# Patient Record
Sex: FEMALE | Race: WHITE | NOT HISPANIC OR LATINO | Employment: FULL TIME | URBAN - METROPOLITAN AREA
[De-identification: names, ages, dates, MRNs, and addresses within clinical notes are randomized per-mention and may not be internally consistent; named-entity substitution may affect disease eponyms.]

---

## 2017-01-27 ENCOUNTER — TELEPHONE (OUTPATIENT)
Dept: NEUROLOGY | Facility: CLINIC | Age: 52
End: 2017-01-27

## 2017-01-27 NOTE — TELEPHONE ENCOUNTER
----- Message from Vicky Leung sent at 1/27/2017  1:26 PM CST -----  Contact: Georgina Perrin   Call for scheduling   Call back

## 2017-02-03 ENCOUNTER — OFFICE VISIT (OUTPATIENT)
Dept: NEUROLOGY | Facility: CLINIC | Age: 52
End: 2017-02-03
Payer: COMMERCIAL

## 2017-02-03 VITALS
DIASTOLIC BLOOD PRESSURE: 95 MMHG | TEMPERATURE: 97 F | HEART RATE: 94 BPM | BODY MASS INDEX: 33.38 KG/M2 | SYSTOLIC BLOOD PRESSURE: 135 MMHG | WEIGHT: 170 LBS | RESPIRATION RATE: 18 BRPM | HEIGHT: 60 IN

## 2017-02-03 DIAGNOSIS — G43.419 INTRACTABLE HEMIPLEGIC MIGRAINE WITHOUT STATUS MIGRAINOSUS: Primary | ICD-10-CM

## 2017-02-03 PROCEDURE — 99204 OFFICE O/P NEW MOD 45 MIN: CPT | Mod: S$GLB,,, | Performed by: PSYCHIATRY & NEUROLOGY

## 2017-02-03 PROCEDURE — 99999 PR PBB SHADOW E&M-EST. PATIENT-LVL III: CPT | Mod: PBBFAC,,, | Performed by: PSYCHIATRY & NEUROLOGY

## 2017-02-03 RX ORDER — POTASSIUM CHLORIDE 750 MG/1
20 CAPSULE, EXTENDED RELEASE ORAL DAILY
COMMUNITY
End: 2020-05-09 | Stop reason: CLARIF

## 2017-02-03 RX ORDER — LEVOTHYROXINE SODIUM 50 UG/1
100 TABLET ORAL
COMMUNITY
Start: 2017-01-18 | End: 2020-05-09 | Stop reason: CLARIF

## 2017-02-03 RX ORDER — METHYLPREDNISOLONE 4 MG/1
TABLET ORAL
COMMUNITY
Start: 2016-12-12 | End: 2017-02-03

## 2017-02-03 RX ORDER — LOSARTAN POTASSIUM 25 MG/1
TABLET ORAL
COMMUNITY
Start: 2016-12-29 | End: 2020-05-09 | Stop reason: CLARIF

## 2017-02-03 RX ORDER — HYDROCHLOROTHIAZIDE 12.5 MG/1
12.5 CAPSULE ORAL DAILY
COMMUNITY
Start: 2017-01-09 | End: 2021-02-22 | Stop reason: SDUPTHER

## 2017-02-03 NOTE — PROGRESS NOTES
"Subjective:       Patient ID: Nena Franco is a 52 y.o. female.    Chief Complaint: Headache    HPI    Ms. Franco is a 53 yo RHWF with pmh significant for 2006 benign pituitary adenoma, unchanged per repeat MRI 2010, s/p gabapentin therapy for 2 years. She also suffers with coronary vasospasm, SVT (resolved 2009) who is here with a diagnosis of left complex hemiplegic migraines diagnosed in 2012 by [facility] neurologist Dr. Martínez Peterson. Her headaches started in 1990s with typical migraine aurass. She was well without episodes from 8368-7220 then restarted in 2010. Prodrome consists of chills and left had loss of circulation "dusky" colored. Getting the prodrome less frequently now. Gets HAs 2-3 times/week + unsteady + dizziness + left sided hemiplegia + decreased concentration + blurry vision + dysarthria. Her pain is not a headache per se, it affects the left sided joints. It responds to a combination of flexeril and naproxen.     Trialed: Botox (gets eyebrow paralysis if below hairline) controls best; Has tried Topamax, Depakote, Tofranil, Atenolol, Metropolol, Nadolol, Propanolol (former did not work), Imitrex (triptan syndrome); Flexeril + Naproxen shortens duration of symptomatology; acupuncture did not help        Headache questionnaire     1. When did your Headaches start?   2010        2. Where are your headaches located?  Mostly back of neck to temple        3. Your headache's characteristics:  Pressure, Stabbing        4. How long does the headache last?  12 hours to 2 days        5. How often does the headache occur?  2-3 times a week        6. Are your headaches preceded or accompanied by other symptoms? yes  If yes, please describe. Left hand turns black; stroke like symptoms        7. Does the headache awaken you at night? no  If so, how often?            8. Please kenna the word that best describes your headache's intensity:   Moderate to severe        9. Using a scale of 1 through 10, " with 0 = no pain and 10 = the worst pain:  What score is your headache now? 3  What score is your headache at its worst? 10  What score is your headache at its best? 1      10. Possible associated headache symptoms:  []  Sensitivity to light  [x] Dizziness  [] Nasal or sinus pressure/ pain   [] Sensitivity to noise  [x] Vertigo  [x] Problems with concentration  [] Sensitivity to smells  [] Ringing in ears  [x] Problems with memory    [x] Blurred vision  [] Irritability  [x] Problems with task completion   [] Double vision  [] Anger  []  Problems with relaxation  [] Loss of appetite  [] Anxiety  [] Neck tightness, Neck pain  [] Nausea   [] Nasal congestion  [] Vomiting           11. Headache improving factors:  [] Sleep  [] Heat  [] Darkness  [] Ice  [] Local pressure [] Menses (period)  [] Massage   [x] Medications:         12. Headache worsening factors:   [] Fatigue [] Sneezing  [] Changes in Weather  [] Light [] Bending Over [] Stress  [] Noise [] Ovulation  [] Multiple Sclerosis Flare-Up  [] Smells  [] Menses  [] Food   [] Coughing [] Alcohol        13. Number of caffeinated drinks per day: 1        14. Number of diet drinks per day: 0     Review of Systems   Constitutional: Negative for activity change, appetite change, fatigue and fever.   HENT: Negative for congestion, dental problem, hearing loss, sinus pressure, tinnitus, trouble swallowing and voice change.    Eyes: Negative for photophobia, pain, redness and visual disturbance.   Respiratory: Negative for cough, chest tightness and shortness of breath.    Cardiovascular: Negative for chest pain, palpitations and leg swelling.   Gastrointestinal: Negative for abdominal pain, blood in stool, nausea and vomiting.   Endocrine: Negative for cold intolerance and heat intolerance.   Genitourinary: Negative for difficulty urinating, frequency, menstrual problem and urgency.   Musculoskeletal: Negative for arthralgias, back pain, gait problem, joint swelling,  myalgias, neck pain and neck stiffness.   Skin: Negative.    Neurological: Positive for headaches. Negative for dizziness, tremors, seizures, syncope, facial asymmetry, speech difficulty, weakness, light-headedness and numbness.   Hematological: Negative for adenopathy. Does not bruise/bleed easily.   Psychiatric/Behavioral: Negative for agitation, behavioral problems, confusion, decreased concentration, self-injury, sleep disturbance and suicidal ideas. The patient is not nervous/anxious and is not hyperactive.          Past Medical History   Diagnosis Date    Adenoma of pituitary      benign, history of, no longer visible on CT scan after treatment    GERD (gastroesophageal reflux disease)     Hiatal hernia     History of kidney stones     Migraines     Sustained SVT      history of, no problem at this time     Past Surgical History   Procedure Laterality Date    Hysterectomy      Left foot       orthopedic repair    Pr exploratory of abdomen       for ruptured ectopic    Appendectomy      Cholecystectomy       lap baldev with scar revision    Cardiac catheterization      Incision and drainage of wound       spider bite to chest wall    Ganglion cyst excision       right wrist    Esophagogastroduodenoscopy      Colonoscopy w/ biopsies and polypectomy       Family History   Problem Relation Age of Onset    Cancer Mother      colon ca    Cancer Father      colon ca     Social History     Social History    Marital status: Single     Spouse name: N/A    Number of children: N/A    Years of education: N/A     Occupational History    Not on file.     Social History Main Topics    Smoking status: Never Smoker    Smokeless tobacco: Never Used    Alcohol use Yes      Comment: rarely    Drug use: No    Sexual activity: Not on file     Other Topics Concern    Not on file     Social History Narrative     Review of patient's allergies indicates:   Allergen Reactions    Adhesive      Sensitive to  Steri-strips     Guaifenesin     Levaquin [levofloxacin]     Guaifenesin-sodium citrate Rash       Current Outpatient Prescriptions:     cyclobenzaprine (FLEXERIL) 10 MG tablet, Take 10 mg by mouth 3 (three) times daily as needed., Disp: , Rfl:     esomeprazole (NEXIUM) 40 MG capsule, Take 40 mg by mouth before breakfast., Disp: , Rfl:     hydrochlorothiazide (MICROZIDE) 12.5 mg capsule, , Disp: , Rfl:     losartan (COZAAR) 25 MG tablet, , Disp: , Rfl:     naproxen (EC NAPROSYN) 500 MG EC tablet, Take 500 mg by mouth daily as needed., Disp: , Rfl:     potassium chloride (MICRO-K) 10 MEQ CpSR, Take 20 mEq by mouth once daily., Disp: , Rfl:     SYNTHROID 50 mcg tablet, , Disp: , Rfl:       Objective:      Vitals:    02/03/17 0909   BP: (!) 135/95   Pulse: 94   Resp: 18   Temp: 97 °F (36.1 °C)     Body mass index is 33.2 kg/(m^2).    Physical Exam    Constitutional:   She appears well-developed and well-nourished. She is well groomed    HENT:    Head: Atraumatic, oral and nasal mucosa intact  Eyes: Conjunctivae and EOM are normal. Pupils are equal, round, and reactive to light OU  Neck: Neck supple. No thyromegaly present  Cardiovascular: Normal rate and normal heart sounds  No murmur heard  Pulmonary/Chest: Effort normal and breath sounds normal  Musculoskeletal: Normal range of motion. No joint stiffness. No vertebral point tenderness  Skin: Skin is warm and dry  Psychiatric: Normal mood and affect     Neuro exam:    Mental status:  Awake, attentive, Alert, oriented to self, place, year and month  Language function is intact    Cranial Nerves:  Smell was not formally evaluated  Cranial Nerves II - XII: intact  Pursuits were smooth, normal saccades, no nystagmus OU  Funduscopic exam - disc were flat and pink, no exudates or hemorrhages OU  Motor - facial movement was symmetrical and normal     Palate moved well and was symmetrical with normal palatal and oral sensation  Tongue movements were  full    Coordination:     Rapid alternating movements and rapid finger tapping - normal bilaterally  Finger to nose - normal and symmetric bilaterally   Heel to shin test - normal and symmetric bilaterally   Arm roll - smooth and symmetric   No intentional or positional tremor.     Motor:  Normal muscle bulk and symmetry. No fasciculations were noted    No pronator drift  Strength  5/5 bilaterally     Reflexes:  Tendon reflexes were 2 + at biceps, triceps, brachioradialis, patellar, and Achilles bilaterally  On plantar stimulation toes were down going bilaterally  No clonus was noted     Sensory: Intact to light touch, pin prick in all extremities, except minimal decrease in dorsum of left hand and right forehead (10% decrease)    Gait: Romberg absent. Normal gait. Normal arm swing and turns. Normal postural reflexes.     Review of Data: MRI of the brain, unremarkable        Assessment and Plan   Hemiplegic Migraine, has done best with Botox Treatment. Previously tried Topamax, Depakote, Tofranil, Atenolol, Metropolol, Nadolol, Propanolol (former did not work), Imitrex (triptan syndrome); Flexeril + Naproxen shortens duration of symptomatology; acupuncture did not help   For added prevention start Verapamil 120 ER daily and Magnesium Oxide 400 mg bid  For the treatment of the aura Diamox 250 mg and for the pain phase continue with combination of Naproxen and flexeril   I have discussed the side effects of the medications prescribed and the patient acknowledges understanding  RTC in 2 to 3 weeks for Botox  Eulalia Paniagua M.D  Medical Director, Headache and Facial Pain  [facility]

## 2017-02-03 NOTE — PATIENT INSTRUCTIONS
Botox Treatment  For added prevention start Verapamil 120 ER daily and Magnesium Oxide 400 mg bid  For the treatment of the aura Diamox 250 mg and for the pain phase continue with combination of Naproxen and flexeril

## 2017-02-03 NOTE — PROGRESS NOTES
Headache questionnaire    1. When did your Headaches start?    2010      2. Where are your headaches located?   Mostly back of neck to temple      3. Your headache's characteristics:   Pressure, Stabbing      4. How long does the headache last?   12 hours to 2 days      5. How often does the headache occur?   2-3 times a week      6. Are your headaches preceded or accompanied by other symptoms? yes   If yes, please describe.  Left hand turns black; stroke like symptoms      7. Does the headache awaken you at night? no   If so, how often?         8. Please kenna the word that best describes your headache's intensity:    Moderate to severe      9. Using a scale of 1 through 10, with 0 = no pain and 10 = the worst pain:   What score is your headache now? 3   What score is your headache at its worst? 10   What score is your headache at its best? 1        10. Possible associated headache symptoms:  []  Sensitivity to light  [x] Dizziness  [] Nasal or sinus pressure/ pain   [] Sensitivity to noise  [x] Vertigo  [x] Problems with concentration  [] Sensitivity to smells  [] Ringing in ears  [x] Problems with memory    [x] Blurred vision  [] Irritability  [x] Problems with task completion   [] Double vision  [] Anger  []  Problems with relaxation  [] Loss of appetite  [] Anxiety  [] Neck tightness, Neck pain  [] Nausea   [] Nasal congestion  [] Vomiting         11. Headache improving factors:  [] Sleep  [] Heat  [] Darkness  [] Ice  [] Local pressure [] Menses (period)  [] Massage   [x] Medications:        12. Headache worsening factors:   [] Fatigue [] Sneezing  [] Changes in Weather  [] Light [] Bending Over [] Stress  [] Noise [] Ovulation  [] Multiple Sclerosis Flare-Up  [] Smells  [] Menses  [] Food   [] Coughing [] Alcohol      13. Number of caffeinated drinks per day: 1      14. Number of diet drinks per day:  0      15. Have you seen any other [facility]ists within the last 3 years?  no

## 2017-02-03 NOTE — MR AVS SNAPSHOT
[facility]  [address] [facility] [address]  [place] [address]  Phone: [phone]  Fax: [phone]                  Nena Franco   2/3/2017 9:00 AM   Office Visit    Description:  Female : 1965   Provider:  Destinee Paniagua MD   Department:  [facility]           Reason for Visit     Headache           Diagnoses this Visit        Comments    Intractable hemiplegic migraine without status migrainosus    -  Primary            To Do List           Future Appointments        Provider Department Dept Phone    2/15/2017 8:30 AM Destinee Paniagua MD [facility] [phone]      Goals (5 Years of Data)     None      [facility]     [facility] On Call Nurse Care Line -  Assistance  Registered nurses in the [facility] On Call Center provide clinical advisement, health education, appointment booking, and other advisory services.  Call for this free service at [phone].             Medications           Message regarding Medications     Verify the changes and/or additions to your medication regime listed below are the same as discussed with your clinician today.  If any of these changes or additions are incorrect, please notify your healthcare provider.        STOP taking these medications     topiramate (TOPAMAX) 25 MG tablet Take 25 mg by mouth 2 (two) times daily.    methylPREDNISolone (MEDROL DOSEPACK) 4 mg tablet            Verify that the below list of medications is an accurate representation of the medications you are currently taking.  If none reported, the list may be blank. If incorrect, please contact your healthcare provider. Carry this list with you in case of emergency.           Current Medications     cyclobenzaprine (FLEXERIL) 10 MG tablet Take 10 mg by mouth 3 (three) times daily as needed.    esomeprazole (NEXIUM) 40 MG capsule Take 40 mg by mouth before breakfast.    hydrochlorothiazide (MICROZIDE) 12.5 mg capsule     losartan (COZAAR) 25 MG  tablet     naproxen (EC NAPROSYN) 500 MG EC tablet Take 500 mg by mouth daily as needed.    potassium chloride (MICRO-K) 10 MEQ CpSR Take 20 mEq by mouth once daily.    SYNTHROID 50 mcg tablet            Clinical Reference Information           Your Vitals Were     BP Pulse Temp Resp Height Weight    135/95 (BP Location: Left arm, Patient Position: Sitting, BP Method: Automatic) 94 97 °F (36.1 °C) (Oral) 18 5' (1.524 m) 77.1 kg (170 lb)    BMI                33.2 kg/m2          Blood Pressure          Most Recent Value    BP  (!)  135/95      Allergies as of 2/3/2017     Adhesive    Guaifenesin    Levaquin [Levofloxacin]    Guaifenesin-sodium Citrate      Immunizations Administered on Date of Encounter - 2/3/2017     None      Instructions    Botox Treatment  For added prevention start Verapamil 120 ER daily and Magnesium Oxide 400 mg bid  For the treatment of the aura Diamox 250 mg and for the pain phase continue with combination of Naproxen and flexeril        Language Assistance Services     ATTENTION: Language assistance services are available, free of charge. Please call [phone].      ATENCIÓN: Si dyllanla camille, tiene a arevalo disposición servicios gratuitos de asistencia lingüística. Llame al [phone].     TARAS Ý: N?u b?n nói Ti?ng Vi?t, có các d?ch v? h? tr? ngôn ng? mi?n phí dành cho b?n. G?i s? [phone].         [facility] complies with applicable Federal civil rights laws and does not discriminate on the basis of race, color, national origin, age, disability, or sex.

## 2017-02-03 NOTE — MEDICAL/APP STUDENT
"S: Ms. Franco is 51 yo RHWF with pmh significant for 2006 benign pituitary adenoma s/p gabapentin therapy for 2 years, coronary vasospasm, SVT (resolved 2009) here with a diagnosis of  left complex hemiplegic migraines diagnosed in 2012 by [place] Dr. Peterson. Headaches started in 1990s with classic migraines nothing in 3335-4250 then restarted in 2010. Prodrome is chills and left had loss of circulation "dusky" colored. Getting the prodrome less frequently now. Gets HAs 2-3 times/week + unsteady + dizziness + left sided hemiplegia + decreased concentration + blurry vision + dysarthria.    Triggers: Cold    Trialed: Botox (gets eyebrow paralysis if below hairline) controls best; Topamax, Depakote, Tofranil, Atenolol, Metropolol, Nadolol, Propanolol (former did not work), Imitrex (triptan syndrome); Flexeril + Naproxen shortens duration of symptomatology; acupuncture did not help     Social: Patient works as a nurse consultant for [employer], drinks alcohol 2-3 times/ year,  Non-smoker  FHx: Mother with migraines    Current Outpatient Prescriptions on File Prior to Visit   Medication Sig Dispense Refill    cyclobenzaprine (FLEXERIL) 10 MG tablet Take 10 mg by mouth 3 (three) times daily as needed.      esomeprazole (NEXIUM) 40 MG capsule Take 40 mg by mouth before breakfast.      naproxen (EC NAPROSYN) 500 MG EC tablet Take 500 mg by mouth daily as needed.      [DISCONTINUED] topiramate (TOPAMAX) 25 MG tablet Take 25 mg by mouth 2 (two) times daily.       No current facility-administered medications on file prior to visit.      O:  Visit Vitals    BP (!) 135/95 (BP Location: Left arm, Patient Position: Sitting, BP Method: Automatic)    Pulse 94    Temp 97 °F (36.1 °C) (Oral)    Resp 18    Ht 5' (1.524 m)    Wt 77.1 kg (170 lb)    BMI 33.2 kg/m2     MSE: AOx3  CN: R trapezius increased bulk  Tone: normal  Motor: 5/5  DTRs: 1+  SN: R face and L arm decreased to sharp touch 90-95/100  Coordination: normal " finger to nose  Gait: normal  Romberg: negative    A: 53 yo RHWF here with diagnosed left sided complex hemiplegic migraines  P:  1. Magnesium and Riboflavin   2. botox approval

## 2017-02-03 NOTE — LETTER
February 3, 2017      Martínez Peterson MD  [address]  [address]  [facility] [address]           [facility]  [address] [facility] [address] [place] [address]  Phone: [phone]  Fax: [phone]          Patient: Nena Franco   MR Number: [id number]   YOB: 1965   Date of Visit: 2/3/2017       Dear Dr. Martínez Peterson:    Thank you for referring Nena Franco to me for evaluation. Attached you will find relevant portions of my assessment and plan of care.    If you have questions, please do not hesitate to call me. I look forward to following Nena Franco along with you.    Sincerely,    Destinee Paniagua MD    Enclosure  CC:  No Recipients    If you would like to receive this communication electronically, please contact [email or web address] or [phone] to request more information on [employer] access.    For providers and/or their staff who would like to refer a patient to [facility], please contact us through our one-stop-shop provider referral line, [facility], at [phone].    If you feel you have received this communication in error or would no longer like to receive these types of communications, please e-mail [email or web address]

## 2017-02-06 ENCOUNTER — TELEPHONE (OUTPATIENT)
Dept: NEUROLOGY | Facility: CLINIC | Age: 52
End: 2017-02-06

## 2017-02-06 NOTE — TELEPHONE ENCOUNTER
----- Message from Sharon Walters sent at 2/3/2017  5:58 PM CST -----  Contact: Pt  Pt is calling to have medications for migraines called in.    Rx can be sent to [facility] at [phone]  Pt can be reached at [phone].    Thank you

## 2017-02-06 NOTE — TELEPHONE ENCOUNTER
"Patient states that after her visit Friday, 2/3, she went to the pharmacy and none of her medications had been called in. Message sent to Dr. Paniagua.       Note from chart with anticipated medications:   "For added prevention start Verapamil 120 ER daily and Magnesium Oxide 400 mg bid.  For the treatment of the aura Diamox 250 mg and for the pain phase continue with combination of Naproxen and flexeril."  "

## 2017-02-07 RX ORDER — VERAPAMIL HYDROCHLORIDE 120 MG/1
120 TABLET, FILM COATED, EXTENDED RELEASE ORAL NIGHTLY
Qty: 30 TABLET | Refills: 11 | Status: SHIPPED | OUTPATIENT
Start: 2017-02-07 | End: 2017-11-09 | Stop reason: ALTCHOICE

## 2017-02-07 RX ORDER — ACETAZOLAMIDE 250 MG/1
TABLET ORAL
Qty: 20 TABLET | Refills: 11 | Status: SHIPPED | OUTPATIENT
Start: 2017-02-07 | End: 2017-11-09 | Stop reason: ALTCHOICE

## 2017-02-07 RX ORDER — LANOLIN ALCOHOL/MO/W.PET/CERES
400 CREAM (GRAM) TOPICAL DAILY
Qty: 60 TABLET | Refills: 11 | COMMUNITY
Start: 2017-02-07 | End: 2020-05-09 | Stop reason: CLARIF

## 2017-02-07 NOTE — TELEPHONE ENCOUNTER
----- Message from Maddi Samson sent at 2/6/2017  5:05 PM CST -----  Contact: self   Pt is calling in to get in touch with the staff regarding starting the medication.     Pt was told that the medication that she was to get at the pharmacy is not there.     Please contact pt for more information needed at [phone].    Thanks

## 2017-02-08 NOTE — TELEPHONE ENCOUNTER
Called patient to make sure she received her medication as we have confirmed with the pharmacy that the medication orders were received. Left a message to call.

## 2017-02-13 ENCOUNTER — TELEPHONE (OUTPATIENT)
Dept: NEUROLOGY | Facility: CLINIC | Age: 52
End: 2017-02-13

## 2017-02-13 NOTE — TELEPHONE ENCOUNTER
Received a call from Rosetta Garcia in [facility]. She stated that the patient's insurance called her back this morning and told her that they can not authorize the botox due to the patient still having authorization for [facility]. Rosetta gave me the number to give to the patient to call them and inform the insurance company that she is not having the botox done there.     Spoke with patient and informed her what Rosetta told me. Gave her the number to call the insurance company at [phone]. Asked the patient to call me back once she speaks with Radha so I can let Rosetta know as well. Pt verbalized an understanding and said that she has not been to [employer] since 2013 but she is going to call them now.

## 2017-02-13 NOTE — TELEPHONE ENCOUNTER
Spoke with patient. She stated that she spoke with the insurance company and they changed the location in the computer for her and now our pre service department can call her insurance back and they will give her the updated information.

## 2017-02-15 ENCOUNTER — PROCEDURE VISIT (OUTPATIENT)
Dept: NEUROLOGY | Facility: CLINIC | Age: 52
End: 2017-02-15
Payer: COMMERCIAL

## 2017-02-15 VITALS
WEIGHT: 169.75 LBS | SYSTOLIC BLOOD PRESSURE: 130 MMHG | DIASTOLIC BLOOD PRESSURE: 78 MMHG | RESPIRATION RATE: 18 BRPM | BODY MASS INDEX: 33.33 KG/M2 | HEART RATE: 68 BPM | HEIGHT: 60 IN | TEMPERATURE: 97 F

## 2017-02-15 DIAGNOSIS — G43.719 CHRONIC MIGRAINE WITHOUT AURA, WITH INTRACTABLE MIGRAINE, SO STATED, WITHOUT MENTION OF STATUS MIGRAINOSUS: Primary | ICD-10-CM

## 2017-02-15 PROCEDURE — 64615 CHEMODENERV MUSC MIGRAINE: CPT | Mod: S$GLB,,, | Performed by: PSYCHIATRY & NEUROLOGY

## 2017-02-15 RX ORDER — METHYLPREDNISOLONE 4 MG/1
TABLET ORAL
COMMUNITY
Start: 2017-02-09 | End: 2017-11-09 | Stop reason: ALTCHOICE

## 2017-02-15 NOTE — PROCEDURES
Procedures     BOTOX PROCEDURE    PROCEDURE PERFORMED: Botulinum toxin injection (72800)    CLINICAL INDICATION: G43.719   NDC: 0270-5707-25    A time out was conducted just before the start of the procedure to verify the correct patient and procedure, procedure location, and all relevant critical information.       This is the first Botox injections and I am aiming for at least 50%  improvement in the patient's symptoms. Frequency of treatment is every 3 months unless no response to the treatments, at which time we will discontinue the injections.     DESCRIPTION OF PROCEDURE: After obtaining informed consent and under aseptic technique, a total of 155 units of botulinum toxin type A were injected in the following muscles: Procerus 5 units,  5 units bilaterally, frontalis 20 units, temporalis 20 units bilaterally, occipitalis 15 units, upper cervical paraspinals 10 units bilaterally and trapezius 15 units bilaterally. The patient was given a total of 155 units in 31 sites.The patient tolerated the procedure well. There were no complications. The patient was given a prescription for repeat treatment in 3 months.     Unavoidable waste 45 units    Eulalia Paniagua M.D  Medical Director, Headache and Facial Pain  [facility]

## 2017-02-15 NOTE — MR AVS SNAPSHOT
[facility]  [address]  [facility] [address]  Phone: [phone]  Fax: [phone]                  Nena Franco   2/15/2017 8:30 AM   Procedure visit    Description:  Female : 1965   Provider:  Destinee Paniagua MD   Department:  [place] - Neurology           Reason for Visit     Botulinum Toxin Injection                To Do List           Future Appointments        Provider Department Dept Phone    2017 9:00 AM Destinee Paniagua MD [facility] [phone]      Goals (5 Years of Data)     None      [facility]     [facility] Nurse Care [facility] -  Assistance  Registered nurses in the [facility] On Call Center provide clinical advisement, health education, appointment booking, and other advisory services.  Call for this free service at [phone].             Medications           Message regarding Medications     Verify the changes and/or additions to your medication regime listed below are the same as discussed with your clinician today.  If any of these changes or additions are incorrect, please notify your healthcare provider.             Verify that the below list of medications is an accurate representation of the medications you are currently taking.  If none reported, the list may be blank. If incorrect, please contact your healthcare provider. Carry this list with you in case of emergency.           Current Medications     acetaZOLAMIDE (DIAMOX) 250 MG tablet Take at onset of aura    cyclobenzaprine (FLEXERIL) 10 MG tablet Take 10 mg by mouth 3 (three) times daily as needed.    esomeprazole (NEXIUM) 40 MG capsule Take 40 mg by mouth before breakfast.    hydrochlorothiazide (MICROZIDE) 12.5 mg capsule     losartan (COZAAR) 25 MG tablet     magnesium oxide (MAG-OX) 400 mg tablet Take 1 tablet (400 mg total) by mouth once daily.    methylPREDNISolone (MEDROL DOSEPACK) 4 mg tablet     naproxen (EC NAPROSYN) 500 MG EC tablet Take 500  mg by mouth daily as needed.    potassium chloride (MICRO-K) 10 MEQ CpSR Take 20 mEq by mouth once daily.    SYNTHROID 50 mcg tablet     verapamil (CALAN-SR) 120 MG CR tablet Take 1 tablet (120 mg total) by mouth every evening.           Clinical Reference Information           Allergies as of 2/15/2017     Adhesive    Guaifenesin    Levaquin [Levofloxacin]    Guaifenesin-sodium Citrate      Immunizations Administered on Date of Encounter - 2/15/2017     None      Language Assistance Services     ATTENTION: Language assistance services are available, free of charge. Please call [phone].      ATENCIÓN: Si habla español, tiene a arevalo disposición servicios gratuitos de asistencia lingüística. Llame al [phone].     TARAS Ý: N?u b?n nói Ti?ng Vi?t, có các d?ch v? h? tr? ngôn ng? mi?n phí dành cho b?n. G?i s? [phone].         [facility] complies with applicable Federal civil rights laws and does not discriminate on the basis of race, color, national origin, age, disability, or sex.

## 2017-03-06 ENCOUNTER — TELEPHONE (OUTPATIENT)
Dept: NEUROLOGY | Facility: CLINIC | Age: 52
End: 2017-03-06

## 2017-03-06 NOTE — TELEPHONE ENCOUNTER
Spoke with patient. She said that you gave her a treatment plan for the Hemiplegic Migraines but she is having just a regular Classic Migraine at the moment. She is wanting to know if she should do the same treatment plan as if she had the Hemiplegic Migraine or do something different.

## 2017-03-06 NOTE — TELEPHONE ENCOUNTER
----- Message from Farzaneh Mckinney sent at 3/6/2017 12:41 PM CST -----  Contact: self: [phone]  Pt called and would like to speak to nurse in reference to her migraine.    Pt can be reached at [phone]      Thank you

## 2017-05-12 ENCOUNTER — PROCEDURE VISIT (OUTPATIENT)
Dept: NEUROLOGY | Facility: CLINIC | Age: 52
End: 2017-05-12
Payer: COMMERCIAL

## 2017-05-12 VITALS
HEART RATE: 66 BPM | DIASTOLIC BLOOD PRESSURE: 89 MMHG | TEMPERATURE: 97 F | RESPIRATION RATE: 18 BRPM | BODY MASS INDEX: 33.33 KG/M2 | SYSTOLIC BLOOD PRESSURE: 120 MMHG | HEIGHT: 60 IN | WEIGHT: 169.75 LBS

## 2017-05-12 DIAGNOSIS — G43.719 CHRONIC MIGRAINE WITHOUT AURA, WITH INTRACTABLE MIGRAINE, SO STATED, WITHOUT MENTION OF STATUS MIGRAINOSUS: Primary | ICD-10-CM

## 2017-05-12 PROCEDURE — 64615 CHEMODENERV MUSC MIGRAINE: CPT | Mod: S$GLB,,, | Performed by: PSYCHIATRY & NEUROLOGY

## 2017-05-12 NOTE — PROCEDURES
Procedures       BOTOX PROCEDURE    PROCEDURE PERFORMED: Botulinum toxin injection (83035)    CLINICAL INDICATION: G43.719   NDC: 5206-3317-79    A time out was conducted just before the start of the procedure to verify the correct patient and procedure, procedure location, and all relevant critical information.       This is the first Botox injections and I have achieved at least 80%  improvement in the patient's symptoms. Frequency of treatment is every 3 months unless no response to the treatments, at which time we will discontinue the injections.     DESCRIPTION OF PROCEDURE: After obtaining informed consent and under aseptic technique, a total of 155 units of botulinum toxin type A were injected in the following muscles: Procerus 5 units,  5 units bilaterally, frontalis 20 units, temporalis 20 units bilaterally, occipitalis 15 units, upper cervical paraspinals 10 units bilaterally and trapezius 15 units bilaterally. The patient was given a total of 155 units in 31 sites.The patient tolerated the procedure well. There were no complications. The patient was given a prescription for repeat treatment in 3 months.     Unavoidable waste 45 units    Eulalia Paniagua M.D  Medical Director, Headache and Facial Pain  [facility]

## 2017-05-12 NOTE — MR AVS SNAPSHOT
[facility]  [address]  [facility] [address]  Phone: [phone]  Fax: [phone]                  Nena Franco   2017 9:00 AM   Procedure visit    Description:  Female : 1965   Provider:  Destinee Paniagua MD   Department:  [place] - Neurology           Reason for Visit     Botulinum Toxin Injection                To Do List           Future Appointments        Provider Department Dept Phone    2017 9:00 AM Destinee Paniagua MD [facility] [phone]    2017 8:45 AM Destinee Paniagua MD [facility] [phone]      Goals (5 Years of Data)     None      [facility]     [facility] On Call Nurse Care Line -  Assistance  Unless otherwise directed by your provider, please contact [facility], our nurse care line that is available for  assistance.     Registered nurses in the [facility] Center provide: appointment scheduling, clinical advisement, health education, and other advisory services.  Call: [phone] (toll free)               Medications           Message regarding Medications     Verify the changes and/or additions to your medication regime listed below are the same as discussed with your clinician today.  If any of these changes or additions are incorrect, please notify your healthcare provider.             Verify that the below list of medications is an accurate representation of the medications you are currently taking.  If none reported, the list may be blank. If incorrect, please contact your healthcare provider. Carry this list with you in case of emergency.           Current Medications     acetaZOLAMIDE (DIAMOX) 250 MG tablet Take at onset of aura    cyclobenzaprine (FLEXERIL) 10 MG tablet Take 10 mg by mouth 3 (three) times daily as needed.    esomeprazole (NEXIUM) 40 MG capsule Take 40 mg by mouth before breakfast.    hydrochlorothiazide (MICROZIDE) 12.5 mg capsule     losartan (COZAAR)  25 MG tablet     magnesium oxide (MAG-OX) 400 mg tablet Take 1 tablet (400 mg total) by mouth once daily.    methylPREDNISolone (MEDROL DOSEPACK) 4 mg tablet     naproxen (EC NAPROSYN) 500 MG EC tablet Take 500 mg by mouth daily as needed.    potassium chloride (MICRO-K) 10 MEQ CpSR Take 20 mEq by mouth once daily.    SYNTHROID 50 mcg tablet     verapamil (CALAN-SR) 120 MG CR tablet Take 1 tablet (120 mg total) by mouth every evening.           Clinical Reference Information           Allergies as of 5/12/2017     Adhesive    Guaifenesin    Levaquin [Levofloxacin]    Guaifenesin-sodium Citrate      Immunizations Administered on Date of Encounter - 5/12/2017     None      Language Assistance Services     ATTENTION: Language assistance services are available, free of charge. Please call [phone].      ATENCIÓN: Si kevyn camille, tiene a arevalo disposición servicios gratuitos de asistencia lingüística. Llame al [phone].     CHÚ Ý: N?u b?n nói Ti?ng Vi?t, có các d?ch v? h? tr? ngôn ng? mi?n phí dành cho b?n. G?i s? [phone].         [facility] complies with applicable Federal civil rights laws and does not discriminate on the basis of race, color, national origin, age, disability, or sex.

## 2017-08-18 ENCOUNTER — PROCEDURE VISIT (OUTPATIENT)
Dept: NEUROLOGY | Facility: CLINIC | Age: 52
End: 2017-08-18
Payer: COMMERCIAL

## 2017-08-18 DIAGNOSIS — G43.719 CHRONIC MIGRAINE WITHOUT AURA, WITH INTRACTABLE MIGRAINE, SO STATED, WITHOUT MENTION OF STATUS MIGRAINOSUS: Primary | ICD-10-CM

## 2017-08-18 PROCEDURE — 64615 CHEMODENERV MUSC MIGRAINE: CPT | Mod: S$GLB,,, | Performed by: PSYCHIATRY & NEUROLOGY

## 2017-08-18 NOTE — PROCEDURES
Procedures       BOTOX PROCEDURE    PROCEDURE PERFORMED: Botulinum toxin injection (27271)    CLINICAL INDICATION: G43.719   NDC: 0882-6355-83    A time out was conducted just before the start of the procedure to verify the correct patient and procedure, procedure location, and all relevant critical information.       This is the third and last Botox injections and I have achieved 100%  improvement in the patient's symptoms. Frequency of treatment is every 3 months unless no response to the treatments, at which time we will discontinue the injections.     DESCRIPTION OF PROCEDURE: After obtaining informed consent and under aseptic technique, a total of 155 units of botulinum toxin type A were injected in the following muscles: Procerus 5 units,  5 units bilaterally, frontalis 20 units, temporalis 20 units bilaterally, occipitalis 15 units, upper cervical paraspinals 10 units bilaterally and trapezius 15 units bilaterally. The patient was given a total of 155 units in 31 sites.The patient tolerated the procedure well. There were no complications. The patient was given a prescription for repeat treatment in 3 months.     Unavoidable waste 45 units    Eulalia Paniagua M.D  Medical Director, Headache and Facial Pain  [facility]

## 2017-11-09 ENCOUNTER — OFFICE VISIT (OUTPATIENT)
Dept: NEUROLOGY | Facility: CLINIC | Age: 52
End: 2017-11-09
Payer: COMMERCIAL

## 2017-11-09 VITALS
HEIGHT: 60 IN | DIASTOLIC BLOOD PRESSURE: 68 MMHG | RESPIRATION RATE: 18 BRPM | SYSTOLIC BLOOD PRESSURE: 99 MMHG | WEIGHT: 175.25 LBS | HEART RATE: 108 BPM | BODY MASS INDEX: 34.41 KG/M2

## 2017-11-09 DIAGNOSIS — G43.419 INTRACTABLE HEMIPLEGIC MIGRAINE WITHOUT STATUS MIGRAINOSUS: Primary | ICD-10-CM

## 2017-11-09 PROCEDURE — 99214 OFFICE O/P EST MOD 30 MIN: CPT | Mod: S$GLB,,, | Performed by: PSYCHIATRY & NEUROLOGY

## 2017-11-09 PROCEDURE — 99999 PR PBB SHADOW E&M-EST. PATIENT-LVL III: CPT | Mod: PBBFAC,,, | Performed by: PSYCHIATRY & NEUROLOGY

## 2017-11-09 RX ORDER — PROMETHAZINE HYDROCHLORIDE 25 MG/1
25 TABLET ORAL
COMMUNITY
Start: 2017-11-07 | End: 2020-05-09 | Stop reason: CLARIF

## 2017-11-09 RX ORDER — PHENTERMINE AND TOPIRAMATE 15; 92 MG/1; MG/1
CAPSULE, EXTENDED RELEASE ORAL
COMMUNITY
Start: 2017-10-28 | End: 2018-01-29

## 2017-11-09 NOTE — PROGRESS NOTES
"Subjective:       Patient ID: Nena Franco is a 52 y.o. female.    Chief Complaint: Headache  INTERVAL HISTORY    The patient is doing quite well. No headaches since the second Botox treatment. She is in the process of tapering most of her medications. For instance, since her blood pressure is low, she will be tapering antihypertensives, microdiuril and Losartan. She is also completing a diet pill, Qsymia. Her PCP, Dr. Ventura is guiding her general care  HPI    Ms. Franco is a 51 yo RHWF with pmh significant for 2006 benign pituitary adenoma, unchanged per repeat MRI 2010, s/p gabapentin therapy for 2 years. She also suffers with coronary vasospasm, SVT (resolved 2009) who is here with a diagnosis of left complex hemiplegic migraines diagnosed in 2012 by [place] neurologist Dr. Martínez Peterson. Her headaches started in 1990s with typical migraine aurass. She was well without episodes from 3329-0398 then restarted in 2010. Prodrome consists of chills and left had loss of circulation "dusky" colored. Getting the prodrome less frequently now. Gets HAs 2-3 times/week + unsteady + dizziness + left sided hemiplegia + decreased concentration + blurry vision + dysarthria. Her pain is not a headache per se, it affects the left sided joints. It responds to a combination of flexeril and naproxen.     Trialed: Botox (gets eyebrow paralysis if below hairline) controls best; Has tried Topamax, Depakote, Tofranil, Atenolol, Metropolol, Nadolol, Propanolol (former did not work), Imitrex (triptan syndrome); Flexeril + Naproxen shortens duration of symptomatology; acupuncture did not help        Headache questionnaire     1. When did your Headaches start?   2010        2. Where are your headaches located?  Mostly back of neck to temple        3. Your headache's characteristics:  Pressure, Stabbing        4. How long does the headache last?  12 hours to 2 days        5. How often does the headache occur?  2-3 times a " week        6. Are your headaches preceded or accompanied by other symptoms? yes  If yes, please describe. Left hand turns black; stroke like symptoms        7. Does the headache awaken you at night? no  If so, how often?            8. Please kenna the word that best describes your headache's intensity:   Moderate to severe        9. Using a scale of 1 through 10, with 0 = no pain and 10 = the worst pain:  What score is your headache now? 3  What score is your headache at its worst? 10  What score is your headache at its best? 1      10. Possible associated headache symptoms:  []  Sensitivity to light  [x] Dizziness  [] Nasal or sinus pressure/ pain   [] Sensitivity to noise  [x] Vertigo  [x] Problems with concentration  [] Sensitivity to smells  [] Ringing in ears  [x] Problems with memory    [x] Blurred vision  [] Irritability  [x] Problems with task completion   [] Double vision  [] Anger  []  Problems with relaxation  [] Loss of appetite  [] Anxiety  [] Neck tightness, Neck pain  [] Nausea   [] Nasal congestion  [] Vomiting           11. Headache improving factors:  [] Sleep  [] Heat  [] Darkness  [] Ice  [] Local pressure [] Menses (period)  [] Massage   [x] Medications:         12. Headache worsening factors:   [] Fatigue [] Sneezing  [] Changes in Weather  [] Light [] Bending Over [] Stress  [] Noise [] Ovulation  [] Multiple Sclerosis Flare-Up  [] Smells  [] Menses  [] Food   [] Coughing [] Alcohol        13. Number of caffeinated drinks per day: 1        14. Number of diet drinks per day: 0     Review of Systems   Constitutional: Negative for activity change, appetite change, fatigue and fever.   HENT: Negative for congestion, dental problem, hearing loss, sinus pressure, tinnitus, trouble swallowing and voice change.    Eyes: Negative for photophobia, pain, redness and visual disturbance.   Respiratory: Negative for cough, chest tightness and shortness of breath.    Cardiovascular: Negative for chest pain,  palpitations and leg swelling.   Endocrine: Negative for cold intolerance and heat intolerance.   Genitourinary: Negative for difficulty urinating, frequency, menstrual problem and urgency.   Musculoskeletal: Negative for arthralgias, back pain, gait problem, joint swelling, myalgias, neck pain and neck stiffness.   Skin: Negative.    Neurological: Positive for headaches. Negative for dizziness, tremors, seizures, syncope, facial asymmetry, speech difficulty, weakness, light-headedness and numbness.   Hematological: Negative for adenopathy. Does not bruise/bleed easily.   Psychiatric/Behavioral: Negative for agitation, behavioral problems, confusion, decreased concentration, self-injury, sleep disturbance and suicidal ideas. The patient is not nervous/anxious and is not hyperactive.          Past Medical History   Diagnosis Date    Adenoma of pituitary      benign, history of, no longer visible on CT scan after treatment    GERD (gastroesophageal reflux disease)     Hiatal hernia     History of kidney stones     Migraines     Sustained SVT      history of, no problem at this time     Past Surgical History   Procedure Laterality Date    Hysterectomy      Left foot       orthopedic repair    Pr exploratory of abdomen       for ruptured ectopic    Appendectomy      Cholecystectomy       lap baldev with scar revision    Cardiac catheterization      Incision and drainage of wound       spider bite to chest wall    Ganglion cyst excision       right wrist    Esophagogastroduodenoscopy      Colonoscopy w/ biopsies and polypectomy       Family History   Problem Relation Age of Onset    Cancer Mother      colon ca    Cancer Father      colon ca     Social History     Social History    Marital status: Single     Spouse name: N/A    Number of children: N/A    Years of education: N/A     Occupational History    Not on file.     Social History Main Topics    Smoking status: Never Smoker    Smokeless  tobacco: Never Used    Alcohol use Yes      Comment: rarely    Drug use: No    Sexual activity: Not on file     Other Topics Concern    Not on file     Social History Narrative     Review of patient's allergies indicates:   Allergen Reactions    Adhesive      Sensitive to Steri-strips     Guaifenesin     Levaquin [levofloxacin]     Guaifenesin-sodium citrate Rash       Current Outpatient Prescriptions:     cyclobenzaprine (FLEXERIL) 10 MG tablet, Take 10 mg by mouth 3 (three) times daily as needed., Disp: , Rfl:     esomeprazole (NEXIUM) 40 MG capsule, Take 40 mg by mouth before breakfast., Disp: , Rfl:     hydrochlorothiazide (MICROZIDE) 12.5 mg capsule, , Disp: , Rfl:     losartan (COZAAR) 25 MG tablet, , Disp: , Rfl:     naproxen (EC NAPROSYN) 500 MG EC tablet, Take 500 mg by mouth daily as needed., Disp: , Rfl:     potassium chloride (MICRO-K) 10 MEQ CpSR, Take 20 mEq by mouth once daily., Disp: , Rfl:     SYNTHROID 50 mcg tablet, , Disp: , Rfl:       Objective:      Vitals:    11/09/17 1437   BP: 99/68   Pulse: 108   Resp: 18     Body mass index is 34.23 kg/m².    Physical Exam    Constitutional:   She appears well-developed and well-nourished. She is well groomed    HENT:    Head: Atraumatic, oral and nasal mucosa intact  Eyes: Conjunctivae and EOM are normal. Pupils are equal, round, and reactive to light OU  Neck: Neck supple. No thyromegaly present  Cardiovascular: Normal rate and normal heart sounds  No murmur heard  Pulmonary/Chest: Effort normal and breath sounds normal  Musculoskeletal: Normal range of motion. No joint stiffness. No vertebral point tenderness  Skin: Skin is warm and dry  Psychiatric: Normal mood and affect     Neuro exam:    Mental status:  Awake, attentive, Alert, oriented to self, place, year and month  Language function is intact    Cranial Nerves:  Smell was not formally evaluated  Cranial Nerves II - XII: intact  Pursuits were smooth, normal saccades, no nystagmus  OU  Funduscopic exam - disc were flat and pink, no exudates or hemorrhages OU  Motor - facial movement was symmetrical and normal     Palate moved well and was symmetrical with normal palatal and oral sensation  Tongue movements were full    Coordination:     Rapid alternating movements and rapid finger tapping - normal bilaterally  Finger to nose - normal and symmetric bilaterally   Heel to shin test - normal and symmetric bilaterally   Arm roll - smooth and symmetric   No intentional or positional tremor.     Motor:  Normal muscle bulk and symmetry. No fasciculations were noted    No pronator drift  Strength  5/5 bilaterally     Reflexes:  Tendon reflexes were 2 + at biceps, triceps, brachioradialis, patellar, and Achilles bilaterally  On plantar stimulation toes were down going bilaterally  No clonus was noted     Sensory: Intact to light touch, pin prick in all extremities, except minimal decrease in dorsum of left hand and right forehead (10% decrease)    Gait: Romberg absent. Normal gait. Normal arm swing and turns. Normal postural reflexes.     Review of Data: MRI of the brain, unremarkable      Assessment and Plan   Hemiplegic Migraine, resolved after second round of Botox Treatment. Previously tried Topamax, Depakote, Tofranil, Atenolol, Metropolol, Nadolol, Propanolol (former did not work), Imitrex (triptan syndrome); Flexeril + Naproxen shortens duration of symptomatology; acupuncture did not help   Continue combination of Naproxen and flexeril in the event of an acute attack    RTC in 6 months        Eulalia Paniagua M.D  Medical Director, Headache and Facial Pain  [facility]

## 2020-05-09 PROBLEM — K56.609 SBO (SMALL BOWEL OBSTRUCTION): Status: ACTIVE | Noted: 2020-05-09

## 2021-01-13 ENCOUNTER — PATIENT MESSAGE (OUTPATIENT)
Dept: FAMILY MEDICINE | Facility: CLINIC | Age: 56
End: 2021-01-13

## 2021-02-22 ENCOUNTER — HOSPITAL ENCOUNTER (OUTPATIENT)
Dept: RADIOLOGY | Facility: HOSPITAL | Age: 56
Discharge: HOME | End: 2021-02-22
Attending: INTERNAL MEDICINE
Payer: COMMERCIAL

## 2021-02-22 ENCOUNTER — OFFICE VISIT (OUTPATIENT)
Dept: FAMILY MEDICINE | Facility: CLINIC | Age: 56
End: 2021-02-22
Payer: COMMERCIAL

## 2021-02-22 VITALS
BODY MASS INDEX: 35.23 KG/M2 | WEIGHT: 179.44 LBS | HEIGHT: 60 IN | SYSTOLIC BLOOD PRESSURE: 118 MMHG | DIASTOLIC BLOOD PRESSURE: 82 MMHG

## 2021-02-22 DIAGNOSIS — M54.50 CHRONIC MIDLINE LOW BACK PAIN WITHOUT SCIATICA: ICD-10-CM

## 2021-02-22 DIAGNOSIS — M83.9 VITAMIN D DEFICIENT OSTEOMALACIA: ICD-10-CM

## 2021-02-22 DIAGNOSIS — M54.50 CHRONIC MIDLINE LOW BACK PAIN WITHOUT SCIATICA: Primary | ICD-10-CM

## 2021-02-22 DIAGNOSIS — M54.9 DORSALGIA, UNSPECIFIED: ICD-10-CM

## 2021-02-22 DIAGNOSIS — E66.9 OBESITY (BMI 30-39.9): ICD-10-CM

## 2021-02-22 DIAGNOSIS — G43.909 MIGRAINE WITHOUT STATUS MIGRAINOSUS, NOT INTRACTABLE, UNSPECIFIED MIGRAINE TYPE: ICD-10-CM

## 2021-02-22 DIAGNOSIS — G89.29 CHRONIC MIDLINE LOW BACK PAIN WITHOUT SCIATICA: ICD-10-CM

## 2021-02-22 DIAGNOSIS — E03.9 HYPOTHYROIDISM, UNSPECIFIED TYPE: ICD-10-CM

## 2021-02-22 DIAGNOSIS — I10 ESSENTIAL HYPERTENSION: ICD-10-CM

## 2021-02-22 DIAGNOSIS — Z00.00 WELLNESS EXAMINATION: ICD-10-CM

## 2021-02-22 DIAGNOSIS — G89.29 CHRONIC MIDLINE LOW BACK PAIN WITHOUT SCIATICA: Primary | ICD-10-CM

## 2021-02-22 PROCEDURE — 72100 X-RAY EXAM L-S SPINE 2/3 VWS: CPT | Mod: TC,PN

## 2021-02-22 PROCEDURE — 99214 OFFICE O/P EST MOD 30 MIN: CPT | Mod: S$GLB,,, | Performed by: INTERNAL MEDICINE

## 2021-02-22 PROCEDURE — 99214 PR OFFICE/OUTPT VISIT, EST, LEVL IV, 30-39 MIN: ICD-10-PCS | Mod: S$GLB,,, | Performed by: INTERNAL MEDICINE

## 2021-02-22 PROCEDURE — 72100 XR LUMBAR SPINE AP AND LATERAL: ICD-10-PCS | Mod: 26,,, | Performed by: RADIOLOGY

## 2021-02-22 PROCEDURE — 99999 PR PBB SHADOW E&M-EST. PATIENT-LVL IV: CPT | Mod: PBBFAC,,, | Performed by: INTERNAL MEDICINE

## 2021-02-22 PROCEDURE — 72100 X-RAY EXAM L-S SPINE 2/3 VWS: CPT | Mod: 26,,, | Performed by: RADIOLOGY

## 2021-02-22 PROCEDURE — 99999 PR PBB SHADOW E&M-EST. PATIENT-LVL IV: ICD-10-PCS | Mod: PBBFAC,,, | Performed by: INTERNAL MEDICINE

## 2021-02-22 RX ORDER — HYDROCHLOROTHIAZIDE 12.5 MG/1
12.5 CAPSULE ORAL DAILY
Qty: 90 CAPSULE | Refills: 3 | Status: SHIPPED | OUTPATIENT
Start: 2021-02-22 | End: 2022-02-24

## 2021-02-22 RX ORDER — LEVOCETIRIZINE DIHYDROCHLORIDE 5 MG/1
1 TABLET, FILM COATED ORAL DAILY
COMMUNITY
Start: 2021-01-12 | End: 2021-10-18 | Stop reason: SDUPTHER

## 2021-02-22 RX ORDER — LORAZEPAM 0.5 MG/1
0.5 TABLET ORAL DAILY PRN
Qty: 30 TABLET | Refills: 0 | Status: SHIPPED | OUTPATIENT
Start: 2021-02-22 | End: 2021-05-06 | Stop reason: SDUPTHER

## 2021-02-22 RX ORDER — LORAZEPAM 0.5 MG/1
1 TABLET ORAL DAILY PRN
COMMUNITY
End: 2021-02-22 | Stop reason: SDUPTHER

## 2021-02-22 RX ORDER — IBANDRONATE SODIUM 150 MG/1
150 TABLET, FILM COATED ORAL
COMMUNITY
Start: 2021-01-12 | End: 2023-06-29 | Stop reason: SDUPTHER

## 2021-02-22 RX ORDER — SUCRALFATE 1 G/1
1 TABLET ORAL 2 TIMES DAILY
Qty: 180 TABLET | Refills: 3 | Status: SHIPPED | OUTPATIENT
Start: 2021-02-22 | End: 2022-02-24

## 2021-02-22 RX ORDER — LOSARTAN POTASSIUM 25 MG/1
25 TABLET ORAL DAILY
Qty: 90 TABLET | Refills: 2 | Status: SHIPPED | OUTPATIENT
Start: 2021-02-22 | End: 2021-11-26 | Stop reason: SDUPTHER

## 2021-02-23 ENCOUNTER — PATIENT MESSAGE (OUTPATIENT)
Dept: FAMILY MEDICINE | Facility: CLINIC | Age: 56
End: 2021-02-23

## 2021-02-25 DIAGNOSIS — Z12.31 OTHER SCREENING MAMMOGRAM: ICD-10-CM

## 2021-02-27 LAB
25(OH)D3 SERPL-MCNC: 44 NG/ML (ref 30–100)
ALBUMIN SERPL-MCNC: 4 G/DL (ref 3.6–5.1)
ALBUMIN/GLOB SERPL: 1.5 (CALC) (ref 1–2.5)
ALP SERPL-CCNC: 67 U/L (ref 37–153)
ALT SERPL-CCNC: 19 U/L (ref 6–29)
AST SERPL-CCNC: 17 U/L (ref 10–35)
BASOPHILS # BLD AUTO: 89 CELLS/UL (ref 0–200)
BASOPHILS NFR BLD AUTO: 1.1 %
BILIRUB SERPL-MCNC: 0.4 MG/DL (ref 0.2–1.2)
BUN SERPL-MCNC: 20 MG/DL (ref 7–25)
BUN/CREAT SERPL: NORMAL (CALC) (ref 6–22)
CALCIUM SERPL-MCNC: 9.8 MG/DL (ref 8.6–10.4)
CHLORIDE SERPL-SCNC: 105 MMOL/L (ref 98–110)
CHOLEST SERPL-MCNC: 202 MG/DL
CHOLEST/HDLC SERPL: 3.2 (CALC)
CO2 SERPL-SCNC: 30 MMOL/L (ref 20–32)
CREAT SERPL-MCNC: 0.71 MG/DL (ref 0.5–1.05)
EOSINOPHIL # BLD AUTO: 243 CELLS/UL (ref 15–500)
EOSINOPHIL NFR BLD AUTO: 3 %
ERYTHROCYTE [DISTWIDTH] IN BLOOD BY AUTOMATED COUNT: 12.9 % (ref 11–15)
GFRSERPLBLD MDRD-ARVRAT: 95 ML/MIN/1.73M2
GLOBULIN SER CALC-MCNC: 2.7 G/DL (CALC) (ref 1.9–3.7)
GLUCOSE SERPL-MCNC: 94 MG/DL (ref 65–99)
HCT VFR BLD AUTO: 40.2 % (ref 35–45)
HDLC SERPL-MCNC: 63 MG/DL
HGB BLD-MCNC: 13.4 G/DL (ref 11.7–15.5)
LDLC SERPL CALC-MCNC: 111 MG/DL (CALC)
LYMPHOCYTES # BLD AUTO: 3653 CELLS/UL (ref 850–3900)
LYMPHOCYTES NFR BLD AUTO: 45.1 %
MCH RBC QN AUTO: 29.8 PG (ref 27–33)
MCHC RBC AUTO-ENTMCNC: 33.3 G/DL (ref 32–36)
MCV RBC AUTO: 89.3 FL (ref 80–100)
MONOCYTES # BLD AUTO: 535 CELLS/UL (ref 200–950)
MONOCYTES NFR BLD AUTO: 6.6 %
NEUTROPHILS # BLD AUTO: 3580 CELLS/UL (ref 1500–7800)
NEUTROPHILS NFR BLD AUTO: 44.2 %
NONHDLC SERPL-MCNC: 139 MG/DL (CALC)
PLATELET # BLD AUTO: 368 THOUSAND/UL (ref 140–400)
PMV BLD REES-ECKER: 9.5 FL (ref 7.5–12.5)
POTASSIUM SERPL-SCNC: 4.9 MMOL/L (ref 3.5–5.3)
PROT SERPL-MCNC: 6.7 G/DL (ref 6.1–8.1)
RBC # BLD AUTO: 4.5 MILLION/UL (ref 3.8–5.1)
SODIUM SERPL-SCNC: 142 MMOL/L (ref 135–146)
T3FREE SERPL-MCNC: 3.3 PG/ML (ref 2.3–4.2)
T4 FREE SERPL-MCNC: 1.4 NG/DL (ref 0.8–1.8)
TRIGL SERPL-MCNC: 162 MG/DL
TSH SERPL-ACNC: 2.06 MIU/L (ref 0.4–4.5)
WBC # BLD AUTO: 8.1 THOUSAND/UL (ref 3.8–10.8)

## 2021-03-07 PROBLEM — G43.909 MIGRAINE WITHOUT STATUS MIGRAINOSUS, NOT INTRACTABLE: Status: ACTIVE | Noted: 2021-03-07

## 2021-03-07 PROBLEM — I10 ESSENTIAL HYPERTENSION: Status: ACTIVE | Noted: 2021-03-07

## 2021-03-16 ENCOUNTER — PATIENT MESSAGE (OUTPATIENT)
Dept: FAMILY MEDICINE | Facility: CLINIC | Age: 56
End: 2021-03-16

## 2021-03-19 DIAGNOSIS — Z12.31 OTHER SCREENING MAMMOGRAM: ICD-10-CM

## 2021-03-21 ENCOUNTER — PATIENT MESSAGE (OUTPATIENT)
Dept: FAMILY MEDICINE | Facility: CLINIC | Age: 56
End: 2021-03-21

## 2021-03-21 DIAGNOSIS — Z00.00 WELLNESS EXAMINATION: ICD-10-CM

## 2021-03-21 DIAGNOSIS — R21 RASH: Primary | ICD-10-CM

## 2021-03-23 ENCOUNTER — PATIENT OUTREACH (OUTPATIENT)
Dept: ADMINISTRATIVE | Facility: HOSPITAL | Age: 56
End: 2021-03-23

## 2021-03-24 ENCOUNTER — PATIENT MESSAGE (OUTPATIENT)
Dept: FAMILY MEDICINE | Facility: CLINIC | Age: 56
End: 2021-03-24

## 2021-03-24 LAB
HSV1 IGG SER IA-ACNC: 44 INDEX
HSV2 IGG SER IA-ACNC: 7.1 INDEX

## 2021-04-01 ENCOUNTER — PATIENT MESSAGE (OUTPATIENT)
Dept: FAMILY MEDICINE | Facility: CLINIC | Age: 56
End: 2021-04-01

## 2021-04-02 RX ORDER — ERGOCALCIFEROL 1.25 MG/1
300000 CAPSULE ORAL
Qty: 3 CAPSULE | Refills: 12 | Status: SHIPPED | OUTPATIENT
Start: 2021-04-02 | End: 2023-05-15

## 2021-04-04 ENCOUNTER — PATIENT MESSAGE (OUTPATIENT)
Dept: FAMILY MEDICINE | Facility: CLINIC | Age: 56
End: 2021-04-04

## 2021-04-05 ENCOUNTER — PATIENT MESSAGE (OUTPATIENT)
Dept: FAMILY MEDICINE | Facility: CLINIC | Age: 56
End: 2021-04-05

## 2021-04-05 ENCOUNTER — OFFICE VISIT (OUTPATIENT)
Dept: FAMILY MEDICINE | Facility: CLINIC | Age: 56
End: 2021-04-05
Payer: COMMERCIAL

## 2021-04-05 VITALS — TEMPERATURE: 100 F | DIASTOLIC BLOOD PRESSURE: 93 MMHG | SYSTOLIC BLOOD PRESSURE: 123 MMHG | OXYGEN SATURATION: 98 %

## 2021-04-05 DIAGNOSIS — J40 BRONCHITIS: Primary | ICD-10-CM

## 2021-04-05 DIAGNOSIS — B00.9 HSV INFECTION: ICD-10-CM

## 2021-04-05 DIAGNOSIS — J06.9 UPPER RESPIRATORY TRACT INFECTION, UNSPECIFIED TYPE: ICD-10-CM

## 2021-04-05 DIAGNOSIS — I10 ESSENTIAL HYPERTENSION: ICD-10-CM

## 2021-04-05 DIAGNOSIS — J30.89 ENVIRONMENTAL AND SEASONAL ALLERGIES: ICD-10-CM

## 2021-04-05 PROCEDURE — 99214 PR OFFICE/OUTPT VISIT, EST, LEVL IV, 30-39 MIN: ICD-10-PCS | Mod: 95,,, | Performed by: INTERNAL MEDICINE

## 2021-04-05 PROCEDURE — 99214 OFFICE O/P EST MOD 30 MIN: CPT | Mod: 95,,, | Performed by: INTERNAL MEDICINE

## 2021-04-05 RX ORDER — AZITHROMYCIN 250 MG/1
TABLET, FILM COATED ORAL
Qty: 6 TABLET | Refills: 0 | Status: SHIPPED | OUTPATIENT
Start: 2021-04-05 | End: 2021-04-10

## 2021-04-05 RX ORDER — VALACYCLOVIR HYDROCHLORIDE 1 G/1
TABLET, FILM COATED ORAL
Qty: 30 TABLET | Refills: 3 | Status: SHIPPED | OUTPATIENT
Start: 2021-04-05 | End: 2022-06-10 | Stop reason: SDUPTHER

## 2021-04-05 RX ORDER — PREDNISONE 10 MG/1
10 TABLET ORAL 2 TIMES DAILY
Qty: 10 TABLET | Refills: 0 | Status: SHIPPED | OUTPATIENT
Start: 2021-04-05 | End: 2021-04-10

## 2021-04-05 RX ORDER — FLUTICASONE PROPIONATE 50 MCG
1 SPRAY, SUSPENSION (ML) NASAL DAILY PRN
COMMUNITY

## 2021-04-05 RX ORDER — HYDROCODONE POLISTIREX AND CHLORPHENIRAMINE POLISTIREX 10; 8 MG/5ML; MG/5ML
5 SUSPENSION, EXTENDED RELEASE ORAL EVERY 12 HOURS PRN
Qty: 70 ML | Refills: 0 | Status: SHIPPED | OUTPATIENT
Start: 2021-04-05 | End: 2021-04-12

## 2021-04-05 RX ORDER — ALBUTEROL SULFATE 90 UG/1
1 AEROSOL, METERED RESPIRATORY (INHALATION) EVERY 4 HOURS PRN
Qty: 18 G | Refills: 0 | Status: SHIPPED | OUTPATIENT
Start: 2021-04-05 | End: 2021-04-26

## 2021-04-06 ENCOUNTER — PATIENT MESSAGE (OUTPATIENT)
Dept: ADMINISTRATIVE | Facility: HOSPITAL | Age: 56
End: 2021-04-06

## 2021-05-06 ENCOUNTER — PATIENT MESSAGE (OUTPATIENT)
Dept: FAMILY MEDICINE | Facility: CLINIC | Age: 56
End: 2021-05-06

## 2021-05-06 RX ORDER — HYDROCHLOROTHIAZIDE 12.5 MG/1
12.5 CAPSULE ORAL DAILY
Qty: 90 CAPSULE | Refills: 3 | Status: CANCELLED | OUTPATIENT
Start: 2021-05-06

## 2021-05-06 RX ORDER — LORAZEPAM 0.5 MG/1
0.5 TABLET ORAL DAILY PRN
Qty: 30 TABLET | Refills: 0 | Status: SHIPPED | OUTPATIENT
Start: 2021-05-06 | End: 2021-05-11 | Stop reason: SDUPTHER

## 2021-05-06 RX ORDER — POTASSIUM CHLORIDE 750 MG/1
20 TABLET, EXTENDED RELEASE ORAL DAILY PRN
Qty: 90 TABLET | Refills: 0 | Status: SHIPPED | OUTPATIENT
Start: 2021-05-06 | End: 2021-05-11 | Stop reason: SDUPTHER

## 2021-05-10 ENCOUNTER — PATIENT MESSAGE (OUTPATIENT)
Dept: FAMILY MEDICINE | Facility: CLINIC | Age: 56
End: 2021-05-10

## 2021-05-11 RX ORDER — LORAZEPAM 0.5 MG/1
0.5 TABLET ORAL DAILY PRN
Qty: 30 TABLET | Refills: 0 | Status: SHIPPED | OUTPATIENT
Start: 2021-05-11 | End: 2021-08-23 | Stop reason: SDUPTHER

## 2021-05-11 RX ORDER — POTASSIUM CHLORIDE 750 MG/1
20 TABLET, EXTENDED RELEASE ORAL DAILY PRN
Qty: 90 TABLET | Refills: 0 | Status: SHIPPED | OUTPATIENT
Start: 2021-05-11 | End: 2021-08-02

## 2021-06-02 LAB — BCS RECOMMENDATION EXT: NORMAL

## 2021-07-07 ENCOUNTER — PATIENT MESSAGE (OUTPATIENT)
Dept: ADMINISTRATIVE | Facility: HOSPITAL | Age: 56
End: 2021-07-07

## 2021-08-16 ENCOUNTER — PATIENT MESSAGE (OUTPATIENT)
Dept: FAMILY MEDICINE | Facility: CLINIC | Age: 56
End: 2021-08-16

## 2021-08-23 ENCOUNTER — OFFICE VISIT (OUTPATIENT)
Dept: FAMILY MEDICINE | Facility: CLINIC | Age: 56
End: 2021-08-23
Payer: COMMERCIAL

## 2021-08-23 VITALS
HEIGHT: 60 IN | BODY MASS INDEX: 35.79 KG/M2 | WEIGHT: 182.31 LBS | SYSTOLIC BLOOD PRESSURE: 110 MMHG | DIASTOLIC BLOOD PRESSURE: 74 MMHG

## 2021-08-23 DIAGNOSIS — Z00.00 WELLNESS EXAMINATION: Primary | ICD-10-CM

## 2021-08-23 DIAGNOSIS — E66.9 OBESITY (BMI 30-39.9): ICD-10-CM

## 2021-08-23 DIAGNOSIS — D12.6 COLON ADENOMA: ICD-10-CM

## 2021-08-23 DIAGNOSIS — J30.89 ENVIRONMENTAL AND SEASONAL ALLERGIES: ICD-10-CM

## 2021-08-23 DIAGNOSIS — E03.9 HYPOTHYROIDISM, UNSPECIFIED TYPE: ICD-10-CM

## 2021-08-23 DIAGNOSIS — I10 ESSENTIAL HYPERTENSION: ICD-10-CM

## 2021-08-23 DIAGNOSIS — K21.9 GASTROESOPHAGEAL REFLUX DISEASE, UNSPECIFIED WHETHER ESOPHAGITIS PRESENT: ICD-10-CM

## 2021-08-23 DIAGNOSIS — E78.2 MIXED HYPERLIPIDEMIA: ICD-10-CM

## 2021-08-23 DIAGNOSIS — Z78.0 MENOPAUSE: ICD-10-CM

## 2021-08-23 DIAGNOSIS — M81.0 AGE-RELATED OSTEOPOROSIS WITHOUT CURRENT PATHOLOGICAL FRACTURE: ICD-10-CM

## 2021-08-23 DIAGNOSIS — B00.9 HSV INFECTION: ICD-10-CM

## 2021-08-23 DIAGNOSIS — G43.409 HEMIPLEGIC MIGRAINE WITHOUT STATUS MIGRAINOSUS, NOT INTRACTABLE: ICD-10-CM

## 2021-08-23 DIAGNOSIS — E55.9 VITAMIN D DEFICIENCY: ICD-10-CM

## 2021-08-23 PROCEDURE — 99396 PR PREVENTIVE VISIT,EST,40-64: ICD-10-PCS | Mod: S$GLB,,, | Performed by: INTERNAL MEDICINE

## 2021-08-23 PROCEDURE — 99999 PR PBB SHADOW E&M-EST. PATIENT-LVL IV: CPT | Mod: PBBFAC,,, | Performed by: INTERNAL MEDICINE

## 2021-08-23 PROCEDURE — 99396 PREV VISIT EST AGE 40-64: CPT | Mod: S$GLB,,, | Performed by: INTERNAL MEDICINE

## 2021-08-23 PROCEDURE — 99999 PR PBB SHADOW E&M-EST. PATIENT-LVL IV: ICD-10-PCS | Mod: PBBFAC,,, | Performed by: INTERNAL MEDICINE

## 2021-08-23 RX ORDER — LORAZEPAM 0.5 MG/1
0.5 TABLET ORAL DAILY PRN
Qty: 30 TABLET | Refills: 0 | Status: SHIPPED | OUTPATIENT
Start: 2021-08-23 | End: 2021-12-28 | Stop reason: SDUPTHER

## 2021-08-24 ENCOUNTER — PATIENT OUTREACH (OUTPATIENT)
Dept: ADMINISTRATIVE | Facility: HOSPITAL | Age: 56
End: 2021-08-24

## 2021-08-28 ENCOUNTER — PATIENT OUTREACH (OUTPATIENT)
Dept: ADMINISTRATIVE | Facility: HOSPITAL | Age: 56
End: 2021-08-28

## 2021-08-28 LAB
ALBUMIN SERPL-MCNC: 4.4 G/DL (ref 3.6–5.1)
ALBUMIN/GLOB SERPL: 1.8 (CALC) (ref 1–2.5)
ALP SERPL-CCNC: 75 U/L (ref 37–153)
ALT SERPL-CCNC: 24 U/L (ref 6–29)
AST SERPL-CCNC: 18 U/L (ref 10–35)
BILIRUB SERPL-MCNC: 0.6 MG/DL (ref 0.2–1.2)
BUN SERPL-MCNC: 18 MG/DL (ref 7–25)
BUN/CREAT SERPL: NORMAL (CALC) (ref 6–22)
CALCIUM SERPL-MCNC: 9.6 MG/DL (ref 8.6–10.4)
CHLORIDE SERPL-SCNC: 103 MMOL/L (ref 98–110)
CHOLEST SERPL-MCNC: 186 MG/DL
CHOLEST/HDLC SERPL: 3.4 (CALC)
CO2 SERPL-SCNC: 30 MMOL/L (ref 20–32)
CREAT SERPL-MCNC: 0.69 MG/DL (ref 0.5–1.05)
GLOBULIN SER CALC-MCNC: 2.5 G/DL (CALC) (ref 1.9–3.7)
GLUCOSE SERPL-MCNC: 87 MG/DL (ref 65–99)
HDLC SERPL-MCNC: 55 MG/DL
LDLC SERPL CALC-MCNC: 102 MG/DL (CALC)
NONHDLC SERPL-MCNC: 131 MG/DL (CALC)
POTASSIUM SERPL-SCNC: 4.5 MMOL/L (ref 3.5–5.3)
PROT SERPL-MCNC: 6.9 G/DL (ref 6.1–8.1)
SODIUM SERPL-SCNC: 140 MMOL/L (ref 135–146)
T3FREE SERPL-MCNC: 3.2 PG/ML (ref 2.3–4.2)
T4 FREE SERPL-MCNC: 1.5 NG/DL (ref 0.8–1.8)
TRIGL SERPL-MCNC: 175 MG/DL
TSH SERPL-ACNC: 1.39 MIU/L (ref 0.4–4.5)

## 2021-10-17 ENCOUNTER — PATIENT MESSAGE (OUTPATIENT)
Dept: FAMILY MEDICINE | Facility: CLINIC | Age: 56
End: 2021-10-17
Payer: COMMERCIAL

## 2021-10-18 RX ORDER — LEVOCETIRIZINE DIHYDROCHLORIDE 5 MG/1
5 TABLET, FILM COATED ORAL DAILY
Qty: 90 TABLET | Refills: 3 | Status: SHIPPED | OUTPATIENT
Start: 2021-10-18 | End: 2022-10-17

## 2021-10-21 ENCOUNTER — PATIENT MESSAGE (OUTPATIENT)
Dept: FAMILY MEDICINE | Facility: CLINIC | Age: 56
End: 2021-10-21
Payer: COMMERCIAL

## 2021-10-21 DIAGNOSIS — Z86.79 HISTORY OF SUPRAVENTRICULAR TACHYCARDIA: ICD-10-CM

## 2021-10-21 DIAGNOSIS — I20.1 CORONARY ARTERY SPASM: ICD-10-CM

## 2021-10-21 DIAGNOSIS — E66.9 OBESITY (BMI 30-39.9): ICD-10-CM

## 2021-10-21 DIAGNOSIS — G43.409 HEMIPLEGIC MIGRAINE WITHOUT STATUS MIGRAINOSUS, NOT INTRACTABLE: ICD-10-CM

## 2021-10-21 DIAGNOSIS — Z86.018 HISTORY OF PITUITARY ADENOMA: ICD-10-CM

## 2021-11-05 ENCOUNTER — PATIENT MESSAGE (OUTPATIENT)
Dept: FAMILY MEDICINE | Facility: CLINIC | Age: 56
End: 2021-11-05
Payer: COMMERCIAL

## 2021-11-08 ENCOUNTER — OFFICE VISIT (OUTPATIENT)
Dept: FAMILY MEDICINE | Facility: CLINIC | Age: 56
End: 2021-11-08
Payer: COMMERCIAL

## 2021-11-08 VITALS
WEIGHT: 180.13 LBS | HEIGHT: 60 IN | BODY MASS INDEX: 35.37 KG/M2 | DIASTOLIC BLOOD PRESSURE: 84 MMHG | SYSTOLIC BLOOD PRESSURE: 136 MMHG

## 2021-11-08 DIAGNOSIS — R10.9 ABDOMINAL PAIN, UNSPECIFIED ABDOMINAL LOCATION: ICD-10-CM

## 2021-11-08 DIAGNOSIS — Z87.448 HISTORY OF PYELONEPHRITIS: ICD-10-CM

## 2021-11-08 DIAGNOSIS — Z87.442 HISTORY OF KIDNEY STONES: ICD-10-CM

## 2021-11-08 DIAGNOSIS — R30.0 DYSURIA: ICD-10-CM

## 2021-11-08 DIAGNOSIS — N30.90 CYSTITIS: Primary | ICD-10-CM

## 2021-11-08 DIAGNOSIS — R10.9 BILATERAL FLANK PAIN: ICD-10-CM

## 2021-11-08 LAB
BILIRUB SERPL-MCNC: ABNORMAL MG/DL
BLOOD URINE, POC: 250
CLARITY, POC UA: ABNORMAL
COLOR, POC UA: ABNORMAL
GLUCOSE UR QL STRIP: NORMAL
KETONES UR QL STRIP: ABNORMAL
LEUKOCYTE ESTERASE URINE, POC: ABNORMAL
NITRITE, POC UA: ABNORMAL
PH, POC UA: 7
PROTEIN, POC: ABNORMAL
SPECIFIC GRAVITY, POC UA: 1.01
UROBILINOGEN, POC UA: NORMAL

## 2021-11-08 PROCEDURE — 99999 PR PBB SHADOW E&M-EST. PATIENT-LVL IV: CPT | Mod: PBBFAC,,, | Performed by: INTERNAL MEDICINE

## 2021-11-08 PROCEDURE — 81002 URINALYSIS NONAUTO W/O SCOPE: CPT | Mod: S$GLB,,, | Performed by: INTERNAL MEDICINE

## 2021-11-08 PROCEDURE — 81002 POCT URINE DIPSTICK WITHOUT MICROSCOPE: ICD-10-PCS | Mod: S$GLB,,, | Performed by: INTERNAL MEDICINE

## 2021-11-08 PROCEDURE — 87088 URINE BACTERIA CULTURE: CPT | Performed by: INTERNAL MEDICINE

## 2021-11-08 PROCEDURE — 99999 PR PBB SHADOW E&M-EST. PATIENT-LVL IV: ICD-10-PCS | Mod: PBBFAC,,, | Performed by: INTERNAL MEDICINE

## 2021-11-08 PROCEDURE — 96372 PR INJECTION,THERAP/PROPH/DIAG2ST, IM OR SUBCUT: ICD-10-PCS | Mod: S$GLB,,, | Performed by: INTERNAL MEDICINE

## 2021-11-08 PROCEDURE — 87077 CULTURE AEROBIC IDENTIFY: CPT | Performed by: INTERNAL MEDICINE

## 2021-11-08 PROCEDURE — 87086 URINE CULTURE/COLONY COUNT: CPT | Performed by: INTERNAL MEDICINE

## 2021-11-08 PROCEDURE — 99214 OFFICE O/P EST MOD 30 MIN: CPT | Mod: 25,S$GLB,, | Performed by: INTERNAL MEDICINE

## 2021-11-08 PROCEDURE — 99214 PR OFFICE/OUTPT VISIT, EST, LEVL IV, 30-39 MIN: ICD-10-PCS | Mod: 25,S$GLB,, | Performed by: INTERNAL MEDICINE

## 2021-11-08 PROCEDURE — 87186 SC STD MICRODIL/AGAR DIL: CPT | Performed by: INTERNAL MEDICINE

## 2021-11-08 PROCEDURE — 96372 THER/PROPH/DIAG INJ SC/IM: CPT | Mod: S$GLB,,, | Performed by: INTERNAL MEDICINE

## 2021-11-08 RX ORDER — PHENAZOPYRIDINE HYDROCHLORIDE 200 MG/1
200 TABLET, FILM COATED ORAL 3 TIMES DAILY PRN
Qty: 6 TABLET | Refills: 0 | Status: SHIPPED | OUTPATIENT
Start: 2021-11-08 | End: 2021-11-11

## 2021-11-08 RX ORDER — CIPROFLOXACIN 250 MG/1
250 TABLET, FILM COATED ORAL EVERY 12 HOURS
Qty: 10 TABLET | Refills: 0 | Status: SHIPPED | OUTPATIENT
Start: 2021-11-08 | End: 2021-11-13

## 2021-11-08 RX ORDER — CEFTRIAXONE 1 G/1
1 INJECTION, POWDER, FOR SOLUTION INTRAMUSCULAR; INTRAVENOUS
Status: COMPLETED | OUTPATIENT
Start: 2021-11-08 | End: 2021-11-08

## 2021-11-08 RX ADMIN — CEFTRIAXONE 1 G: 1 INJECTION, POWDER, FOR SOLUTION INTRAMUSCULAR; INTRAVENOUS at 03:11

## 2021-11-10 LAB — BACTERIA UR CULT: ABNORMAL

## 2021-12-07 ENCOUNTER — PATIENT MESSAGE (OUTPATIENT)
Dept: FAMILY MEDICINE | Facility: CLINIC | Age: 56
End: 2021-12-07
Payer: COMMERCIAL

## 2022-01-05 ENCOUNTER — PATIENT MESSAGE (OUTPATIENT)
Dept: FAMILY MEDICINE | Facility: CLINIC | Age: 57
End: 2022-01-05
Payer: COMMERCIAL

## 2022-01-05 DIAGNOSIS — E78.2 MIXED HYPERLIPIDEMIA: ICD-10-CM

## 2022-01-05 DIAGNOSIS — Z00.00 WELLNESS EXAMINATION: Primary | ICD-10-CM

## 2022-01-05 DIAGNOSIS — E03.9 HYPOTHYROIDISM, UNSPECIFIED TYPE: ICD-10-CM

## 2022-01-05 DIAGNOSIS — I10 ESSENTIAL HYPERTENSION: ICD-10-CM

## 2022-02-21 ENCOUNTER — OFFICE VISIT (OUTPATIENT)
Dept: FAMILY MEDICINE | Facility: CLINIC | Age: 57
End: 2022-02-21
Payer: COMMERCIAL

## 2022-02-21 ENCOUNTER — PATIENT OUTREACH (OUTPATIENT)
Dept: ADMINISTRATIVE | Facility: HOSPITAL | Age: 57
End: 2022-02-21
Payer: COMMERCIAL

## 2022-02-21 VITALS
RESPIRATION RATE: 18 BRPM | DIASTOLIC BLOOD PRESSURE: 86 MMHG | HEIGHT: 60 IN | BODY MASS INDEX: 36.33 KG/M2 | SYSTOLIC BLOOD PRESSURE: 122 MMHG | HEART RATE: 62 BPM | WEIGHT: 185.06 LBS

## 2022-02-21 DIAGNOSIS — J30.89 ENVIRONMENTAL AND SEASONAL ALLERGIES: ICD-10-CM

## 2022-02-21 DIAGNOSIS — I10 ESSENTIAL HYPERTENSION: Primary | ICD-10-CM

## 2022-02-21 DIAGNOSIS — K21.9 GASTROESOPHAGEAL REFLUX DISEASE, UNSPECIFIED WHETHER ESOPHAGITIS PRESENT: ICD-10-CM

## 2022-02-21 DIAGNOSIS — E03.9 HYPOTHYROIDISM, UNSPECIFIED TYPE: ICD-10-CM

## 2022-02-21 DIAGNOSIS — Z12.11 SCREEN FOR COLON CANCER: ICD-10-CM

## 2022-02-21 DIAGNOSIS — Z12.11 ENCOUNTER FOR SCREENING COLONOSCOPY: ICD-10-CM

## 2022-02-21 PROCEDURE — 99214 OFFICE O/P EST MOD 30 MIN: CPT | Mod: S$GLB,,, | Performed by: INTERNAL MEDICINE

## 2022-02-21 PROCEDURE — 99214 PR OFFICE/OUTPT VISIT, EST, LEVL IV, 30-39 MIN: ICD-10-PCS | Mod: S$GLB,,, | Performed by: INTERNAL MEDICINE

## 2022-02-21 PROCEDURE — 99999 PR PBB SHADOW E&M-EST. PATIENT-LVL IV: CPT | Mod: PBBFAC,,, | Performed by: INTERNAL MEDICINE

## 2022-02-21 PROCEDURE — 99999 PR PBB SHADOW E&M-EST. PATIENT-LVL IV: ICD-10-PCS | Mod: PBBFAC,,, | Performed by: INTERNAL MEDICINE

## 2022-02-21 RX ORDER — LOSARTAN POTASSIUM 25 MG/1
25 TABLET ORAL DAILY
Qty: 90 TABLET | Refills: 3 | Status: SHIPPED | OUTPATIENT
Start: 2022-02-21 | End: 2022-08-22 | Stop reason: SDUPTHER

## 2022-02-21 RX ORDER — LORAZEPAM 0.5 MG/1
0.5 TABLET ORAL DAILY PRN
Qty: 30 TABLET | Refills: 0 | Status: SHIPPED | OUTPATIENT
Start: 2022-02-21 | End: 2022-06-10 | Stop reason: SDUPTHER

## 2022-02-21 RX ORDER — NAPROXEN 500 MG/1
500 TABLET ORAL DAILY PRN
Qty: 90 TABLET | Refills: 1 | Status: SHIPPED | OUTPATIENT
Start: 2022-02-21 | End: 2023-09-18 | Stop reason: SDUPTHER

## 2022-02-21 RX ORDER — AZELASTINE 1 MG/ML
1 SPRAY, METERED NASAL 2 TIMES DAILY
Qty: 30 ML | Refills: 0 | Status: SHIPPED | OUTPATIENT
Start: 2022-02-21 | End: 2022-03-21

## 2022-02-21 NOTE — LETTER
AUTHORIZATION FOR RELEASE OF   CONFIDENTIAL INFORMATION    Dear [facility],    We are seeing Nena Franco, date of birth 1965, in the clinic at [facility]. Patrick Shi MD is the patient's PCP. Nena Franco has an outstanding lab/procedure at the time we reviewed her chart. In order to help keep her health information updated, she has authorized us to request the following medical record(s):        (X)  MAMMOGRAM                                      (  )  COLONOSCOPY      (  )  PAP SMEAR                                          (  )  OUTSIDE LAB RESULTS     (  )  DEXA SCAN                                          (  )  EYE EXAM            (  )  FOOT EXAM                                          (  )  ENTIRE RECORD     (  )  OUTSIDE IMMUNIZATIONS                 (  )  _______________         Please fax records to [facility], Brandon D Simon-Davis, MD, [phone]    If you have any questions, please contact Sedrick Tobin LPN Care Coordinator  at [phone].              Patient Name: Nena Franco  : 1965  Patient Phone #: [phone]

## 2022-02-21 NOTE — LETTER
AUTHORIZATION FOR RELEASE OF   CONFIDENTIAL INFORMATION    Dear [facility],    We are seeing Nena Franco, date of birth 1965, in the clinic at [facility]. Patrick Shi MD is the patient's PCP. Nena Franco has an outstanding lab/procedure at the time we reviewed her chart. In order to help keep her health information updated, she has authorized us to request the following medical record(s):        (X)  MAMMOGRAM                                      (  )  COLONOSCOPY      (  )  PAP SMEAR                                          (  )  OUTSIDE LAB RESULTS     (  )  DEXA SCAN                                          (  )  EYE EXAM            (  )  FOOT EXAM                                          (  )  ENTIRE RECORD     (  )  OUTSIDE IMMUNIZATIONS                 (  )  _______________         Please fax records to [facility], Brandon D Simon-Davis, MD, [phone]    If you have any questions, please contact Sedrick Tobin LPN Care Coordinator  at [phone].            Patient Name: Nena Franco  : 1965  Patient Phone #: [phone]

## 2022-02-21 NOTE — PROGRESS NOTES
Subjective:       Patient ID: Nena Franco is a 57 y.o. female.    Chief Complaint: Follow-up    HPI  Gyn: PEPE &BSO    MMG:  May 2021 womens BR   Dexa: yes :  2018 lumbar -3.3 hips -2.3 /osteoporosis.  Rx boniva - //has order   Colonoscopy:   11/22/17  Adenoma r/c 5  //Dr Woo due refer  Immunizations: Flu: refused Tdap:  2010 recommend need  Shingles:  Recommend Covid: no worried defers due to history of blood clots   Smoker:  Never     PND-- cant take early spring and Oct can take Mucinex due to rash.  Doing all other medications will add Astelin.      Hypertension: controlled:  Rx Losartan 25, HCTZ 12.5   Hyperlipidemia:  Elevated triglycerides 162   Heart palpitations:  history of SVT, controlled  Lower back pain and more on left side:  On and off;  Using patch's OTC help.  P.r.n. Flexeril.  Hypothyroid; controlled brand not cover levo  100. //TPO TB antibody negative.  Chronic GERD; Rx Protonix 40 daily, Carafate 1 pill usually daily.  Management GI  Migraines:  Complex hemiplegic.  Currently stable Previously took Botox Dr. Paniagua.  Improved with CBD oil OTC.     Imitrex: worse ss  Vitamin-D deficiency:  Supplement O2 vitamin-D  Metabolic syndrome BMI 36. Recommend less than 1200 calories daily low-fat less than 40 g low carb is less than 100 daily  HSV 1-2 recurrent: prn break out buttock on off for years.  Rx Valtrex    Review of Systems:  Review of Systems   Constitutional: Negative for appetite change.   HENT: Negative for nosebleeds.    Eyes: Negative for pain.   Respiratory: Negative for choking.    Cardiovascular: Negative for chest pain.   Gastrointestinal: Negative for blood in stool.   Genitourinary: Negative for hematuria.   Musculoskeletal: Negative for joint swelling.   Skin: Negative for pallor.   Neurological: Negative for facial asymmetry.   Hematological: Does not bruise/bleed easily.   Psychiatric/Behavioral: Negative for confusion.       Objective:     Vitals:    02/21/22 1118   BP:  122/86   Pulse: 62   Resp: 18   Weight: 84 kg (185 lb 1.2 oz)   Height: 5' (1.524 m)          Physical Exam  Vitals reviewed.   Constitutional:       Appearance: Normal appearance.   HENT:      Head: Normocephalic and atraumatic.      Mouth/Throat:      Pharynx: Oropharynx is clear.   Eyes:      Extraocular Movements: Extraocular movements intact.      Conjunctiva/sclera: Conjunctivae normal.      Pupils: Pupils are equal, round, and reactive to light.   Cardiovascular:      Rate and Rhythm: Normal rate and regular rhythm.      Heart sounds: Normal heart sounds.   Pulmonary:      Effort: Pulmonary effort is normal.      Breath sounds: Normal breath sounds.   Abdominal:      General: Bowel sounds are normal.      Palpations: Abdomen is soft.   Musculoskeletal:         General: Normal range of motion.      Cervical back: Normal range of motion and neck supple.   Skin:     General: Skin is warm and dry.   Neurological:      General: No focal deficit present.      Mental Status: She is alert and oriented to person, place, and time.   Psychiatric:         Mood and Affect: Mood normal.         Medication List with Changes/Refills   New Medications    AZELASTINE (ASTELIN) 137 MCG (0.1 %) NASAL SPRAY    1 spray (137 mcg total) by Nasal route 2 (two) times daily. Allergies prn   Current Medications    CYCLOBENZAPRINE (FLEXERIL) 10 MG TABLET    Take 10 mg by mouth nightly as needed (migraine headache).     ERGOCALCIFEROL (VITAMIN D2) 50,000 UNIT CAP    Take 6 capsules (300,000 Units total) by mouth every 30 days.    FLUTICASONE PROPIONATE (FLONASE) 50 MCG/ACTUATION NASAL SPRAY    1 spray by Each Nostril route once daily.    HYDROCHLOROTHIAZIDE (MICROZIDE) 12.5 MG CAPSULE    Take 1 capsule (12.5 mg total) by mouth once daily.    IBANDRONATE (BONIVA) 150 MG TABLET    Take 150 mg by mouth every 30 days.    LEVOCETIRIZINE (XYZAL) 5 MG TABLET    Take 1 tablet (5 mg total) by mouth once daily.    LEVOTHYROXINE (SYNTHROID) 100 MCG  TABLET    TAKE 1 TABLET BY MOUTH EVERY MORNING ON AN EMPTY STOMACH    ONDANSETRON (ZOFRAN-ODT) 4 MG TBDL    Take 1 tablet (4 mg total) by mouth every 6 (six) hours as needed.    PANTOPRAZOLE (PROTONIX) 40 MG TABLET    Take 40 mg by mouth every evening.     POTASSIUM CHLORIDE SA (K-DUR,KLOR-CON) 10 MEQ TABLET    TAKE 2 TABLETS BY MOUTH EVERY DAY AS NEEDED FOR CRAMPS    SUCRALFATE (CARAFATE) 1 GRAM TABLET    Take 1 tablet (1 g total) by mouth 2 (two) times daily.    VALACYCLOVIR (VALTREX) 1000 MG TABLET    2 tabs po Q 12 hrs x 2 days (repeat as needed cold sore)   Changed and/or Refilled Medications    Modified Medication Previous Medication    LORAZEPAM (ATIVAN) 0.5 MG TABLET LORazepam (ATIVAN) 0.5 MG tablet       Take 1 tablet (0.5 mg total) by mouth daily as needed for Anxiety.    Take 1 tablet (0.5 mg total) by mouth daily as needed for Anxiety.    LOSARTAN (COZAAR) 25 MG TABLET losartan (COZAAR) 25 MG tablet       Take 1 tablet (25 mg total) by mouth once daily.    TAKE 1 TABLET BY MOUTH EVERY DAY    NAPROXEN (EC NAPROSYN) 500 MG EC TABLET naproxen (EC NAPROSYN) 500 MG EC tablet       Take 1 tablet (500 mg total) by mouth daily as needed (migraine headaches).    Take 500 mg by mouth daily as needed (migraine headaches).        Assessment & Plan:  1. Essential hypertension    2. Environmental and seasonal allergies    3. Gastroesophageal reflux disease, unspecified whether esophagitis present    4. Screen for colon cancer    5. Encounter for screening colonoscopy  - Ambulatory referral/consult to Gastroenterology; Future     Essential hypertension    Environmental and seasonal allergies    Gastroesophageal reflux disease, unspecified whether esophagitis present    Screen for colon cancer    Encounter for screening colonoscopy  -     Ambulatory referral/consult to Gastroenterology; Future; Expected date: 02/28/2022    Other orders  -     azelastine (ASTELIN) 137 mcg (0.1 %) nasal spray; 1 spray (137 mcg total) by  Nasal route 2 (two) times daily. Allergies prn  Dispense: 30 mL; Refill: 0  -     losartan (COZAAR) 25 MG tablet; Take 1 tablet (25 mg total) by mouth once daily.  Dispense: 90 tablet; Refill: 3  -     naproxen (EC NAPROSYN) 500 MG EC tablet; Take 1 tablet (500 mg total) by mouth daily as needed (migraine headaches).  Dispense: 90 tablet; Refill: 1  -     LORazepam (ATIVAN) 0.5 MG tablet; Take 1 tablet (0.5 mg total) by mouth daily as needed for Anxiety.  Dispense: 30 tablet; Refill: 0        Continue to work on regular exercise, maintain healthy weight, balanced diet. Avoid unhealthy habits: smoking, excessive alcohol intake.

## 2022-02-23 NOTE — TELEPHONE ENCOUNTER
Care Due:                  Date            Visit Type   Department     Provider  --------------------------------------------------------------------------------                                EP -                              PRIMARY      [facility]  Last Visit: 02-      CARE ([facility])   MEDICINE       Patrick Shi  Next Visit: None Scheduled  None         None Found                                                            Last  Test          Frequency    Reason                     Performed    Due Date  --------------------------------------------------------------------------------    CBC.........  12 months..  valACYclovir.............  05- 02-    Powered by [employer] Reference number: [id number].   2/23/2022 2:50:35 AM CST

## 2022-02-24 RX ORDER — SUCRALFATE 1 G/1
1 TABLET ORAL 2 TIMES DAILY
Qty: 180 TABLET | Refills: 3 | Status: SHIPPED | OUTPATIENT
Start: 2022-02-24 | End: 2023-09-28

## 2022-02-24 RX ORDER — HYDROCHLOROTHIAZIDE 12.5 MG/1
12.5 CAPSULE ORAL DAILY
Qty: 90 CAPSULE | Refills: 1 | Status: SHIPPED | OUTPATIENT
Start: 2022-02-24 | End: 2022-08-22 | Stop reason: SDUPTHER

## 2022-02-24 NOTE — TELEPHONE ENCOUNTER
Refill Routing Note   Medication(s) are not appropriate for processing by [facility] for the following reason(s):      - Outside of protocol    [facility] action(s):  Route  Quick Discontinue Medication-related problems identified: Requires labs     Medication Therapy Plan: Labs (CBC); ROUTE: sucralfate (oop); quickdc: hctz (sent 2 days ago)  --->Care Gap information included in message below if applicable.   Medication reconciliation completed: No   Automatic Epic Generated Protocol Data:        Requested Prescriptions   Pending Prescriptions Disp Refills    sucralfate (CARAFATE) 1 gram tablet [Pharmacy Med Name: SUCRALFATE 1 GM TABLET] 180 tablet 3     Sig: TAKE 1 TABLET (1 G TOTAL) BY MOUTH 2 (TWO) TIMES DAILY.       There is no refill protocol information for this order       hydroCHLOROthiazide (MICROZIDE) 12.5 mg capsule [Pharmacy Med Name: HYDROCHLOROTHIAZIDE 12.5 MG CP] 90 capsule 1     Sig: Take 1 capsule (12.5 mg total) by mouth once daily.       Cardiovascular: Diuretics - Thiazide Passed - 2/23/2022  2:50 AM        Passed - Patient is at least 18 years old        Passed - Last BP in normal range within 360 days     BP Readings from Last 1 Encounters:   02/21/22 122/86               Passed - Valid encounter within last 15 months     Recent Visits  Date Type Provider Dept   02/21/22 Office Visit Patrick Shi MD [facility]   11/08/21 Office Visit Patrick Shi MD [facility]   08/23/21 Office Visit Patrick Shi MD [facility]   04/05/21 Office Visit Patrick Shi MD [facility]   02/22/21 Office Visit Patrick Shi MD [facility]   Showing recent visits within past 720 days and meeting all other requirements  Future Appointments  No visits were found meeting these conditions.  Showing future appointments within next 150 days and meeting all other requirements                Passed - Cr is 1.39 or below  and within 360 days     Lab Results   Component Value Date    CREATININE 0.69 08/27/2021    CREATININE 0.71 02/26/2021    CREATININE 0.61 05/08/2020    POCCRE 0.8 02/08/2018              Passed - K in normal range and within 360 days     Potassium   Date Value Ref Range Status   08/27/2021 4.5 3.5 - 5.3 mmol/L Final   02/26/2021 4.9 3.5 - 5.3 mmol/L Final   05/08/2020 3.7 3.5 - 5.1 mmol/L Final              Passed - Na is between 130 and 148 and within 360 days     Sodium   Date Value Ref Range Status   08/27/2021 140 135 - 146 mmol/L Final   02/26/2021 142 135 - 146 mmol/L Final   05/08/2020 139 136 - 145 mmol/L Final              Passed - eGFR within 360 days     Lab Results   Component Value Date    EGFRNONAA 97 08/27/2021    EGFRNONAA 95 02/26/2021    EGFRNONAA >60 05/08/2020                      Appointments  past 12m or future 3m with PCP    Date Provider   Last Visit   2/21/2022 Patrick Shi MD   Next Visit   Visit date not found Patrick Shi MD   ED visits in past 90 days: 0        Note composed:3:25 PM 02/24/2022      Quick DC. Request already responded to by other means (e.g. phone or fax)   Refill Authorization Note   Nena Franco  is requesting a refill authorization.  Brief Assessment and Rationale for Refill:  Route  Quick Discontinue  Medication Therapy Plan:  Labs (CBC); ROUTE: sucralfate (oop); quickdc: hctz (sent 2 days ago)    Medication Reconciliation Completed:  No    Medication-related problems identified: Requires labs   Comments:   Pended Medication(s)       Requested Prescriptions     Pending Prescriptions Disp Refills    sucralfate (CARAFATE) 1 gram tablet [Pharmacy Med Name: SUCRALFATE 1 GM TABLET] 180 tablet 3     Sig: TAKE 1 TABLET (1 G TOTAL) BY MOUTH 2 (TWO) TIMES DAILY.    hydroCHLOROthiazide (MICROZIDE) 12.5 mg capsule [Pharmacy Med Name: HYDROCHLOROTHIAZIDE 12.5 MG CP] 90 capsule 1     Sig: Take 1 capsule (12.5 mg total) by mouth once daily.         Duplicate Pended Encounter(s)/ Last Prescribed Details: (includes pharmacy & prescriber details)   Ordering User:  Patrick Shi MD            Pharmacy    [facility]/pharm[facility] #[id number] - [place] [address] [address]   Phone:  [phone]  Fax:  [phone]   TAMMY #:  [id number]   ADAM Reason: --       Outpatient Medication Detail     Disp Refills Start End ADAM   hydroCHLOROthiazide (MICROZIDE) 12.5 mg capsule 90 capsule 3 2/22/2021  No   Sig - Route: Take 1 capsule (12.5 mg total) by mouth once daily. - Oral   Sent to pharmacy as: hydroCHLOROthiazide (MICROZIDE) 12.5 mg capsule   Class: Normal   Notes to Pharmacy: .   Order: [id number]   Date/Time Signed: 2/22/2021 10:12       E-Prescribing Status: Receipt confirmed by pharmacy (2/22/2021 11:14 AM CST)       Ordering Encounter Report    Associated Reports   View Encounter              Note composed:3:24 PM 02/24/2022

## 2022-03-08 LAB
ALBUMIN SERPL-MCNC: 4.3 G/DL (ref 3.6–5.1)
ALBUMIN/GLOB SERPL: 1.5 (CALC) (ref 1–2.5)
ALP SERPL-CCNC: 80 U/L (ref 37–153)
ALT SERPL-CCNC: 28 U/L (ref 6–29)
AST SERPL-CCNC: 20 U/L (ref 10–35)
BASOPHILS # BLD AUTO: 87 CELLS/UL (ref 0–200)
BASOPHILS NFR BLD AUTO: 1 %
BILIRUB SERPL-MCNC: 0.6 MG/DL (ref 0.2–1.2)
BUN SERPL-MCNC: 15 MG/DL (ref 7–25)
BUN/CREAT SERPL: NORMAL (CALC) (ref 6–22)
CALCIUM SERPL-MCNC: 10.2 MG/DL (ref 8.6–10.4)
CHLORIDE SERPL-SCNC: 102 MMOL/L (ref 98–110)
CHOLEST SERPL-MCNC: 197 MG/DL
CHOLEST/HDLC SERPL: 3.2 (CALC)
CO2 SERPL-SCNC: 31 MMOL/L (ref 20–32)
CREAT SERPL-MCNC: 0.71 MG/DL (ref 0.5–1.05)
EOSINOPHIL # BLD AUTO: 252 CELLS/UL (ref 15–500)
EOSINOPHIL NFR BLD AUTO: 2.9 %
ERYTHROCYTE [DISTWIDTH] IN BLOOD BY AUTOMATED COUNT: 12.9 % (ref 11–15)
GLOBULIN SER CALC-MCNC: 2.8 G/DL (CALC) (ref 1.9–3.7)
GLUCOSE SERPL-MCNC: 96 MG/DL (ref 65–99)
HCT VFR BLD AUTO: 41.7 % (ref 35–45)
HDLC SERPL-MCNC: 61 MG/DL
HGB BLD-MCNC: 13.4 G/DL (ref 11.7–15.5)
LDLC SERPL CALC-MCNC: 111 MG/DL (CALC)
LYMPHOCYTES # BLD AUTO: 3898 CELLS/UL (ref 850–3900)
LYMPHOCYTES NFR BLD AUTO: 44.8 %
MCH RBC QN AUTO: 28.9 PG (ref 27–33)
MCHC RBC AUTO-ENTMCNC: 32.1 G/DL (ref 32–36)
MCV RBC AUTO: 89.9 FL (ref 80–100)
MONOCYTES # BLD AUTO: 609 CELLS/UL (ref 200–950)
MONOCYTES NFR BLD AUTO: 7 %
NEUTROPHILS # BLD AUTO: 3854 CELLS/UL (ref 1500–7800)
NEUTROPHILS NFR BLD AUTO: 44.3 %
NONHDLC SERPL-MCNC: 136 MG/DL (CALC)
PLATELET # BLD AUTO: 393 THOUSAND/UL (ref 140–400)
PMV BLD REES-ECKER: 9.6 FL (ref 7.5–12.5)
POTASSIUM SERPL-SCNC: 4.4 MMOL/L (ref 3.5–5.3)
PROT SERPL-MCNC: 7.1 G/DL (ref 6.1–8.1)
RBC # BLD AUTO: 4.64 MILLION/UL (ref 3.8–5.1)
SODIUM SERPL-SCNC: 142 MMOL/L (ref 135–146)
TRIGL SERPL-MCNC: 141 MG/DL
TSH SERPL-ACNC: 1.73 MIU/L (ref 0.4–4.5)
WBC # BLD AUTO: 8.7 THOUSAND/UL (ref 3.8–10.8)

## 2022-03-10 ENCOUNTER — TELEPHONE (OUTPATIENT)
Dept: ADMINISTRATIVE | Facility: HOSPITAL | Age: 57
End: 2022-03-10
Payer: COMMERCIAL

## 2022-03-11 ENCOUNTER — PATIENT MESSAGE (OUTPATIENT)
Dept: FAMILY MEDICINE | Facility: CLINIC | Age: 57
End: 2022-03-11
Payer: COMMERCIAL

## 2022-03-11 DIAGNOSIS — R73.9 HYPERGLYCEMIA: Primary | ICD-10-CM

## 2022-03-14 ENCOUNTER — HOSPITAL ENCOUNTER (OUTPATIENT)
Dept: RADIOLOGY | Facility: HOSPITAL | Age: 57
Discharge: HOME | End: 2022-03-14
Attending: INTERNAL MEDICINE
Payer: COMMERCIAL

## 2022-03-14 DIAGNOSIS — E03.9 HYPOTHYROIDISM, UNSPECIFIED TYPE: ICD-10-CM

## 2022-03-14 DIAGNOSIS — Z78.0 MENOPAUSE: ICD-10-CM

## 2022-03-14 DIAGNOSIS — M81.0 AGE-RELATED OSTEOPOROSIS WITHOUT CURRENT PATHOLOGICAL FRACTURE: ICD-10-CM

## 2022-03-14 PROCEDURE — 77080 DEXA BONE DENSITY SPINE HIP: ICD-10-PCS | Mod: 26,,, | Performed by: RADIOLOGY

## 2022-03-14 PROCEDURE — 77080 DXA BONE DENSITY AXIAL: CPT | Mod: TC,PO

## 2022-03-14 PROCEDURE — 77080 DXA BONE DENSITY AXIAL: CPT | Mod: 26,,, | Performed by: RADIOLOGY

## 2022-03-16 LAB — HBA1C MFR BLD: 5.6 % OF TOTAL HGB

## 2022-04-20 ENCOUNTER — PATIENT MESSAGE (OUTPATIENT)
Dept: FAMILY MEDICINE | Facility: CLINIC | Age: 57
End: 2022-04-20
Payer: COMMERCIAL

## 2022-05-31 ENCOUNTER — PATIENT MESSAGE (OUTPATIENT)
Dept: FAMILY MEDICINE | Facility: CLINIC | Age: 57
End: 2022-05-31
Payer: COMMERCIAL

## 2022-06-10 ENCOUNTER — PATIENT MESSAGE (OUTPATIENT)
Dept: FAMILY MEDICINE | Facility: CLINIC | Age: 57
End: 2022-06-10
Payer: COMMERCIAL

## 2022-06-10 RX ORDER — VALACYCLOVIR HYDROCHLORIDE 1 G/1
TABLET, FILM COATED ORAL
Qty: 30 TABLET | Refills: 3 | Status: ON HOLD | OUTPATIENT
Start: 2022-06-10 | End: 2023-01-10 | Stop reason: SDUPTHER

## 2022-06-10 RX ORDER — LORAZEPAM 0.5 MG/1
0.5 TABLET ORAL DAILY PRN
Qty: 30 TABLET | Refills: 0 | Status: SHIPPED | OUTPATIENT
Start: 2022-06-10 | End: 2022-08-22 | Stop reason: SDUPTHER

## 2022-06-10 NOTE — TELEPHONE ENCOUNTER
No new care gaps identified.  [facility] Embedded Care Gaps. Reference number: [id number]. 6/10/2022   11:27:48 AM TRACET

## 2022-06-10 NOTE — TELEPHONE ENCOUNTER
Pt asking for refill of Toradol prescribed back in 2019, understands PCP is out of office and will wait

## 2022-06-20 RX ORDER — KETOROLAC TROMETHAMINE 10 MG/1
10 TABLET, FILM COATED ORAL EVERY 6 HOURS
Qty: 20 TABLET | Refills: 0 | Status: SHIPPED | OUTPATIENT
Start: 2022-06-20 | End: 2022-06-25

## 2022-06-22 DIAGNOSIS — Z12.31 OTHER SCREENING MAMMOGRAM: ICD-10-CM

## 2022-07-05 ENCOUNTER — OFFICE VISIT (OUTPATIENT)
Dept: URGENT CARE | Facility: CLINIC | Age: 57
End: 2022-07-05
Payer: COMMERCIAL

## 2022-07-05 VITALS
RESPIRATION RATE: 16 BRPM | HEART RATE: 62 BPM | HEIGHT: 60 IN | DIASTOLIC BLOOD PRESSURE: 85 MMHG | WEIGHT: 180 LBS | OXYGEN SATURATION: 100 % | TEMPERATURE: 98 F | SYSTOLIC BLOOD PRESSURE: 145 MMHG | BODY MASS INDEX: 35.34 KG/M2

## 2022-07-05 DIAGNOSIS — B96.89 ACUTE BACTERIAL BRONCHITIS: Primary | ICD-10-CM

## 2022-07-05 DIAGNOSIS — R05.9 COUGH: ICD-10-CM

## 2022-07-05 DIAGNOSIS — J20.8 ACUTE BACTERIAL BRONCHITIS: Primary | ICD-10-CM

## 2022-07-05 PROCEDURE — 99213 OFFICE O/P EST LOW 20 MIN: CPT | Mod: S$GLB,,, | Performed by: NURSE PRACTITIONER

## 2022-07-05 PROCEDURE — 99213 PR OFFICE/OUTPT VISIT, EST, LEVL III, 20-29 MIN: ICD-10-PCS | Mod: S$GLB,,, | Performed by: NURSE PRACTITIONER

## 2022-07-05 RX ORDER — AZITHROMYCIN 250 MG/1
250 TABLET, FILM COATED ORAL DAILY
Qty: 6 TABLET | Refills: 0 | Status: SHIPPED | OUTPATIENT
Start: 2022-07-05 | End: 2022-07-10

## 2022-07-05 RX ORDER — BENZONATATE 200 MG/1
200 CAPSULE ORAL 3 TIMES DAILY PRN
Qty: 30 CAPSULE | Refills: 0 | Status: SHIPPED | OUTPATIENT
Start: 2022-07-05 | End: 2022-07-15

## 2022-07-05 RX ORDER — FLUTICASONE PROPIONATE AND SALMETEROL 100; 50 UG/1; UG/1
1 POWDER RESPIRATORY (INHALATION) 2 TIMES DAILY
Qty: 60 EACH | Refills: 0 | Status: SHIPPED | OUTPATIENT
Start: 2022-07-05 | End: 2023-01-07 | Stop reason: CLARIF

## 2022-07-05 NOTE — PROGRESS NOTES
Subjective:       Patient ID: Nena Franco is a 57 y.o. female.    Vitals:  height is 5' (1.524 m) and weight is 81.6 kg (180 lb). Her oral temperature is 97.9 °F (36.6 °C). Her blood pressure is 145/85 (abnormal) and her pulse is 62. Her respiration is 16 and oxygen saturation is 100%.     Chief Complaint: Cough (Persistent cough. Very  sick June 12th-16th, fever and flu like s/s. Since then cough continues, takes my breath, leaves me tired - Entered by patient)    Pt presents to clinic today for persistent cough. Symptoms started 6/12/2022, reports initially felt feverish, achy, fatigued, reports that has improved but cough has continued. Cough hasn't gone away and has persisted over 3+ weeks. Reports history of bronchitis in the past and reports Advair has helped, requests prescription.     Cough  This is a new problem. The current episode started 1 to 4 weeks ago. The problem has been gradually improving. The problem occurs every few minutes. The cough is productive of sputum. Associated symptoms include headaches and postnasal drip. Pertinent negatives include no fever, myalgias, nasal congestion, rash, rhinorrhea, sore throat or shortness of breath. The symptoms are aggravated by other. She has tried OTC cough suppressant for the symptoms. The treatment provided mild relief. Her past medical history is significant for bronchitis and pneumonia. There is no history of asthma, COPD or emphysema.       Constitution: Negative for fever.   HENT: Positive for postnasal drip. Negative for sore throat.    Respiratory: Positive for cough. Negative for shortness of breath.    Musculoskeletal: Negative for muscle ache.   Skin: Negative for rash.   Neurological: Positive for headaches.       Objective:      Physical Exam   Constitutional: She is oriented to person, place, and time. She appears well-developed. She is cooperative.  Non-toxic appearance. She does not appear ill. No distress.   HENT:   Head: Normocephalic  and atraumatic.   Ears:   Right Ear: Hearing, tympanic membrane, external ear and ear canal normal.   Left Ear: Hearing, tympanic membrane, external ear and ear canal normal.   Nose: Congestion present. No mucosal edema, rhinorrhea or nasal deformity. No epistaxis. Right sinus exhibits no maxillary sinus tenderness and no frontal sinus tenderness. Left sinus exhibits no maxillary sinus tenderness and no frontal sinus tenderness.   Mouth/Throat: Uvula is midline and mucous membranes are normal. No trismus in the jaw. Normal dentition. No uvula swelling. Posterior oropharyngeal erythema and cobblestoning present. No oropharyngeal exudate or posterior oropharyngeal edema.   Eyes: Conjunctivae and lids are normal. No scleral icterus.   Neck: Trachea normal and phonation normal. Neck supple. No edema present. No erythema present. No neck rigidity present.   Cardiovascular: Normal rate, regular rhythm, normal heart sounds and normal pulses.   Pulmonary/Chest: Effort normal and breath sounds normal. No respiratory distress. She has no decreased breath sounds. She has no wheezes. She has no rhonchi. She has no rales.   Abdominal: Normal appearance.   Musculoskeletal: Normal range of motion.         General: No deformity. Normal range of motion.   Neurological: She is alert and oriented to person, place, and time. She exhibits normal muscle tone. Coordination normal.   Skin: Skin is warm, dry, intact, not diaphoretic and not pale.   Psychiatric: Her speech is normal and behavior is normal. Judgment and thought content normal.   Nursing note and vitals reviewed.        Assessment:       1. Acute bacterial bronchitis    2. Cough          Plan:     Discussed plan of care with patient.  Symptoms longer than 3+ weeks, patient treated for bacterial bronchitis. Patient requests advair rx, reports it has helped with cough and bronchitis in the past. Advised on indications and side effects. Patient agrees with plan of care.   Acute  bacterial bronchitis  -     azithromycin (ZITHROMAX Z-CURTIS) 250 MG tablet; Take 1 tablet (250 mg total) by mouth once daily. Take 2 tabs day 1 then 1 tab daily day 2-5 for 5 days  Dispense: 6 tablet; Refill: 0  -     fluticasone-salmeterol diskus inhaler 100-50 mcg; Inhale 1 puff into the lungs 2 (two) times daily. Controller  Dispense: 60 each; Refill: 0    Cough  -     benzonatate (TESSALON) 200 MG capsule; Take 1 capsule (200 mg total) by mouth 3 (three) times daily as needed for Cough.  Dispense: 30 capsule; Refill: 0

## 2022-07-06 ENCOUNTER — PATIENT MESSAGE (OUTPATIENT)
Dept: FAMILY MEDICINE | Facility: CLINIC | Age: 57
End: 2022-07-06
Payer: COMMERCIAL

## 2022-08-08 ENCOUNTER — PATIENT OUTREACH (OUTPATIENT)
Dept: ADMINISTRATIVE | Facility: HOSPITAL | Age: 57
End: 2022-08-08
Payer: COMMERCIAL

## 2022-08-08 ENCOUNTER — PATIENT MESSAGE (OUTPATIENT)
Dept: ADMINISTRATIVE | Facility: HOSPITAL | Age: 57
End: 2022-08-08
Payer: COMMERCIAL

## 2022-08-08 NOTE — PROGRESS NOTES
2022 Care Everywhere updates requested and reviewed.  Immunizations reconciled. Media reports reviewed.  Duplicate HM overrides and  orders removed.  Overdue HM topic chart audit and/or requested.  Overdue lab testing linked to upcoming lab appointments if applies.  DIS reviewed      Mammogram    Requested mammogram records     Health Maintenance Due   Topic Date Due    COVID-19 Vaccine (1) Never done    TETANUS VACCINE  Never done    Shingles Vaccine (1 of 2) Never done    Mammogram  2022

## 2022-08-08 NOTE — LETTER
August 15, 2022    Nena Franco  [address] [address] [address]             [facility]  [address]  [place] [address]  Phone: [phone] [facility] is committed to your overall health.  To help you get the most out of each of your visits, we will review your information to make sure you are up to date on all of your recommended tests and/or procedures.       Patrick Shi MD  has found that your chart shows you may be due for :         Health Maintenance Due   Topic    COVID-19 Vaccine     TETANUS VACCINE     Shingles Vaccine     Mammogram          If you have had any of the above done at another facility, please bring the records or information with you so that your record at [facility] will be complete.  If you would like to schedule any of these test, please call [phone] or send a message via [email or web address].        If you are currently taking medication, please bring it with you to your appointment for review.           Thank you for letting us care for you,        Patrick Shi MD and your [facility] Team

## 2022-08-08 NOTE — LETTER
AUTHORIZATION FOR RELEASE OF   CONFIDENTIAL INFORMATION    [facility],    We are seeing Nena Franco, date of birth 1965, in the clinic at [facility]. Patrick Shi MD is the patient's PCP. Nena Franco has an outstanding lab/procedure at the time we reviewed her chart. In order to help keep her health information updated, she has authorized us to request the following medical record(s):        (X)  MAMMOGRAM                                      (  )  COLONOSCOPY      (  )  PAP SMEAR                                          (  )  OUTSIDE LAB RESULTS     (  )  DEXA SCAN                                          (  )  EYE EXAM            (  )  FOOT EXAM                                          (  )  ENTIRE RECORD     (  )  OUTSIDE IMMUNIZATIONS                 (  )  _______________         Please fax records to [facility], Brandon D Simon-Davis, MD, [phone]    If you have any questions, please contact Sedrick Tobin LPN Care Coordinator  at [phone].              Patient Name: Nena Franco  : 1965  Patient Phone #: [phone]

## 2022-08-22 ENCOUNTER — OFFICE VISIT (OUTPATIENT)
Dept: FAMILY MEDICINE | Facility: CLINIC | Age: 57
End: 2022-08-22
Payer: COMMERCIAL

## 2022-08-22 VITALS
OXYGEN SATURATION: 97 % | WEIGHT: 184.63 LBS | HEART RATE: 77 BPM | SYSTOLIC BLOOD PRESSURE: 130 MMHG | BODY MASS INDEX: 36.25 KG/M2 | DIASTOLIC BLOOD PRESSURE: 80 MMHG | HEIGHT: 60 IN

## 2022-08-22 DIAGNOSIS — K21.00 GASTROESOPHAGEAL REFLUX DISEASE WITH ESOPHAGITIS, UNSPECIFIED WHETHER HEMORRHAGE: ICD-10-CM

## 2022-08-22 DIAGNOSIS — E66.9 OBESITY (BMI 30-39.9): ICD-10-CM

## 2022-08-22 DIAGNOSIS — Z00.00 WELLNESS EXAMINATION: Primary | ICD-10-CM

## 2022-08-22 DIAGNOSIS — D12.6 TUBULAR ADENOMA OF COLON: ICD-10-CM

## 2022-08-22 DIAGNOSIS — E03.9 HYPOTHYROIDISM, UNSPECIFIED TYPE: ICD-10-CM

## 2022-08-22 DIAGNOSIS — I10 ESSENTIAL HYPERTENSION: ICD-10-CM

## 2022-08-22 DIAGNOSIS — R73.02 GLUCOSE INTOLERANCE (IMPAIRED GLUCOSE TOLERANCE): ICD-10-CM

## 2022-08-22 DIAGNOSIS — R05.9 COUGH: ICD-10-CM

## 2022-08-22 DIAGNOSIS — F41.9 ANXIETY: ICD-10-CM

## 2022-08-22 DIAGNOSIS — Z01.89 ENCOUNTER FOR ROUTINE LABORATORY TESTING: ICD-10-CM

## 2022-08-22 DIAGNOSIS — J30.89 ENVIRONMENTAL AND SEASONAL ALLERGIES: ICD-10-CM

## 2022-08-22 PROCEDURE — 99999 PR PBB SHADOW E&M-EST. PATIENT-LVL IV: ICD-10-PCS | Mod: PBBFAC,,, | Performed by: INTERNAL MEDICINE

## 2022-08-22 PROCEDURE — 99396 PREV VISIT EST AGE 40-64: CPT | Mod: 25,S$GLB,, | Performed by: INTERNAL MEDICINE

## 2022-08-22 PROCEDURE — 90471 TDAP VACCINE GREATER THAN OR EQUAL TO 7YO IM: ICD-10-PCS | Mod: S$GLB,,, | Performed by: INTERNAL MEDICINE

## 2022-08-22 PROCEDURE — 99999 PR PBB SHADOW E&M-EST. PATIENT-LVL IV: CPT | Mod: PBBFAC,,, | Performed by: INTERNAL MEDICINE

## 2022-08-22 PROCEDURE — 90471 IMMUNIZATION ADMIN: CPT | Mod: S$GLB,,, | Performed by: INTERNAL MEDICINE

## 2022-08-22 PROCEDURE — 90715 TDAP VACCINE 7 YRS/> IM: CPT | Mod: S$GLB,,, | Performed by: INTERNAL MEDICINE

## 2022-08-22 PROCEDURE — 90715 TDAP VACCINE GREATER THAN OR EQUAL TO 7YO IM: ICD-10-PCS | Mod: S$GLB,,, | Performed by: INTERNAL MEDICINE

## 2022-08-22 PROCEDURE — 99396 PR PREVENTIVE VISIT,EST,40-64: ICD-10-PCS | Mod: 25,S$GLB,, | Performed by: INTERNAL MEDICINE

## 2022-08-22 RX ORDER — HYDROCHLOROTHIAZIDE 12.5 MG/1
12.5 CAPSULE ORAL DAILY
Qty: 90 CAPSULE | Refills: 3 | Status: SHIPPED | OUTPATIENT
Start: 2022-08-22 | End: 2022-12-05

## 2022-08-22 RX ORDER — LORAZEPAM 0.5 MG/1
0.5 TABLET ORAL DAILY PRN
Qty: 30 TABLET | Refills: 0 | Status: SHIPPED | OUTPATIENT
Start: 2022-08-22 | End: 2023-05-15 | Stop reason: SDUPTHER

## 2022-08-22 RX ORDER — MONTELUKAST SODIUM 10 MG/1
10 TABLET ORAL DAILY
Qty: 30 TABLET | Refills: 0 | Status: SHIPPED | OUTPATIENT
Start: 2022-08-22 | End: 2022-09-13 | Stop reason: SDUPTHER

## 2022-08-22 RX ORDER — LEVOTHYROXINE SODIUM 100 UG/1
TABLET ORAL
Qty: 90 TABLET | Refills: 3 | Status: SHIPPED | OUTPATIENT
Start: 2022-08-22 | End: 2023-05-15 | Stop reason: SDUPTHER

## 2022-08-22 RX ORDER — LOSARTAN POTASSIUM 25 MG/1
25 TABLET ORAL DAILY
Qty: 90 TABLET | Refills: 3 | Status: SHIPPED | OUTPATIENT
Start: 2022-08-22 | End: 2023-03-06

## 2022-08-22 NOTE — PROGRESS NOTES
Subjective:    old note in      Patient ID: Nena Franco is a 57 y.o. female.    Chief Complaint: Follow-up and Annual Exam    HPI  Gyn: PEPE &BSO    MMG:  May 2021 womens BR hold for this Friday 8/26   Dexa:  Osteoporosis  2018 lumbar -3.3 hips -2.3 //2022 L-3.0 & H -2.8 // Rx boniva   Colonoscopy:   11/22/17  Tub adenoma r/c 5  //Dr Woo   Immunizations: Flu: refused Tdap:  2022 Shingles: defer  Covid: defers    Smoker:  Never       June 2022 - friend got sick after trip - then she became sick few days later was sick for a week .  Did not test herself for COVID was a viral illness.  Complained of up to 102.5 fever, fatigue body aches cough.  Since then has had lingering cough episodic.   Cough has lingered since- sometimes feels like her vocal cords spasm will have hoarseness cough so hard sometimes will have bladder leaking.  Taking all of her allergy pills nasal spray occasional Advair inhaler does not seem to help.  Seen UC July 2022 - tx for bronchitis w zpak, improved then came back and took left over zpak from home minimal help.  Does have chronic GERD not sure if that is flared up does not feel like it is.      Allergies/reactive airway disease:  Increased symptoms early spring/October.  Rx daily H1 nasal spray, p.r.n. Advair    Chronic GERD with erosive esophagitis: Rx Protonix 40 daily, Carafate 1 1 daily. Mgmt Dr Woo  GI    Glucose intolerance:  Glucose 122 A1c 5.6    Had episode in July walking all the sudden land on the ground.  Did not have any dizziness headache or pre symptoms of syncope.  Was not confused did not have loss of consciousness never had again.  Does not feel like she tripped on any was not witnessed.  Scrapes but no re-injury    Hypertension: controlled:  Rx Losartan 25, HCTZ 12.5   Hyperlipidemia:  Elevated triglycerides 162   Heart palpitations:  Hx sustained SVT, coronary spasm.  Cardio Dr Weaver   Lower back pain and more on left side:  On and off;  Using patch's OTC help.   P.r.n. Flexeril.  Hypothyroid; controlled brand not covered Rx levo 100. //TPO TB antibody negative.  Migraines:  Complex hemiplegic.  Currently stable Previously took Botox Dr. Paniagua.  Improved with CBD oil OTC.  (SE: Imitrex: worse)   Vitamin-D deficiency:  Supplement O2 vitamin-D  Metabolic syndrome BMI 36. Weight 184.    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  Rx Valtrex    Review of Systems:  Review of Systems   Constitutional: Negative for appetite change.   HENT: Negative for nosebleeds.    Eyes: Negative for pain.   Respiratory: Positive for cough. Negative for choking.    Cardiovascular: Negative for chest pain.   Gastrointestinal: Negative for blood in stool and vomiting.   Genitourinary: Negative for hematuria.   Musculoskeletal: Negative for joint swelling.   Skin: Negative for pallor.   Neurological: Negative for facial asymmetry.   Hematological: Does not bruise/bleed easily.   Psychiatric/Behavioral: Negative for confusion.       Objective:     Vitals:    08/22/22 0946   BP: 130/80   Pulse: 77   SpO2: 97%   Weight: 83.7 kg (184 lb 10.2 oz)   Height: 5' (1.524 m)          Physical Exam  Vitals reviewed.   Constitutional:       Appearance: Normal appearance.   HENT:      Head: Normocephalic and atraumatic.      Mouth/Throat:      Pharynx: Oropharynx is clear.   Eyes:      Extraocular Movements: Extraocular movements intact.      Conjunctiva/sclera: Conjunctivae normal.      Pupils: Pupils are equal, round, and reactive to light.   Cardiovascular:      Rate and Rhythm: Normal rate and regular rhythm.      Heart sounds: Normal heart sounds.   Pulmonary:      Effort: Pulmonary effort is normal.      Breath sounds: Normal breath sounds.   Abdominal:      General: Bowel sounds are normal.      Palpations: Abdomen is soft.   Musculoskeletal:         General: Normal range of motion.      Cervical back: Normal range of motion and neck supple.   Skin:     General: Skin is warm and dry.    Neurological:      General: No focal deficit present.      Mental Status: She is alert and oriented to person, place, and time.   Psychiatric:         Mood and Affect: Mood normal.         Medication List with Changes/Refills   New Medications    MONTELUKAST (SINGULAIR) 10 MG TABLET    Take 1 tablet (10 mg total) by mouth once daily. Allergies   Current Medications    CYCLOBENZAPRINE (FLEXERIL) 10 MG TABLET    Take 10 mg by mouth nightly as needed (migraine headache).     ERGOCALCIFEROL (VITAMIN D2) 50,000 UNIT CAP    Take 6 capsules (300,000 Units total) by mouth every 30 days.    FLUTICASONE PROPIONATE (FLONASE) 50 MCG/ACTUATION NASAL SPRAY    1 spray by Each Nostril route once daily.    FLUTICASONE-SALMETEROL DISKUS INHALER 100-50 MCG    Inhale 1 puff into the lungs 2 (two) times daily. Controller    HYDROCHLOROTHIAZIDE (MICROZIDE) 12.5 MG CAPSULE    Take 1 capsule (12.5 mg total) by mouth once daily.    IBANDRONATE (BONIVA) 150 MG TABLET    Take 150 mg by mouth every 30 days.    LEVOCETIRIZINE (XYZAL) 5 MG TABLET    Take 1 tablet (5 mg total) by mouth once daily.    LEVOTHYROXINE (SYNTHROID) 100 MCG TABLET    TAKE 1 TABLET BY MOUTH EVERY MORNING ON AN EMPTY STOMACH    LORAZEPAM (ATIVAN) 0.5 MG TABLET    Take 1 tablet (0.5 mg total) by mouth daily as needed for Anxiety.    LOSARTAN (COZAAR) 25 MG TABLET    Take 1 tablet (25 mg total) by mouth once daily.    NAPROXEN (EC NAPROSYN) 500 MG EC TABLET    Take 1 tablet (500 mg total) by mouth daily as needed (migraine headaches).    ONDANSETRON (ZOFRAN-ODT) 4 MG TBDL    Take 1 tablet (4 mg total) by mouth every 6 (six) hours as needed.    PANTOPRAZOLE (PROTONIX) 40 MG TABLET    Take 40 mg by mouth every evening.     POTASSIUM CHLORIDE SA (K-DUR,KLOR-CON) 10 MEQ TABLET    TAKE 2 TABLETS BY MOUTH EVERY DAY AS NEEDED FOR CRAMPS    SUCRALFATE (CARAFATE) 1 GRAM TABLET    TAKE 1 TABLET (1 G TOTAL) BY MOUTH 2 (TWO) TIMES DAILY.    VALACYCLOVIR (VALTREX) 1000 MG TABLET    2  tabs po Q 12 hrs x 2 days (repeat as needed cold sore)       Assessment & Plan:  1. Wellness examination    2. Cough  - X-Ray Chest PA And Lateral; Future    3. Essential hypertension    4. Gastroesophageal reflux disease with esophagitis, unspecified whether hemorrhage    5. Glucose intolerance (impaired glucose tolerance)    6. Environmental and seasonal allergies  - montelukast (SINGULAIR) 10 mg tablet; Take 1 tablet (10 mg total) by mouth once daily. Allergies  Dispense: 30 tablet; Refill: 0    7. Hypothyroidism, unspecified type  - TSH; Future  - T4, Free; Future  - TSH  - T4, Free    8. Obesity (BMI 30-39.9)    9. Tubular adenoma of colon    10. Encounter for routine laboratory testing  - Comprehensive Metabolic Panel; Future  - TSH; Future  - T4, Free; Future  - Comprehensive Metabolic Panel  - TSH  - T4, Free     Wellness examination    Cough  -     X-Ray Chest PA And Lateral; Future; Expected date: 08/22/2022    Essential hypertension    Gastroesophageal reflux disease with esophagitis, unspecified whether hemorrhage    Glucose intolerance (impaired glucose tolerance)    Environmental and seasonal allergies  -     montelukast (SINGULAIR) 10 mg tablet; Take 1 tablet (10 mg total) by mouth once daily. Allergies  Dispense: 30 tablet; Refill: 0    Hypothyroidism, unspecified type  -     TSH; Future; Expected date: 08/22/2022  -     T4, Free; Future; Expected date: 08/22/2022    Obesity (BMI 30-39.9)    Tubular adenoma of colon    Encounter for routine laboratory testing  -     Comprehensive Metabolic Panel; Future; Expected date: 08/22/2022  -     TSH; Future; Expected date: 08/22/2022  -     T4, Free; Future; Expected date: 08/22/2022    Other orders  -     (In Office Administered) Tdap Vaccine        Continue to work on regular exercise, maintain healthy weight, balanced diet. Avoid unhealthy habits: smoking, excessive alcohol intake.

## 2022-08-24 ENCOUNTER — PATIENT MESSAGE (OUTPATIENT)
Dept: ADMINISTRATIVE | Facility: HOSPITAL | Age: 57
End: 2022-08-24
Payer: COMMERCIAL

## 2022-08-26 LAB — BCS RECOMMENDATION EXT: NORMAL

## 2022-08-31 ENCOUNTER — PATIENT MESSAGE (OUTPATIENT)
Dept: FAMILY MEDICINE | Facility: CLINIC | Age: 57
End: 2022-08-31
Payer: COMMERCIAL

## 2022-09-02 ENCOUNTER — PATIENT MESSAGE (OUTPATIENT)
Dept: FAMILY MEDICINE | Facility: CLINIC | Age: 57
End: 2022-09-02
Payer: COMMERCIAL

## 2022-09-02 NOTE — TELEPHONE ENCOUNTER
No new care gaps identified.  [facility] Embedded Care Gaps. Reference number: [id number]. 9/02/2022   2:53:27 PM CDT

## 2022-09-04 RX ORDER — PANTOPRAZOLE SODIUM 40 MG/1
40 TABLET, DELAYED RELEASE ORAL NIGHTLY
Qty: 90 TABLET | Refills: 0 | Status: SHIPPED | OUTPATIENT
Start: 2022-09-04

## 2022-09-13 LAB
ALBUMIN SERPL-MCNC: 4.5 G/DL (ref 3.6–5.1)
ALBUMIN/GLOB SERPL: 1.7 (CALC) (ref 1–2.5)
ALP SERPL-CCNC: 86 U/L (ref 37–153)
ALT SERPL-CCNC: 20 U/L (ref 6–29)
AST SERPL-CCNC: 15 U/L (ref 10–35)
BILIRUB SERPL-MCNC: 0.4 MG/DL (ref 0.2–1.2)
BUN SERPL-MCNC: 13 MG/DL (ref 7–25)
BUN/CREAT SERPL: NORMAL (CALC) (ref 6–22)
CALCIUM SERPL-MCNC: 9.5 MG/DL (ref 8.6–10.4)
CHLORIDE SERPL-SCNC: 101 MMOL/L (ref 98–110)
CO2 SERPL-SCNC: 27 MMOL/L (ref 20–32)
CREAT SERPL-MCNC: 0.74 MG/DL (ref 0.5–1.03)
EGFR: 94 ML/MIN/1.73M2
GLOBULIN SER CALC-MCNC: 2.6 G/DL (CALC) (ref 1.9–3.7)
GLUCOSE SERPL-MCNC: 86 MG/DL (ref 65–99)
POTASSIUM SERPL-SCNC: 4.4 MMOL/L (ref 3.5–5.3)
PROT SERPL-MCNC: 7.1 G/DL (ref 6.1–8.1)
SODIUM SERPL-SCNC: 140 MMOL/L (ref 135–146)
T4 FREE SERPL-MCNC: 1.4 NG/DL (ref 0.8–1.8)
TSH SERPL-ACNC: 1.53 MIU/L (ref 0.4–4.5)

## 2022-10-10 ENCOUNTER — PATIENT MESSAGE (OUTPATIENT)
Dept: ADMINISTRATIVE | Facility: HOSPITAL | Age: 57
End: 2022-10-10
Payer: COMMERCIAL

## 2022-10-20 ENCOUNTER — TELEPHONE (OUTPATIENT)
Dept: FAMILY MEDICINE | Facility: CLINIC | Age: 57
End: 2022-10-20
Payer: COMMERCIAL

## 2022-10-20 NOTE — TELEPHONE ENCOUNTER
----- Message from Noreen Fairbanks sent at 10/20/2022  1:01 PM CDT -----  Contact: Pt [phone]  Pt is requesting an appoint for Reprated syncope.     Please advise Thank you

## 2022-10-20 NOTE — TELEPHONE ENCOUNTER
----- Message from Seng England sent at 10/20/2022  4:05 PM CDT -----  Regarding: pt called  Who Called: LONNIE JOHNSTON [[id number]]    Who Left Message for Patient:Bessie    Does the patient know what this is regarding?schedule appt for Reprated syncope    Best Call Back Number:Telephone Information:  Mobile          [phone]          Additional Information:

## 2022-10-24 ENCOUNTER — OFFICE VISIT (OUTPATIENT)
Dept: FAMILY MEDICINE | Facility: CLINIC | Age: 57
End: 2022-10-24
Payer: COMMERCIAL

## 2022-10-24 ENCOUNTER — PATIENT MESSAGE (OUTPATIENT)
Dept: FAMILY MEDICINE | Facility: CLINIC | Age: 57
End: 2022-10-24
Payer: COMMERCIAL

## 2022-10-24 VITALS
WEIGHT: 188.06 LBS | HEART RATE: 117 BPM | HEIGHT: 60 IN | SYSTOLIC BLOOD PRESSURE: 116 MMHG | DIASTOLIC BLOOD PRESSURE: 72 MMHG | BODY MASS INDEX: 36.92 KG/M2

## 2022-10-24 DIAGNOSIS — I47.10 PAROXYSMAL SVT (SUPRAVENTRICULAR TACHYCARDIA): ICD-10-CM

## 2022-10-24 DIAGNOSIS — I10 ESSENTIAL HYPERTENSION: ICD-10-CM

## 2022-10-24 DIAGNOSIS — R55 PRE-SYNCOPE: Primary | ICD-10-CM

## 2022-10-24 DIAGNOSIS — R00.0 TACHYCARDIA: ICD-10-CM

## 2022-10-24 DIAGNOSIS — R42 DIZZINESS AND GIDDINESS: ICD-10-CM

## 2022-10-24 PROCEDURE — 99214 OFFICE O/P EST MOD 30 MIN: CPT | Mod: S$GLB,,, | Performed by: INTERNAL MEDICINE

## 2022-10-24 PROCEDURE — 99999 PR PBB SHADOW E&M-EST. PATIENT-LVL V: ICD-10-PCS | Mod: PBBFAC,,, | Performed by: INTERNAL MEDICINE

## 2022-10-24 PROCEDURE — 93010 EKG 12-LEAD: ICD-10-PCS | Mod: S$GLB,,, | Performed by: INTERNAL MEDICINE

## 2022-10-24 PROCEDURE — 93005 EKG 12-LEAD: ICD-10-PCS | Mod: S$GLB,,, | Performed by: INTERNAL MEDICINE

## 2022-10-24 PROCEDURE — 93010 ELECTROCARDIOGRAM REPORT: CPT | Mod: S$GLB,,, | Performed by: INTERNAL MEDICINE

## 2022-10-24 PROCEDURE — 93005 ELECTROCARDIOGRAM TRACING: CPT | Mod: S$GLB,,, | Performed by: INTERNAL MEDICINE

## 2022-10-24 PROCEDURE — 99214 PR OFFICE/OUTPT VISIT, EST, LEVL IV, 30-39 MIN: ICD-10-PCS | Mod: S$GLB,,, | Performed by: INTERNAL MEDICINE

## 2022-10-24 PROCEDURE — 99999 PR PBB SHADOW E&M-EST. PATIENT-LVL V: CPT | Mod: PBBFAC,,, | Performed by: INTERNAL MEDICINE

## 2022-10-24 RX ORDER — NEBIVOLOL 2.5 MG/1
2.5 TABLET ORAL DAILY
Qty: 30 TABLET | Refills: 2 | Status: ON HOLD | OUTPATIENT
Start: 2022-10-24 | End: 2023-01-10 | Stop reason: HOSPADM

## 2022-10-24 NOTE — PROGRESS NOTES
Subjective:    old note in      Patient ID: Nena Franco is a 57 y.o. female.  Gyn: PEPE &BSO    MMG:  May 2021 womens BR hold for this Friday 8/26   Dexa:  Osteoporosis  2018 lumbar -3.3 hips -2.3 //2022 L-3.0 & H -2.8 // Rx boniva   Colonoscopy:   11/22/17  Tub adenoma r/c 5  //Dr Woo   Immunizations: Flu: refused Tdap:  2022 Shingles: defer  Covid: defers    Smoker:  Never     Chief Complaint: Tachycardia and Dizziness    Had 2 more episode in last 2 wks. Last two happened with in seconds of getting up.   Once at night getting up to use RR and fell into wall. Thought maybe due to being mostly asleep.  Then last wk getting up from bed.  Got very dizzy, off balance, pre syncope. No CP, LOC, tunnel vision.  Didn't felt well though the weekend.    (Father had hx of MI & syncope)   Also noticing elevated HR issues.          Had episode in July walking all the sudden land on the ground.  Doesn't remember any dizziness before.       Hx of sustain SVT -  on Holter and pt was unaware happened mostly while sleeping. Tx Digoxin  (3-4 months then stopped) -- Dr Weaver     Loss of Consciousness  This is a recurrent problem. The problem occurs intermittently. There was no loss of consciousness. The symptoms are aggravated by normal activity. Associated symptoms include dizziness, light-headedness and nausea. Pertinent negatives include no chest pain, confusion, diaphoresis, headaches, palpitations or vomiting. She has tried nothing for the symptoms. Her past medical history is significant for arrhythmia and HTN.        Tachycardia:  will start 2.5 Bystolic -   Hypertension: controlled:  Rx Losartan 25 PM, HCTZ 12.5 am  Heart palpitations:  Hx sustained SVT, coronary spasm.  Cardio Dr Weaver // no side effects w B Blockers       Hyperlipidemia:  controlled   Lower back pain and more on left side:  On and off;  Using patch's OTC help.  P.r.n. Flexeril.  Hypothyroid; controlled Rx levo 100. //TPO TB antibody  negative.  Migraines:  Complex hemiplegic.  Currently stable Previously took Botox Dr. Paniagua.  Improved with CBD oil OTC.  (SE: Imitrex: worse)   Vitamin-D deficiency:  Supplement O2 vitamin-D  Metabolic syndrome BMI 36. Weight 188.    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  Rx Valtrex  Allergies/reactive airway disease:  Increased symptoms early spring/October.  Rx daily H1 nasal spray, p.r.n. Advair  Chronic GERD with erosive esophagitis: Rx Protonix 40 daily, Carafate 1 1 daily. Mgmt Dr Woo  GI  Glucose intolerance:  A1c 5.6    Review of Systems:  Review of Systems   Constitutional:  Negative for appetite change and diaphoresis.   HENT:  Negative for nosebleeds.    Eyes:  Negative for pain.   Respiratory:  Positive for cough. Negative for choking.    Cardiovascular:  Positive for syncope. Negative for chest pain and palpitations.   Gastrointestinal:  Positive for nausea. Negative for blood in stool and vomiting.   Genitourinary:  Negative for hematuria.   Musculoskeletal:  Negative for joint swelling.   Skin:  Negative for pallor.   Neurological:  Positive for dizziness and light-headedness. Negative for facial asymmetry and headaches.   Hematological:  Does not bruise/bleed easily.   Psychiatric/Behavioral:  Negative for confusion.      Objective:     Vitals:    10/24/22 1418 10/24/22 1444 10/24/22 1445   BP: (!) 136/94 124/76 116/72   BP Location: Right arm Right arm Right arm   Patient Position: Lying Sitting Standing   Pulse: 103 (!) 115 (!) 117   Weight: 85.3 kg (188 lb 0.8 oz)     Height: 5' (1.524 m)            Physical Exam  Vitals reviewed.   Constitutional:       Appearance: Normal appearance.   HENT:      Head: Normocephalic and atraumatic.      Mouth/Throat:      Pharynx: Oropharynx is clear.   Eyes:      Extraocular Movements: Extraocular movements intact.      Conjunctiva/sclera: Conjunctivae normal.      Pupils: Pupils are equal, round, and reactive to light.    Cardiovascular:      Rate and Rhythm: Regular rhythm. Tachycardia present.      Heart sounds: Normal heart sounds.   Pulmonary:      Effort: Pulmonary effort is normal.      Breath sounds: Normal breath sounds.   Abdominal:      General: Bowel sounds are normal.      Palpations: Abdomen is soft.   Musculoskeletal:         General: Normal range of motion.      Cervical back: Normal range of motion and neck supple.   Skin:     General: Skin is warm and dry.   Neurological:      General: No focal deficit present.      Mental Status: She is alert and oriented to person, place, and time.   Psychiatric:         Mood and Affect: Mood normal.       Medication List with Changes/Refills   New Medications    NEBIVOLOL (BYSTOLIC) 2.5 MG TAB    Take 1 tablet (2.5 mg total) by mouth once daily.   Current Medications    CYCLOBENZAPRINE (FLEXERIL) 10 MG TABLET    Take 10 mg by mouth nightly as needed (migraine headache).     ERGOCALCIFEROL (VITAMIN D2) 50,000 UNIT CAP    Take 6 capsules (300,000 Units total) by mouth every 30 days.    FLUTICASONE PROPIONATE (FLONASE) 50 MCG/ACTUATION NASAL SPRAY    1 spray by Each Nostril route once daily.    FLUTICASONE-SALMETEROL DISKUS INHALER 100-50 MCG    Inhale 1 puff into the lungs 2 (two) times daily. Controller    HYDROCHLOROTHIAZIDE (MICROZIDE) 12.5 MG CAPSULE    Take 1 capsule (12.5 mg total) by mouth once daily.    IBANDRONATE (BONIVA) 150 MG TABLET    Take 150 mg by mouth every 30 days.    KETOROLAC TROMETHAMINE (TORADOL ORAL)        LEVOCETIRIZINE (XYZAL) 5 MG TABLET    TAKE 1 TABLET BY MOUTH EVERY DAY    LEVOTHYROXINE (SYNTHROID) 100 MCG TABLET    TAKE 1 TABLET BY MOUTH EVERY MORNING ON AN EMPTY STOMACH    LORAZEPAM (ATIVAN) 0.5 MG TABLET    Take 1 tablet (0.5 mg total) by mouth daily as needed for Anxiety.    LOSARTAN (COZAAR) 25 MG TABLET    Take 1 tablet (25 mg total) by mouth once daily.    NAPROXEN (EC NAPROSYN) 500 MG EC TABLET    Take 1 tablet (500 mg total) by mouth daily  as needed (migraine headaches).    ONDANSETRON (ZOFRAN-ODT) 4 MG TBDL    Take 1 tablet (4 mg total) by mouth every 6 (six) hours as needed.    PANTOPRAZOLE (PROTONIX) 40 MG TABLET    Take 1 tablet (40 mg total) by mouth every evening.    POTASSIUM CHLORIDE SA (K-DUR,KLOR-CON) 10 MEQ TABLET    TAKE 2 TABLETS BY MOUTH EVERY DAY AS NEEDED FOR CRAMPS    SUCRALFATE (CARAFATE) 1 GRAM TABLET    TAKE 1 TABLET (1 G TOTAL) BY MOUTH 2 (TWO) TIMES DAILY.    VALACYCLOVIR (VALTREX) 1000 MG TABLET    2 tabs po Q 12 hrs x 2 days (repeat as needed cold sore)   Discontinued Medications    MONTELUKAST (SINGULAIR) 10 MG TABLET    TAKE 1 TABLET BY MOUTH EVERY DAY       Assessment & Plan:  1. Pre-syncope    2. Dizziness and giddiness  - IN OFFICE EKG 12-LEAD (to [place])  - Ambulatory referral/consult to Cardiology; Future    3. Tachycardia  - IN OFFICE EKG 12-LEAD (to [place])  - nebivoloL (BYSTOLIC) 2.5 MG Tab; Take 1 tablet (2.5 mg total) by mouth once daily.  Dispense: 30 tablet; Refill: 2    4. Paroxysmal SVT (supraventricular tachycardia)  - Ambulatory referral/consult to Cardiology; Future    5. Essential hypertension     Pre-syncope    Dizziness and giddiness  -     IN OFFICE EKG 12-LEAD (to [place])  -     Ambulatory referral/consult to Cardiology; Future; Expected date: 10/31/2022    Tachycardia  -     IN OFFICE EKG 12-LEAD (to [place])  -     nebivoloL (BYSTOLIC) 2.5 MG Tab; Take 1 tablet (2.5 mg total) by mouth once daily.  Dispense: 30 tablet; Refill: 2    Paroxysmal SVT (supraventricular tachycardia)  -     Ambulatory referral/consult to Cardiology; Future; Expected date: 10/31/2022    Essential hypertension        Continue to work on regular exercise, maintain healthy weight, balanced diet. Avoid unhealthy habits: smoking, excessive alcohol intake.

## 2022-11-08 ENCOUNTER — PATIENT MESSAGE (OUTPATIENT)
Dept: FAMILY MEDICINE | Facility: CLINIC | Age: 57
End: 2022-11-08
Payer: COMMERCIAL

## 2022-12-05 PROBLEM — R55 SYNCOPE AND COLLAPSE: Status: ACTIVE | Noted: 2022-12-05

## 2022-12-07 ENCOUNTER — PATIENT OUTREACH (OUTPATIENT)
Dept: ADMINISTRATIVE | Facility: HOSPITAL | Age: 57
End: 2022-12-07
Payer: COMMERCIAL

## 2022-12-07 NOTE — LETTER
AUTHORIZATION FOR RELEASE OF   CONFIDENTIAL INFORMATION    Dear [facility],    We are seeing Nena Franco, date of birth 1965, in the clinic at [facility]. Patrick Shi MD is the patient's PCP. Nena Franco has an outstanding lab/procedure at the time we reviewed her chart. In order to help keep her health information updated, she has authorized us to request the following medical record(s):        (X)  MAMMOGRAM REPORT                      (  )  COLONOSCOPY      (  )  PAP SMEAR                                          (  )  OUTSIDE LAB RESULTS     (  )  DEXA SCAN                                          (  )  EYE EXAM            (  )  FOOT EXAM                                          (  )  ENTIRE RECORD     (  )  OUTSIDE IMMUNIZATIONS                 (  )  _______________         Please fax records to [facility], Brandon D Simon-Davis, MD, [phone]    If you have any questions, please contact Sedrick Tobin LPN Care Coordinator  at [phone].          Patient Name: Nena Franco  : 1965  Patient Phone #: [phone]

## 2022-12-30 LAB — CRC RECOMMENDATION EXT: NORMAL

## 2023-01-04 ENCOUNTER — PATIENT MESSAGE (OUTPATIENT)
Dept: FAMILY MEDICINE | Facility: CLINIC | Age: 58
End: 2023-01-04
Payer: COMMERCIAL

## 2023-01-04 RX ORDER — KETOROLAC TROMETHAMINE 10 MG/1
10 TABLET, FILM COATED ORAL EVERY 6 HOURS
Qty: 20 TABLET | Refills: 0 | Status: ON HOLD | OUTPATIENT
Start: 2023-01-04 | End: 2023-01-10 | Stop reason: HOSPADM

## 2023-01-04 NOTE — TELEPHONE ENCOUNTER
Please review and advise.   Let me know if an appointment for med refill needed prior to filling.

## 2023-01-09 PROBLEM — I47.10 PSVT (PAROXYSMAL SUPRAVENTRICULAR TACHYCARDIA): Status: ACTIVE | Noted: 2023-01-09

## 2023-01-12 PROBLEM — R14.0 ABDOMINAL DISTENTION: Status: ACTIVE | Noted: 2023-01-12

## 2023-01-12 PROBLEM — R11.10 VOMITING: Status: ACTIVE | Noted: 2023-01-12

## 2023-01-17 ENCOUNTER — PATIENT MESSAGE (OUTPATIENT)
Dept: ADMINISTRATIVE | Facility: HOSPITAL | Age: 58
End: 2023-01-17
Payer: COMMERCIAL

## 2023-01-18 ENCOUNTER — PATIENT OUTREACH (OUTPATIENT)
Dept: ADMINISTRATIVE | Facility: HOSPITAL | Age: 58
End: 2023-01-18
Payer: COMMERCIAL

## 2023-01-18 ENCOUNTER — OFFICE VISIT (OUTPATIENT)
Dept: FAMILY MEDICINE | Facility: CLINIC | Age: 58
End: 2023-01-18
Payer: COMMERCIAL

## 2023-01-18 VITALS
SYSTOLIC BLOOD PRESSURE: 126 MMHG | OXYGEN SATURATION: 96 % | HEIGHT: 60 IN | DIASTOLIC BLOOD PRESSURE: 74 MMHG | HEART RATE: 82 BPM | BODY MASS INDEX: 37.24 KG/M2 | WEIGHT: 189.69 LBS | RESPIRATION RATE: 18 BRPM

## 2023-01-18 DIAGNOSIS — E03.9 HYPOTHYROIDISM, UNSPECIFIED TYPE: ICD-10-CM

## 2023-01-18 DIAGNOSIS — R73.02 GLUCOSE INTOLERANCE (IMPAIRED GLUCOSE TOLERANCE): ICD-10-CM

## 2023-01-18 DIAGNOSIS — E78.5 DYSLIPIDEMIA: ICD-10-CM

## 2023-01-18 DIAGNOSIS — Z09 HOSPITAL DISCHARGE FOLLOW-UP: Primary | ICD-10-CM

## 2023-01-18 DIAGNOSIS — E55.9 VITAMIN D DEFICIENCY: ICD-10-CM

## 2023-01-18 DIAGNOSIS — I10 ESSENTIAL HYPERTENSION: ICD-10-CM

## 2023-01-18 DIAGNOSIS — K56.50 SMALL BOWEL OBSTRUCTION DUE TO ADHESIONS: ICD-10-CM

## 2023-01-18 DIAGNOSIS — Z01.89 ENCOUNTER FOR ROUTINE LABORATORY TESTING: ICD-10-CM

## 2023-01-18 DIAGNOSIS — I47.10 PSVT (PAROXYSMAL SUPRAVENTRICULAR TACHYCARDIA): ICD-10-CM

## 2023-01-18 PROCEDURE — 99214 PR OFFICE/OUTPT VISIT, EST, LEVL IV, 30-39 MIN: ICD-10-PCS | Mod: S$GLB,,, | Performed by: INTERNAL MEDICINE

## 2023-01-18 PROCEDURE — 99999 PR PBB SHADOW E&M-EST. PATIENT-LVL V: ICD-10-PCS | Mod: PBBFAC,,, | Performed by: INTERNAL MEDICINE

## 2023-01-18 PROCEDURE — 99214 OFFICE O/P EST MOD 30 MIN: CPT | Mod: S$GLB,,, | Performed by: INTERNAL MEDICINE

## 2023-01-18 PROCEDURE — 99999 PR PBB SHADOW E&M-EST. PATIENT-LVL V: CPT | Mod: PBBFAC,,, | Performed by: INTERNAL MEDICINE

## 2023-01-18 NOTE — PROGRESS NOTES
Subjective:       Patient ID: Nena Franco is a 57 y.o. female.    Chief Complaint: Hospital Follow Up (Release last Tuesday, 1/10/23, Please review Vitamin D2 dosage)   Hospital follow up   Small bowel obstruction at site of abdominal mesh.     ED with c/o abdominal pain and vomiting. She had an EGD and colonoscopy a week prior to admit with Dr Song. CT abdomen/pelvis showed multiple dilated loops of small bowel most consistent with a small bowel obstruction. She also mentioned 3 syncopal episodes over the past 4 months and a history of Vtach, followed by Dr Weaver. Patient treated with bowel rest, IVF, and worked up by cardiology. Slowly advanced to regular diet and had BM. Medications adjusted per Dr Weaver. Discharged home with RX changes per Dr Weaver's recommendations. Follow up with PCP and Cardiology.     Insurance wont cover loop recorder     While in hospital - had episodes PSVT.  Seen Dr Weaver cardio - getting stress echo today.   Rx Cardizem 120       Gyn: PEPE &BSO    MMG:  womens BR  8/26/22  Dexa:  Osteoporosis  2018 lumbar -3.3 hips -2.3 //2022 L-3.0 & H -2.8 // Rx boniva   Colonoscopy:   11/22/17  Tub adenoma r/c 5  Dr Woo // Dec 30 -no results yet EGD & colonoscopy.   Immunizations: Flu: refused Tdap:  2022 Shingles: defer  Covid: defers  Smoker:  Never         Hypertension: controlled:  Rx Losartan 25 PM, off HCTZ 12.5 am    Heart palpitations:  Hx sustained SVT, coronary spasm.  Cardio Dr Weaver // no side effects w B Blockers     Hx sustain SVT: dx w Holter. Prev Tx Digoxin  (3-4 months then stopped) -cardio Dr Weaver        Hypothyroid; controlled Rx levo 100. //TPO TB antibody negative.  Chronic GERD with erosive esophagitis: Rx Protonix 40 daily, Carafate 1 1 daily. Mgmt Dr Woo  GI  Glucose intolerance:  A1c 5.6    Migraines:  Complex hemiplegic.  Currently stable Previously took Botox Dr. Paniagua.  Improved with CBD oil OTC.  (SE: Imitrex: worse)   Vitamin-D deficiency:  Supplement O2  vitamin-D  Metabolic syndrome BMI 37 & 189.     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  Rx Valtrex  Allergies/reactive airway disease:  Increased symptoms early spring/October.  Rx daily H1 nasal spray, p.r.n. Advair    Lower back pain and more on left side:  On and off;  Using patch's OTC help.  P.r.n. Flexeril.  ____________________________________________________________________________________________________  Assessment & Plan:  1. Hospital discharge follow-up    2. Small bowel obstruction due to adhesions    3. PSVT (paroxysmal supraventricular tachycardia)    4. Essential hypertension    5. Glucose intolerance (impaired glucose tolerance)  - Comprehensive Metabolic Panel; Future  - Hemoglobin A1C; Future  - Comprehensive Metabolic Panel  - Hemoglobin A1C    6. Hypothyroidism, unspecified type  - TSH; Future  - T4, Free; Future  - TSH  - T4, Free    7. Dyslipidemia  - Lipid Panel; Future  - Lipid Panel    8. Encounter for routine laboratory testing  - TSH; Future  - T4, Free; Future  - CBC Without Differential; Future  - Comprehensive Metabolic Panel; Future  - Hemoglobin A1C; Future  - Lipid Panel; Future  - Vitamin D; Future  - TSH  - T4, Free  - CBC Without Differential  - Comprehensive Metabolic Panel  - Hemoglobin A1C  - Lipid Panel  - Vitamin D    9. Vitamin D deficiency  - Vitamin D; Future  - Vitamin D     Hospital discharge follow-up    Small bowel obstruction due to adhesions    PSVT (paroxysmal supraventricular tachycardia)    Essential hypertension    Glucose intolerance (impaired glucose tolerance)  -     Comprehensive Metabolic Panel; Future; Expected date: 01/18/2023  -     Hemoglobin A1C; Future; Expected date: 01/18/2023    Hypothyroidism, unspecified type  -     TSH; Future; Expected date: 01/18/2023  -     T4, Free; Future; Expected date: 01/18/2023    Dyslipidemia  -     Lipid Panel; Future; Expected date: 01/18/2023    Encounter for routine laboratory testing  -      TSH; Future; Expected date: 01/18/2023  -     T4, Free; Future; Expected date: 01/18/2023  -     CBC Without Differential; Future; Expected date: 01/18/2023  -     Comprehensive Metabolic Panel; Future; Expected date: 01/18/2023  -     Hemoglobin A1C; Future; Expected date: 01/18/2023  -     Lipid Panel; Future; Expected date: 01/18/2023  -     Vitamin D; Future; Expected date: 01/18/2023    Vitamin D deficiency  -     Vitamin D; Future; Expected date: 01/18/2023        Continue to work on regular exercise, maintain healthy weight, balanced diet. Avoid unhealthy habits: smoking, excessive alcohol intake.     Disclaimer: This note was partly generated using dictation software which may occasionally result in transcription errors  ____________________________________________________________________________________________________  Review of Systems:  Review of Systems   Constitutional:  Negative for appetite change.   HENT:  Negative for nosebleeds.    Eyes:  Negative for pain.   Respiratory:  Negative for choking.    Cardiovascular:  Negative for chest pain.   Gastrointestinal:  Negative for blood in stool.   Genitourinary:  Negative for hematuria.   Musculoskeletal:  Negative for joint swelling.   Skin:  Negative for pallor.   Neurological:  Negative for facial asymmetry.   Hematological:  Does not bruise/bleed easily.   Psychiatric/Behavioral:  Negative for confusion.      Objective:     Wt Readings from Last 3 Encounters:   01/18/23 86 kg (189 lb 11.3 oz)   01/07/23 86.6 kg (190 lb 14.7 oz)   12/05/22 86.2 kg (190 lb)     BP Readings from Last 3 Encounters:   01/18/23 126/74   01/10/23 120/81   12/05/22 124/74       Lab Results   Component Value Date    WBC 11.18 01/07/2023    HGB 14.1 01/07/2023    HCT 41.8 01/07/2023     01/07/2023     01/07/2023    K 3.6 01/07/2023    CL 99 01/07/2023    ALT 35 01/07/2023    AST 37 (H) 01/07/2023    CO2 33 (H) 01/07/2023    CREATININE 0.77 01/07/2023    BUN 18  01/07/2023     (H) 01/07/2023      Hemoglobin A1C   Date Value Ref Range Status   03/15/2022 5.6 <5.7 % of total Hgb Final     Comment:     For the purpose of screening for the presence of  diabetes:     <5.7%       Consistent with the absence of diabetes  5.7-6.4%    Consistent with increased risk for diabetes              (prediabetes)  > or =6.5%  Consistent with diabetes     This assay result is consistent with a decreased risk  of diabetes.     Currently, no consensus exists regarding use of  hemoglobin A1c for diagnosis of diabetes in children.     According to American Diabetes Association (ADA)  guidelines, hemoglobin A1c <7.0% represents optimal  control in non-pregnant diabetic patients. Different  metrics may apply to specific patient populations.   Standards of Medical Care in Diabetes(ADA).            Lab Results   Component Value Date    TSH 1.53 09/12/2022    TSH 1.73 03/07/2022    TSH 1.39 08/27/2021     Lab Results   Component Value Date    FREET4 1.25 08/09/2010    FREET4 1.27 12/10/2009    FREET4 1.30 01/30/2009     Lab Results   Component Value Date    LDLCALC 111 (H) 03/07/2022    LDLCALC 102 (H) 08/27/2021    LDLCALC 111 (H) 02/26/2021     Lab Results   Component Value Date    TRIG 141 03/07/2022    TRIG 175 (H) 08/27/2021    TRIG 162 (H) 02/26/2021            Physical Exam  Constitutional:       Appearance: Normal appearance.   HENT:      Head: Normocephalic and atraumatic.   Eyes:      Extraocular Movements: Extraocular movements intact.      Pupils: Pupils are equal, round, and reactive to light.   Cardiovascular:      Rate and Rhythm: Normal rate and regular rhythm.   Pulmonary:      Effort: Pulmonary effort is normal.      Breath sounds: Normal breath sounds.   Abdominal:      General: Abdomen is flat. Bowel sounds are normal.      Palpations: Abdomen is soft.   Neurological:      Mental Status: She is alert and oriented to person, place, and time.   Psychiatric:         Mood and  Affect: Mood normal.       Medication List with Changes/Refills   Current Medications    CYCLOBENZAPRINE (FLEXERIL) 10 MG TABLET    Take 10 mg by mouth nightly as needed (migraine headache).     DILTIAZEM (CARDIZEM CD) 120 MG CP24    Take 1 capsule (120 mg total) by mouth every evening.    ERGOCALCIFEROL (ERGOCALCIFEROL) 50,000 UNIT CAP    Take 100,000 Units by mouth every 14 (fourteen) days. Alternating weeks with 50,000 units    ERGOCALCIFEROL (VITAMIN D2) 50,000 UNIT CAP    Take 6 capsules (300,000 Units total) by mouth every 30 days.    FLUTICASONE PROPIONATE (FLONASE) 50 MCG/ACTUATION NASAL SPRAY    1 spray by Each Nostril route daily as needed for Allergies.    IBANDRONATE (BONIVA) 150 MG TABLET    Take 150 mg by mouth every 30 days.    LEVOCETIRIZINE (XYZAL) 5 MG TABLET    TAKE 1 TABLET BY MOUTH EVERY DAY    LEVOTHYROXINE (SYNTHROID) 100 MCG TABLET    TAKE 1 TABLET BY MOUTH EVERY MORNING ON AN EMPTY STOMACH    LORAZEPAM (ATIVAN) 0.5 MG TABLET    Take 1 tablet (0.5 mg total) by mouth daily as needed for Anxiety.    LOSARTAN (COZAAR) 25 MG TABLET    Take 1 tablet (25 mg total) by mouth once daily.    NAPROXEN (EC NAPROSYN) 500 MG EC TABLET    Take 1 tablet (500 mg total) by mouth daily as needed (migraine headaches).    PANTOPRAZOLE (PROTONIX) 40 MG TABLET    Take 1 tablet (40 mg total) by mouth every evening.    POTASSIUM CHLORIDE SA (K-DUR,KLOR-CON) 10 MEQ TABLET    TAKE 2 TABLETS BY MOUTH EVERY DAY AS NEEDED FOR CRAMPS    SUCRALFATE (CARAFATE) 1 GRAM TABLET    TAKE 1 TABLET (1 G TOTAL) BY MOUTH 2 (TWO) TIMES DAILY.    VALACYCLOVIR (VALTREX) 1000 MG TABLET    Take 2 tablets (2,000 mg total) by mouth every 12 (twelve) hours as needed (for 2 days (repeat as needed for cold sore).

## 2023-01-18 NOTE — LETTER
AUTHORIZATION FOR RELEASE OF   CONFIDENTIAL INFORMATION    Dear Emory Song MD,    We are seeing Nena Franco, date of birth 1965, in the clinic at [facility]. Patrick Shi MD is the patient's PCP. Nena Franco has an outstanding lab/procedure at the time we reviewed her chart. In order to help keep her health information updated, she has authorized us to request the following medical record(s):        (  )  MAMMOGRAM                                      (X) EGD/COLON W PATH & RECALL      (  )  PAP SMEAR                                          (  )  OUTSIDE LAB RESULTS     (  )  DEXA SCAN                                          (  )  EYE EXAM            (  )  FOOT EXAM                                          (  )  ENTIRE RECORD     (  )  OUTSIDE IMMUNIZATIONS                 (  )  _______________         Please fax records to [facility], Brandon D Simon-Davis, MD, [phone]    If you have any questions, please contact Sedrick Tobin LPN Care Coordinator  at [phone].            Patient Name: Nena Franco  : 1965  Patient Phone #: [phone]

## 2023-02-01 ENCOUNTER — PATIENT OUTREACH (OUTPATIENT)
Dept: ADMINISTRATIVE | Facility: HOSPITAL | Age: 58
End: 2023-02-01
Payer: COMMERCIAL

## 2023-03-13 ENCOUNTER — PATIENT OUTREACH (OUTPATIENT)
Dept: ADMINISTRATIVE | Facility: HOSPITAL | Age: 58
End: 2023-03-13
Payer: COMMERCIAL

## 2023-05-08 ENCOUNTER — TELEPHONE (OUTPATIENT)
Dept: FAMILY MEDICINE | Facility: CLINIC | Age: 58
End: 2023-05-08
Payer: COMMERCIAL

## 2023-05-08 ENCOUNTER — PATIENT MESSAGE (OUTPATIENT)
Dept: FAMILY MEDICINE | Facility: CLINIC | Age: 58
End: 2023-05-08
Payer: COMMERCIAL

## 2023-05-08 NOTE — TELEPHONE ENCOUNTER
----- Message from Haydee Carroll sent at 5/8/2023  8:23 AM CDT -----  Regarding: LAB ORDERS  Contact: LONNIE JOHNSTON [phone]  Type:  LAB ORDERS     Patient is requesting lab orders to be sent to [facility] for appointment.  Please fax order and contact patient to schedule.    Name of Caller:  LONNIE JOHNSTON    Where would they like the lab performed?   [email or web address]     Best Call Back Number:  [phone]    Additional Information: Patient is requesting lab orders to be sent to [facility]  Please call back and advise. Thanks

## 2023-05-08 NOTE — TELEPHONE ENCOUNTER
Order faxed to [facility]. Tried to reach pt. No answer. Left message for pt to check w/ Jamshid and call back if any questions.

## 2023-05-09 LAB
25(OH)D3 SERPL-MCNC: 65 NG/ML (ref 30–100)
ALBUMIN SERPL-MCNC: 4.2 G/DL (ref 3.6–5.1)
ALBUMIN/GLOB SERPL: 1.6 (CALC) (ref 1–2.5)
ALP SERPL-CCNC: 87 U/L (ref 37–153)
ALT SERPL-CCNC: 17 U/L (ref 6–29)
AST SERPL-CCNC: 16 U/L (ref 10–35)
BILIRUB SERPL-MCNC: 0.4 MG/DL (ref 0.2–1.2)
BUN SERPL-MCNC: 15 MG/DL (ref 7–25)
BUN/CREAT SERPL: NORMAL (CALC) (ref 6–22)
CALCIUM SERPL-MCNC: 9.6 MG/DL (ref 8.6–10.4)
CHLORIDE SERPL-SCNC: 105 MMOL/L (ref 98–110)
CHOLEST SERPL-MCNC: 198 MG/DL
CHOLEST/HDLC SERPL: 2.9 (CALC)
CO2 SERPL-SCNC: 29 MMOL/L (ref 20–32)
CREAT SERPL-MCNC: 0.62 MG/DL (ref 0.5–1.03)
EGFR: 103 ML/MIN/1.73M2
ERYTHROCYTE [DISTWIDTH] IN BLOOD BY AUTOMATED COUNT: 13 % (ref 11–15)
GLOBULIN SER CALC-MCNC: 2.6 G/DL (CALC) (ref 1.9–3.7)
GLUCOSE SERPL-MCNC: 94 MG/DL (ref 65–99)
HBA1C MFR BLD: 5.4 % OF TOTAL HGB
HCT VFR BLD AUTO: 39.3 % (ref 35–45)
HDLC SERPL-MCNC: 68 MG/DL
HGB BLD-MCNC: 13.4 G/DL (ref 11.7–15.5)
LDLC SERPL CALC-MCNC: 109 MG/DL (CALC)
MCH RBC QN AUTO: 30.7 PG (ref 27–33)
MCHC RBC AUTO-ENTMCNC: 34.1 G/DL (ref 32–36)
MCV RBC AUTO: 89.9 FL (ref 80–100)
NONHDLC SERPL-MCNC: 130 MG/DL (CALC)
PLATELET # BLD AUTO: 368 THOUSAND/UL (ref 140–400)
PMV BLD REES-ECKER: 9.5 FL (ref 7.5–12.5)
POTASSIUM SERPL-SCNC: 4.3 MMOL/L (ref 3.5–5.3)
PROT SERPL-MCNC: 6.8 G/DL (ref 6.1–8.1)
RBC # BLD AUTO: 4.37 MILLION/UL (ref 3.8–5.1)
SODIUM SERPL-SCNC: 143 MMOL/L (ref 135–146)
T4 FREE SERPL-MCNC: 1.5 NG/DL (ref 0.8–1.8)
TRIGL SERPL-MCNC: 107 MG/DL
TSH SERPL-ACNC: 1.2 MIU/L (ref 0.4–4.5)
WBC # BLD AUTO: 7.1 THOUSAND/UL (ref 3.8–10.8)

## 2023-05-15 ENCOUNTER — HOSPITAL ENCOUNTER (OUTPATIENT)
Dept: RADIOLOGY | Facility: HOSPITAL | Age: 58
Discharge: HOME | End: 2023-05-15
Attending: INTERNAL MEDICINE
Payer: COMMERCIAL

## 2023-05-15 ENCOUNTER — OFFICE VISIT (OUTPATIENT)
Dept: FAMILY MEDICINE | Facility: CLINIC | Age: 58
End: 2023-05-15
Payer: COMMERCIAL

## 2023-05-15 VITALS
BODY MASS INDEX: 36.53 KG/M2 | RESPIRATION RATE: 16 BRPM | OXYGEN SATURATION: 98 % | WEIGHT: 186.06 LBS | SYSTOLIC BLOOD PRESSURE: 120 MMHG | HEIGHT: 60 IN | DIASTOLIC BLOOD PRESSURE: 84 MMHG | HEART RATE: 63 BPM

## 2023-05-15 DIAGNOSIS — E87.6 LOW BLOOD POTASSIUM: ICD-10-CM

## 2023-05-15 DIAGNOSIS — M25.512 CHRONIC LEFT SHOULDER PAIN: ICD-10-CM

## 2023-05-15 DIAGNOSIS — E03.9 HYPOTHYROIDISM, UNSPECIFIED TYPE: ICD-10-CM

## 2023-05-15 DIAGNOSIS — G89.29 CHRONIC LEFT SHOULDER PAIN: Primary | ICD-10-CM

## 2023-05-15 DIAGNOSIS — Z00.00 WELLNESS EXAMINATION: ICD-10-CM

## 2023-05-15 DIAGNOSIS — F41.9 ANXIETY: ICD-10-CM

## 2023-05-15 DIAGNOSIS — E78.5 DYSLIPIDEMIA: ICD-10-CM

## 2023-05-15 DIAGNOSIS — G89.29 CHRONIC LEFT SHOULDER PAIN: ICD-10-CM

## 2023-05-15 DIAGNOSIS — R44.2 OLFACTORY HALLUCINATION: ICD-10-CM

## 2023-05-15 DIAGNOSIS — R73.02 GLUCOSE INTOLERANCE (IMPAIRED GLUCOSE TOLERANCE): ICD-10-CM

## 2023-05-15 DIAGNOSIS — I10 ESSENTIAL HYPERTENSION: ICD-10-CM

## 2023-05-15 DIAGNOSIS — M25.512 CHRONIC LEFT SHOULDER PAIN: Primary | ICD-10-CM

## 2023-05-15 PROCEDURE — 99999 PR PBB SHADOW E&M-EST. PATIENT-LVL V: ICD-10-PCS | Mod: PBBFAC,,, | Performed by: INTERNAL MEDICINE

## 2023-05-15 PROCEDURE — 73030 X-RAY EXAM OF SHOULDER: CPT | Mod: 26,LT,, | Performed by: RADIOLOGY

## 2023-05-15 PROCEDURE — 73030 X-RAY EXAM OF SHOULDER: CPT | Mod: TC,PN,LT

## 2023-05-15 PROCEDURE — 99999 PR PBB SHADOW E&M-EST. PATIENT-LVL V: CPT | Mod: PBBFAC,,, | Performed by: INTERNAL MEDICINE

## 2023-05-15 PROCEDURE — 99214 PR OFFICE/OUTPT VISIT, EST, LEVL IV, 30-39 MIN: ICD-10-PCS | Mod: S$GLB,,, | Performed by: INTERNAL MEDICINE

## 2023-05-15 PROCEDURE — 99214 OFFICE O/P EST MOD 30 MIN: CPT | Mod: S$GLB,,, | Performed by: INTERNAL MEDICINE

## 2023-05-15 PROCEDURE — 73030 XR SHOULDER COMPLETE 2 OR MORE VIEWS LEFT: ICD-10-PCS | Mod: 26,LT,, | Performed by: RADIOLOGY

## 2023-05-15 RX ORDER — LORAZEPAM 0.5 MG/1
0.5 TABLET ORAL DAILY PRN
Qty: 30 TABLET | Refills: 0 | Status: SHIPPED | OUTPATIENT
Start: 2023-05-15 | End: 2023-06-28 | Stop reason: SDUPTHER

## 2023-05-15 RX ORDER — LEVOTHYROXINE SODIUM 100 UG/1
TABLET ORAL
Qty: 90 TABLET | Refills: 3 | Status: SHIPPED | OUTPATIENT
Start: 2023-05-15

## 2023-05-15 RX ORDER — POTASSIUM CHLORIDE 750 MG/1
10 TABLET, EXTENDED RELEASE ORAL DAILY
Qty: 90 TABLET | Refills: 2 | Status: SHIPPED | OUTPATIENT
Start: 2023-05-15 | End: 2024-02-05

## 2023-05-15 RX ORDER — POTASSIUM CHLORIDE 750 MG/1
TABLET, EXTENDED RELEASE ORAL
Qty: 180 TABLET | Refills: 2 | Status: SHIPPED | OUTPATIENT
Start: 2023-05-15 | End: 2023-05-15

## 2023-05-15 RX ORDER — KETOROLAC TROMETHAMINE 10 MG/1
10 TABLET, FILM COATED ORAL EVERY 6 HOURS PRN
COMMUNITY
End: 2023-06-29 | Stop reason: SDUPTHER

## 2023-05-15 NOTE — PROGRESS NOTES
Subjective:       Patient ID: Nena Franco is a 58 y.o. female.    Chief Complaint: Follow-up   Having abnormal smells - 5 x in last 2 wks.  Smelling burnt pop corn, smoke.  Like she has sinus infection or congestion.  Taking H1 daily.     Left shoulder soreness - mild weakness w lifting lifting arm out to the side, pulling down shirt.  Some pain w sleeping on shoulder. 3 - 4 months. Never seen Ortho - no trauma.      Gyn: PEPE &BSO    MMG:  womens BR  8/26/22  Dexa:  Osteoporosis  2018 lumbar -3.3 hips -2.3 //2022 L-3.0 & H -2.8 // Rx boniva   Colonoscopy:   11/22/17  Tub adenoma r/c 5  Dr Woo // Dec 30 -no results yet EGD & colonoscopy.   Immunizations: Flu: refused Tdap:  2022 Shingles: defer  Covid: defers   Smoker:  Never   Note N           Hypertension: controlled:  Rx Cardizem 240 increased to control BP   (off HCTZ 12.5, Losartan      Heart palpitations:  Hx sustained SVT, coronary spasm.  Cardio Dr Weaver // Rx Cardizem 240                Hx sustain SVT: dx w Holter. Prev Tx Digoxin  (3-4 months then stopped) -cardio Dr Weaver    Insurance wont cover loop recorder.          Hypothyroid; controlled Rx levo 100. //TPO TB antibody negative.    Chronic GERD w erosive esophagitis: Rx Protonix 40 daily, Carafate 1 1 daily. Mgmt Dr Woo  GI    Glucose intolerance:  A1c 5.6, 5.4      Migraines:  Complex hemiplegic.  Currently stable Previously took Botox Dr. Paniagua.  Improved with CBD oil OTC.  (SE: Imitrex: worse)     Vitamin-D deficiency:  Supplement O2 vitamin-D    Metabolic syndrome BMI 37 & 189.       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  Rx Valtrex    Allergies/reactive airway disease:  + symptoms early spring/October.  Rx daily H1 nasal spray, p.r.n. Advair     Lower back pain and more on left side:  On and off;  Using patch's OTC help.  P.r.n. Flexeril.  ____________________________________________________________________________________________________  Assessment & Plan:  1. Chronic  left shoulder pain  - X-Ray Shoulder 2 or More Views Left; Future  - Ambulatory referral/consult to Physical/Occupational Therapy; Future    2. Anxiety  - LORazepam (ATIVAN) 0.5 MG tablet; Take 1 tablet (0.5 mg total) by mouth daily as needed for Anxiety.  Dispense: 30 tablet; Refill: 0    3. Olfactory hallucination    4. Essential hypertension    5. Glucose intolerance (impaired glucose tolerance)  - Comprehensive Metabolic Panel; Future  - Hemoglobin A1C; Future  - Comprehensive Metabolic Panel  - Hemoglobin A1C    6. Hypothyroidism, unspecified type  - TSH; Future  - T4, Free; Future  - TSH  - T4, Free    7. Dyslipidemia    8. Low blood potassium  - potassium chloride SA (K-DUR,KLOR-CON M) 10 MEQ tablet; Take 1 tablet (10 mEq total) by mouth once daily.  Dispense: 90 tablet; Refill: 2    9. Wellness examination  - Comprehensive Metabolic Panel; Future  - Hemoglobin A1C; Future  - TSH; Future  - T4, Free; Future  - Comprehensive Metabolic Panel  - Hemoglobin A1C  - TSH  - T4, Free     Chronic left shoulder pain  Comments:  xray today and PT if not better consider MRI.  ortho eval   Orders:  -     X-Ray Shoulder 2 or More Views Left; Future; Expected date: 05/15/2023  -     Ambulatory referral/consult to Physical/Occupational Therapy; Future; Expected date: 05/22/2023    Anxiety  -     LORazepam (ATIVAN) 0.5 MG tablet; Take 1 tablet (0.5 mg total) by mouth daily as needed for Anxiety.  Dispense: 30 tablet; Refill: 0    Olfactory hallucination  Comments:  add flonase and saline wash for next wk.     Essential hypertension    Glucose intolerance (impaired glucose tolerance)  -     Comprehensive Metabolic Panel; Future; Expected date: 05/15/2023  -     Hemoglobin A1C; Future; Expected date: 05/15/2023    Hypothyroidism, unspecified type  -     TSH; Future; Expected date: 05/15/2023  -     T4, Free; Future; Expected date: 05/15/2023    Dyslipidemia    Low blood potassium  -     potassium chloride SA (K-DUR,KLOR-CON M)  10 MEQ tablet; Take 1 tablet (10 mEq total) by mouth once daily.  Dispense: 90 tablet; Refill: 2    Wellness examination  -     Comprehensive Metabolic Panel; Future; Expected date: 05/15/2023  -     Hemoglobin A1C; Future; Expected date: 05/15/2023  -     TSH; Future; Expected date: 05/15/2023  -     T4, Free; Future; Expected date: 05/15/2023    Other orders  -     Discontinue: potassium chloride SA (K-DUR,KLOR-CON M) 10 MEQ tablet; TAKE 2 TABLETS BY MOUTH EVERY DAY AS NEEDED FOR CRAMPS  Strength: 10 mEq  Dispense: 180 tablet; Refill: 2        Continue to work on regular exercise, maintain healthy weight, balanced diet. Avoid unhealthy habits: smoking, excessive alcohol intake.     Disclaimer: This note was partly generated using dictation software which may occasionally result in transcription errors  ____________________________________________________________________________________________________  Review of Systems:  Review of Systems   Constitutional:  Negative for appetite change.   HENT:  Negative for nosebleeds.    Eyes:  Negative for pain.   Respiratory:  Negative for choking.    Gastrointestinal:  Negative for blood in stool.   Genitourinary:  Negative for hematuria.   Skin:  Negative for pallor.   Neurological:  Negative for facial asymmetry.   Hematological:  Does not bruise/bleed easily.   Psychiatric/Behavioral:  Negative for confusion.      Objective:     Wt Readings from Last 3 Encounters:   05/15/23 84.4 kg (186 lb 1.1 oz)   03/06/23 85 kg (187 lb 7.5 oz)   01/18/23 85.7 kg (189 lb)     BP Readings from Last 3 Encounters:   05/15/23 120/84   03/06/23 (!) 128/90   01/18/23 126/74       Lab Results   Component Value Date    WBC 7.1 05/08/2023    HGB 13.4 05/08/2023    HCT 39.3 05/08/2023     05/08/2023     05/08/2023    K 4.3 05/08/2023     05/08/2023    ALT 17 05/08/2023    AST 16 05/08/2023    CO2 29 05/08/2023    CREATININE 0.62 05/08/2023    BUN 15 05/08/2023    GLU 94  05/08/2023      Hemoglobin A1C   Date Value Ref Range Status   05/08/2023 5.4 <5.7 % of total Hgb Final     Comment:     For the purpose of screening for the presence of  diabetes:     <5.7%       Consistent with the absence of diabetes  5.7-6.4%    Consistent with increased risk for diabetes              (prediabetes)  > or =6.5%  Consistent with diabetes     This assay result is consistent with a decreased risk  of diabetes.     Currently, no consensus exists regarding use of  hemoglobin A1c for diagnosis of diabetes in children.     According to American Diabetes Association (ADA)  guidelines, hemoglobin A1c <7.0% represents optimal  control in non-pregnant diabetic patients. Different  metrics may apply to specific patient populations.   Standards of Medical Care in Diabetes(ADA).         03/15/2022 5.6 <5.7 % of total Hgb Final     Comment:     For the purpose of screening for the presence of  diabetes:     <5.7%       Consistent with the absence of diabetes  5.7-6.4%    Consistent with increased risk for diabetes              (prediabetes)  > or =6.5%  Consistent with diabetes     This assay result is consistent with a decreased risk  of diabetes.     Currently, no consensus exists regarding use of  hemoglobin A1c for diagnosis of diabetes in children.     According to American Diabetes Association (ADA)  guidelines, hemoglobin A1c <7.0% represents optimal  control in non-pregnant diabetic patients. Different  metrics may apply to specific patient populations.   Standards of Medical Care in Diabetes(ADA).            Lab Results   Component Value Date    TSH 1.20 05/08/2023    TSH 1.53 09/12/2022    TSH 1.73 03/07/2022     Lab Results   Component Value Date    FREET4 1.25 08/09/2010    FREET4 1.27 12/10/2009    FREET4 1.30 01/30/2009     Lab Results   Component Value Date    LDLCALC 109 (H) 05/08/2023    LDLCALC 111 (H) 03/07/2022    LDLCALC 102 (H) 08/27/2021     Lab Results   Component Value Date    TRIG 107  05/08/2023    TRIG 141 03/07/2022    TRIG 175 (H) 08/27/2021            Physical Exam  Constitutional:       Appearance: Normal appearance.   HENT:      Head: Normocephalic and atraumatic.   Eyes:      Extraocular Movements: Extraocular movements intact.      Pupils: Pupils are equal, round, and reactive to light.   Cardiovascular:      Rate and Rhythm: Normal rate.   Pulmonary:      Effort: Pulmonary effort is normal.   Musculoskeletal:      Left shoulder: No swelling or tenderness. Decreased range of motion.      Comments: Reduced range of motion abduction left shoulder at 90 degree.  Tightness left shoulder internal external rotation.    Neurological:      Mental Status: She is alert and oriented to person, place, and time.   Psychiatric:         Mood and Affect: Mood normal.       Medication List with Changes/Refills   Current Medications    CYCLOBENZAPRINE (FLEXERIL) 10 MG TABLET    Take 10 mg by mouth nightly as needed (migraine headache).     DILTIAZEM (CARDIZEM CD) 240 MG 24 HR CAPSULE    Take 1 capsule (240 mg total) by mouth once daily.    ERGOCALCIFEROL (ERGOCALCIFEROL) 50,000 UNIT CAP    Take 100,000 Units by mouth every 14 (fourteen) days. Alternating weeks with 50,000 units    FLUTICASONE PROPIONATE (FLONASE) 50 MCG/ACTUATION NASAL SPRAY    1 spray by Each Nostril route daily as needed for Allergies.    IBANDRONATE (BONIVA) 150 MG TABLET    Take 150 mg by mouth every 30 days.    KETOROLAC (TORADOL) 10 MG TABLET    Take 10 mg by mouth every 6 (six) hours as needed (headaches).    LEVOCETIRIZINE (XYZAL) 5 MG TABLET    TAKE 1 TABLET BY MOUTH EVERY DAY    LEVOTHYROXINE (SYNTHROID) 100 MCG TABLET    TAKE 1 TABLET BY MOUTH EVERY MORNING ON AN EMPTY STOMACH    NAPROXEN (EC NAPROSYN) 500 MG EC TABLET    Take 1 tablet (500 mg total) by mouth daily as needed (migraine headaches).    PANTOPRAZOLE (PROTONIX) 40 MG TABLET    Take 1 tablet (40 mg total) by mouth every evening.    SUCRALFATE (CARAFATE) 1 GRAM TABLET     TAKE 1 TABLET (1 G TOTAL) BY MOUTH 2 (TWO) TIMES DAILY.    VALACYCLOVIR (VALTREX) 1000 MG TABLET    Take 2 tablets (2,000 mg total) by mouth every 12 (twelve) hours as needed (for 2 days (repeat as needed for cold sore).   Changed and/or Refilled Medications    Modified Medication Previous Medication    LORAZEPAM (ATIVAN) 0.5 MG TABLET LORazepam (ATIVAN) 0.5 MG tablet       Take 1 tablet (0.5 mg total) by mouth daily as needed for Anxiety.    Take 1 tablet (0.5 mg total) by mouth daily as needed for Anxiety.    POTASSIUM CHLORIDE SA (K-DUR,KLOR-CON M) 10 MEQ TABLET potassium chloride SA (K-DUR,KLOR-CON) 10 MEQ tablet       Take 1 tablet (10 mEq total) by mouth once daily.    TAKE 2 TABLETS BY MOUTH EVERY DAY AS NEEDED FOR CRAMPS   Discontinued Medications    ERGOCALCIFEROL (VITAMIN D2) 50,000 UNIT CAP    Take 6 capsules (300,000 Units total) by mouth every 30 days.

## 2023-05-16 ENCOUNTER — CLINICAL SUPPORT (OUTPATIENT)
Dept: REHABILITATION | Facility: HOSPITAL | Age: 58
End: 2023-05-16
Payer: COMMERCIAL

## 2023-05-16 DIAGNOSIS — M25.612 DECREASED ROM OF LEFT SHOULDER: ICD-10-CM

## 2023-05-16 DIAGNOSIS — M25.512 CHRONIC LEFT SHOULDER PAIN: ICD-10-CM

## 2023-05-16 DIAGNOSIS — R29.898 WEAKNESS OF LEFT UPPER EXTREMITY: ICD-10-CM

## 2023-05-16 DIAGNOSIS — G89.29 CHRONIC LEFT SHOULDER PAIN: ICD-10-CM

## 2023-05-16 PROCEDURE — 97110 THERAPEUTIC EXERCISES: CPT | Mod: PN

## 2023-05-16 PROCEDURE — 97161 PT EVAL LOW COMPLEX 20 MIN: CPT | Mod: PN

## 2023-05-22 ENCOUNTER — CLINICAL SUPPORT (OUTPATIENT)
Dept: REHABILITATION | Facility: HOSPITAL | Age: 58
End: 2023-05-22
Payer: COMMERCIAL

## 2023-05-22 DIAGNOSIS — M25.612 DECREASED ROM OF LEFT SHOULDER: Primary | ICD-10-CM

## 2023-05-22 DIAGNOSIS — R29.898 WEAKNESS OF LEFT UPPER EXTREMITY: ICD-10-CM

## 2023-05-22 PROCEDURE — 97110 THERAPEUTIC EXERCISES: CPT | Mod: PN

## 2023-05-22 PROCEDURE — 97530 THERAPEUTIC ACTIVITIES: CPT | Mod: PN

## 2023-05-22 PROCEDURE — 97112 NEUROMUSCULAR REEDUCATION: CPT | Mod: PN

## 2023-05-22 NOTE — PROGRESS NOTES
[facility]   Physical Therapy Treatment Note      Name: Nena KNOX [facility] Number: [id number]    Therapy Diagnosis:   Encounter Diagnoses   Name Primary?    Decreased ROM of left shoulder Yes    Weakness of left upper extremity      Physician: Patrick Shi*    Visit Date: 5/22/2023    Physician Orders: PT Eval and Treat  Medical Diagnosis from Referral: Chronic left shoulder pain [M25.512, G89.29]  Evaluation Date: 5/16/2023  Authorization Period Expiration: 5/14/2023  Plan of Care Expiration: 7/11/2023  Progress Note Due: 6/16/2023  Visit # / Visits authorized: 1/20 auth (1/1 auth)   FOTO: 1/ 3      Precautions: Standard, HTN    PTA Visit #: 0/5     Time In: 0915 am  Time Out: 0953 am  Total Billable Time: 38 minutes    Subjective     Pt reports: no significant change since initial evaluation visit.     She was compliant with home exercise program.  Response to previous treatment: initial evaluation  Functional change: TBD    Pain: 2/10  Location: left shoulder    Objective      Objective Measures updated at progress report unless specified.     Treatment     Georgina received the treatments listed below:      therapeutic exercises to develop strength, endurance, ROM, flexibility, posture, and core stabilization for (10) minutes including:  UBE forward back 2 min each  Ball flexion stretch 2 min  Dowel ER 2 min    neuromuscular re-education activities to improve: Coordination, Kinesthetic, Sense, Proprioception, and Posture for (18) minutes. The following activities were included:  Resisted rows 3x10 reps  Resisted ER/IR 2x10 reps each towel roll RTB  Prone row 2x10 reps 3#  Prone extension 2x10 reps 3#  Side lying ER 2x10 reps 3#    therapeutic activities to improve functional performance for (10)  minutes, including:  Pulleys flex 2 min  Pulleys abd 2 min  Wall slides with towel B x20 reps    *A portion of this treatment session is provided with the assistance of a skilled  rehbailitation technician under the supervision of a licensed physical therapist.    Patient Education and Home Exercises       Education provided:   - HEP provided and reviewed  - Anatomy and Physiology pertaining to current condition  - Possible response to exercise  - Importance of PT compliance    Written Home Exercises Provided: Patient instructed to cont prior HEP. Exercises were reviewed and Georgina was able to demonstrate them prior to the end of the session.  Georgina demonstrated good  understanding of the education provided. See EMR under Patient Instructions for exercises provided during therapy sessions    Assessment     Pt tolerates all prescribed activities today without significant exacerbation of left shoulder discomfort. Emphasis of treatment today is shoulder strengthening, postural correction, and pain free ROM. We will monitor response to treatment and progress as necessary.     Georgina Is progressing well towards her goals.   Pt prognosis is Good.     Pt will continue to benefit from skilled outpatient physical therapy to address the deficits listed in the problem list box on initial evaluation, provide pt/family education and to maximize pt's level of independence in the home and community environment.     Pt's spiritual, cultural and educational needs considered and pt agreeable to plan of care and goals.     Anticipated barriers to physical therapy: work schedule    Goals:  Short Term Goals: In 4 weeks   1.Pt to be educated on HEP. (met)  2.Patient to increase GH ROM from by 5-10 degrees in limited planes to improve functional mobility. (progressing, not met)  3.Patient to increase strength by 1/2 grade to improve functional tasks. (progressing, not met)  4.Patient to have pain less than 4/10 at all times to improve quality of movement. (progressing, not met)  5.Patient to improve score on the FOTO by 5%. (progressing, not met)  6. Pt to be educated on postural and body mechanics awareness.  (progressing, not met)     Long Term Goals: In 8 weeks  1. Patient to improve score on the FOTO limitation to 34% or less. (progressing, not met)  2. Patient to demo increase in UE strength to 5/5 to improve functional tasks. (progressing, not met)  3. Patient to have decreased pain to 2/10 or less with activities to improve quality of movement. (progressing, not met)  4. Patient to demo increase GH ROM to WNL to improve functional mobility. (progressing, not met)  5. Patient to perform daily activities including household duties, reaching, lifting, recreational activities without limitation. (progressing, not met)    Plan     Continue with PT POC to address functional deficits.     Sudhir Hernandez, PT DPT

## 2023-05-27 DIAGNOSIS — I10 ESSENTIAL HYPERTENSION: ICD-10-CM

## 2023-05-27 NOTE — TELEPHONE ENCOUNTER
No care due was identified.  [facility] Embedded Care Due Messages. Reference number: [id number].   5/27/2023 3:54:08 PM CDT

## 2023-05-29 ENCOUNTER — CLINICAL SUPPORT (OUTPATIENT)
Dept: REHABILITATION | Facility: HOSPITAL | Age: 58
End: 2023-05-29
Payer: COMMERCIAL

## 2023-05-29 DIAGNOSIS — R29.898 WEAKNESS OF LEFT UPPER EXTREMITY: ICD-10-CM

## 2023-05-29 DIAGNOSIS — M25.612 DECREASED ROM OF LEFT SHOULDER: Primary | ICD-10-CM

## 2023-05-29 PROCEDURE — 97110 THERAPEUTIC EXERCISES: CPT | Mod: PN

## 2023-05-29 PROCEDURE — 97112 NEUROMUSCULAR REEDUCATION: CPT | Mod: PN

## 2023-05-29 PROCEDURE — 97014 ELECTRIC STIMULATION THERAPY: CPT | Mod: PN

## 2023-05-29 PROCEDURE — 97530 THERAPEUTIC ACTIVITIES: CPT | Mod: PN

## 2023-05-29 RX ORDER — HYDROCHLOROTHIAZIDE 12.5 MG/1
CAPSULE ORAL
Qty: 90 CAPSULE | Refills: 3 | OUTPATIENT
Start: 2023-05-29

## 2023-05-29 NOTE — PROGRESS NOTES
[facility]   Physical Therapy Treatment Note      Name: Nena Franco  [facility] Number: [id number]    Therapy Diagnosis:   Encounter Diagnoses   Name Primary?    Decreased ROM of left shoulder Yes    Weakness of left upper extremity      Physician: Patrick Shi*    Visit Date: 5/29/2023    Physician Orders: PT Eval and Treat  Medical Diagnosis from Referral: Chronic left shoulder pain [M25.512, G89.29]  Evaluation Date: 5/16/2023  Authorization Period Expiration: 5/14/2023  Plan of Care Expiration: 7/11/2023  Progress Note Due: 6/16/2023  Visit # / Visits authorized: 2/20 auth (1/1 auth)   FOTO: 1/ 3      Precautions: Standard, HTN    PTA Visit #: 0/5     Time In: 1045 am  Time Out: 1130 am  Total Billable Time: 45 minutes    Subjective     Pt reports: she was sore with slight increase in discomfort after last session.    She was compliant with home exercise program.  Response to previous treatment: soreness  Functional change: in progress    Pain: 2/10  Location: left shoulder    Objective      Objective Measures updated at progress report unless specified.     Treatment     Georgina received the treatments listed below:      therapeutic exercises to develop strength, endurance, ROM, flexibility, posture, and core stabilization for (10) minutes including:  UBE forward back 2 min each  Ball flexion stretch 2 min  Dowel ER supine 2 min    neuromuscular re-education activities to improve: Coordination, Kinesthetic, Sense, Proprioception, and Posture for (20) minutes. The following activities were included:  Resisted rows 3x10 reps  Resisted ER/IR 2x10 reps each towel roll RTB  Prone row 2x10 reps 3#  Prone extension 2x10 reps 3#  Side lying ER 2x10 reps 3#    therapeutic activities to improve functional performance for (10)  minutes, including:  Pulleys flex 2 min  Pulleys abd 2 min  Wall slides with towel B x20 reps    Georgina received the following supervised modalities after being  cleared for contradictions: IFC Electrical Stimulation:  Nena received IFC Electrical Stimulation for pain control applied to the left shoulder. Pt received stimulation set to pt tolerance for (5) minutes. Nena tolderated treatment well without any adverse effects.      Georgina received hot pack for (5) minutes to left shoulder.    Patient Education and Home Exercises       Education provided:   - HEP provided and reviewed  - Anatomy and Physiology pertaining to current condition  - Possible response to exercise  - Importance of PT compliance    Written Home Exercises Provided: Patient instructed to cont prior HEP. Exercises were reviewed and Georgina was able to demonstrate them prior to the end of the session.  Georgina demonstrated good  understanding of the education provided. See EMR under Patient Instructions for exercises provided during therapy sessions    Assessment     Symptoms are much better when leaving session today. Emphasis remains on increasing strength, stability, and pain free range.    Georgina Is progressing well towards her goals.   Pt prognosis is Good.     Pt will continue to benefit from skilled outpatient physical therapy to address the deficits listed in the problem list box on initial evaluation, provide pt/family education and to maximize pt's level of independence in the home and community environment.     Pt's spiritual, cultural and educational needs considered and pt agreeable to plan of care and goals.     Anticipated barriers to physical therapy: work schedule    Goals:  Short Term Goals: In 4 weeks   1.Pt to be educated on HEP. (met)  2.Patient to increase GH ROM from by 5-10 degrees in limited planes to improve functional mobility. (progressing, not met)  3.Patient to increase strength by 1/2 grade to improve functional tasks. (progressing, not met)  4.Patient to have pain less than 4/10 at all times to improve quality of movement. (progressing, not met)  5.Patient to improve score  on the FOTO by 5%. (progressing, not met)  6. Pt to be educated on postural and body mechanics awareness. (progressing, not met)     Long Term Goals: In 8 weeks  1. Patient to improve score on the FOTO limitation to 34% or less. (progressing, not met)  2. Patient to demo increase in UE strength to 5/5 to improve functional tasks. (progressing, not met)  3. Patient to have decreased pain to 2/10 or less with activities to improve quality of movement. (progressing, not met)  4. Patient to demo increase GH ROM to WNL to improve functional mobility. (progressing, not met)  5. Patient to perform daily activities including household duties, reaching, lifting, recreational activities without limitation. (progressing, not met)    Plan     Continue with PT POC to address functional deficits.     Sudhir Hernandez, PT DPT

## 2023-05-29 NOTE — TELEPHONE ENCOUNTER
Quick DC. Inappropriate Request    Refill Authorization Note   Nena Franco  is requesting a refill authorization.  Brief Assessment and Rationale for Refill:  Quick Discontinue  Medication Therapy Plan:  D/C 12/5/22    Medication Reconciliation Completed:  No      Comments:     Note composed:4:22 AM 05/29/2023

## 2023-06-05 ENCOUNTER — CLINICAL SUPPORT (OUTPATIENT)
Dept: REHABILITATION | Facility: HOSPITAL | Age: 58
End: 2023-06-05
Payer: COMMERCIAL

## 2023-06-05 DIAGNOSIS — R29.898 WEAKNESS OF LEFT UPPER EXTREMITY: ICD-10-CM

## 2023-06-05 DIAGNOSIS — M25.612 DECREASED ROM OF LEFT SHOULDER: Primary | ICD-10-CM

## 2023-06-05 PROCEDURE — 97530 THERAPEUTIC ACTIVITIES: CPT | Mod: PN

## 2023-06-05 PROCEDURE — 97110 THERAPEUTIC EXERCISES: CPT | Mod: PN

## 2023-06-05 PROCEDURE — 97112 NEUROMUSCULAR REEDUCATION: CPT | Mod: PN

## 2023-06-05 PROCEDURE — 97014 ELECTRIC STIMULATION THERAPY: CPT | Mod: PN

## 2023-06-12 ENCOUNTER — CLINICAL SUPPORT (OUTPATIENT)
Dept: REHABILITATION | Facility: HOSPITAL | Age: 58
End: 2023-06-12
Payer: COMMERCIAL

## 2023-06-12 DIAGNOSIS — M25.612 DECREASED ROM OF LEFT SHOULDER: Primary | ICD-10-CM

## 2023-06-12 DIAGNOSIS — R29.898 WEAKNESS OF LEFT UPPER EXTREMITY: ICD-10-CM

## 2023-06-12 PROCEDURE — 97530 THERAPEUTIC ACTIVITIES: CPT | Mod: PN

## 2023-06-12 PROCEDURE — 97110 THERAPEUTIC EXERCISES: CPT | Mod: PN

## 2023-06-12 PROCEDURE — 97112 NEUROMUSCULAR REEDUCATION: CPT | Mod: PN

## 2023-06-12 NOTE — PROGRESS NOTES
[facility]   Physical Therapy Treatment Note      Name: Nena KNOX [facility] Number: [id number]    Therapy Diagnosis:   Encounter Diagnoses   Name Primary?    Decreased ROM of left shoulder Yes    Weakness of left upper extremity      Physician: Patrick Shi*    Visit Date: 6/12/2023    Physician Orders: PT Eval and Treat  Medical Diagnosis from Referral: Chronic left shoulder pain [M25.512, G89.29]  Evaluation Date: 5/16/2023  Authorization Period Expiration: 5/14/2023  Plan of Care Expiration: 7/11/2023  Progress Note Due: 6/16/2023  Visit # / Visits authorized: 4/20 auth (1/1 auth)   FOTO: 1/ 3      Precautions: Standard, HTN    PTA Visit #: 0/5     Time In: 0130 pm  Time Out: 0211 pm  Total Billable Time: 41 minutes    Subjective     Pt reports: she was able to complete more activities at home this weekend including lifting laundry basket to a shelf. She is having minimal discomfort but she expected that. She has the most discomfort in 90/90 position.    She was compliant with home exercise program.  Response to previous treatment: soreness  Functional change: in progress    Pain: 2/10  Location: left shoulder    Objective      Objective Measures updated at progress report unless specified.     Treatment     Georgina received the treatments listed below:      therapeutic exercises to develop strength, endurance, ROM, flexibility, posture, and core stabilization for (10) minutes including:  UBE forward back 2 min each lvl 3.0  Dowel flexion stretch 2 min 4#  Dowel ER supine, 90/90 position 2 min 4#  Posterior shoulder stretch with red power band 10 reps    neuromuscular re-education activities to improve: Coordination, Kinesthetic, Sense, Proprioception, and Posture for (23) minutes. The following activities were included:  Resisted rows 3x10 reps BTB  Resisted ER/IR 3x10 reps ea towel roll RTB  Prone row 2x10 reps 5#  Prone extension 2x10 reps 3#  Side lying ER 2x10 reps  3#    therapeutic activities to improve functional performance for (8) minutes, including:  Pulleys flex 2 min  Pulleys abd 2 min  Wall slides with towel B x20 reps, single arm  Wall slides with stability ball x10 reps      Patient Education and Home Exercises       Education provided:   - HEP provided and reviewed  - Anatomy and Physiology pertaining to current condition  - Possible response to exercise  - Importance of PT compliance    Written Home Exercises Provided: Patient instructed to cont prior HEP. Exercises were reviewed and Georgina was able to demonstrate them prior to the end of the session.  Georgina demonstrated good  understanding of the education provided. See EMR under Patient Instructions for exercises provided during therapy sessions    Assessment     She plans to continue with HEP for the coming weeks and see what progress she makes. Overall the shoulder is much better than when she started. Possible DC in a few weeks once she calls to update.     Georgina Is progressing well towards her goals.   Pt prognosis is Good.     Pt will continue to benefit from skilled outpatient physical therapy to address the deficits listed in the problem list box on initial evaluation, provide pt/family education and to maximize pt's level of independence in the home and community environment.     Pt's spiritual, cultural and educational needs considered and pt agreeable to plan of care and goals.     Anticipated barriers to physical therapy: work schedule    Goals:  Short Term Goals: In 4 weeks   1.Pt to be educated on HEP. (met)  2.Patient to increase GH ROM from by 5-10 degrees in limited planes to improve functional mobility. (met)  3.Patient to increase strength by 1/2 grade to improve functional tasks. (met)  4.Patient to have pain less than 4/10 at all times to improve quality of movement. (met)  5.Patient to improve score on the FOTO by 5%. (progressing, not met)  6. Pt to be educated on postural and body mechanics  awareness. (met)     Long Term Goals: In 8 weeks  1. Patient to improve score on the FOTO limitation to 34% or less. (progressing, not met)  2. Patient to demo increase in UE strength to 5/5 to improve functional tasks. (progressing, not met)  3. Patient to have decreased pain to 2/10 or less with activities to improve quality of movement. (progressing, not met)  4. Patient to demo increase GH ROM to WNL to improve functional mobility. (progressing, not met)  5. Patient to perform daily activities including household duties, reaching, lifting, recreational activities without limitation. (progressing, not met)    Plan     Possible DC if she continues with HEP without issues.     Sudhir Hernandez, PT DPT

## 2023-06-28 ENCOUNTER — PATIENT MESSAGE (OUTPATIENT)
Dept: FAMILY MEDICINE | Facility: CLINIC | Age: 58
End: 2023-06-28
Payer: COMMERCIAL

## 2023-06-28 DIAGNOSIS — F41.9 ANXIETY: ICD-10-CM

## 2023-06-29 ENCOUNTER — PATIENT MESSAGE (OUTPATIENT)
Dept: FAMILY MEDICINE | Facility: CLINIC | Age: 58
End: 2023-06-29
Payer: COMMERCIAL

## 2023-06-29 DIAGNOSIS — G43.409 HEMIPLEGIC MIGRAINE WITHOUT STATUS MIGRAINOSUS, NOT INTRACTABLE: ICD-10-CM

## 2023-06-29 DIAGNOSIS — M81.0 AGE-RELATED OSTEOPOROSIS WITHOUT CURRENT PATHOLOGICAL FRACTURE: ICD-10-CM

## 2023-06-29 DIAGNOSIS — E55.9 VITAMIN D DEFICIENCY: Primary | ICD-10-CM

## 2023-06-29 RX ORDER — KETOROLAC TROMETHAMINE 10 MG/1
10 TABLET, FILM COATED ORAL EVERY 6 HOURS PRN
Qty: 20 TABLET | Refills: 0 | Status: SHIPPED | OUTPATIENT
Start: 2023-06-29 | End: 2023-07-04

## 2023-06-29 RX ORDER — LORAZEPAM 0.5 MG/1
0.5 TABLET ORAL DAILY PRN
Qty: 30 TABLET | Refills: 0 | Status: SHIPPED | OUTPATIENT
Start: 2023-06-29 | End: 2023-11-21 | Stop reason: SDUPTHER

## 2023-06-29 RX ORDER — ERGOCALCIFEROL 1.25 MG/1
50000 CAPSULE ORAL
Qty: 12 CAPSULE | Refills: 2 | Status: SHIPPED | OUTPATIENT
Start: 2023-06-29 | End: 2023-08-29

## 2023-06-29 RX ORDER — IBANDRONATE SODIUM 150 MG/1
150 TABLET, FILM COATED ORAL
Qty: 3 TABLET | Refills: 2 | Status: SHIPPED | OUTPATIENT
Start: 2023-06-29 | End: 2023-09-18 | Stop reason: SDUPTHER

## 2023-06-29 NOTE — TELEPHONE ENCOUNTER
No care due was identified.  [facility] Embedded Care Due Messages. Reference number: [id number].   6/28/2023 8:23:24 PM CDT

## 2023-07-20 ENCOUNTER — PATIENT MESSAGE (OUTPATIENT)
Dept: FAMILY MEDICINE | Facility: CLINIC | Age: 58
End: 2023-07-20
Payer: COMMERCIAL

## 2023-07-20 DIAGNOSIS — E03.9 HYPOTHYROIDISM, UNSPECIFIED TYPE: ICD-10-CM

## 2023-07-20 DIAGNOSIS — R73.02 GLUCOSE INTOLERANCE (IMPAIRED GLUCOSE TOLERANCE): ICD-10-CM

## 2023-07-20 DIAGNOSIS — E66.9 OBESITY (BMI 30-39.9): ICD-10-CM

## 2023-07-20 DIAGNOSIS — I10 ESSENTIAL HYPERTENSION: Primary | ICD-10-CM

## 2023-07-20 DIAGNOSIS — Z00.00 WELLNESS EXAMINATION: ICD-10-CM

## 2023-07-20 DIAGNOSIS — E78.5 DYSLIPIDEMIA: ICD-10-CM

## 2023-07-20 NOTE — TELEPHONE ENCOUNTER
Labs ordered for [facility]--  Pt requesting Labs to be sent to [facility]-- Labs re-ordered Per [facility] for [facility]     CMP  A1C  TSH  T4

## 2023-07-20 NOTE — TELEPHONE ENCOUNTER
Labs faxed    Your fax has been successfully sent to [phone] at [phone].  ------------------------------------------------------------  From: [id number]  ------------------------------------------------------------  7/20/2023 9:11:02 AM Transmission Record   Sent to [phone] with remote ID "   Result: (0/339;0/0) Success   Page record: 1 - 7   Elapsed time: 03:00 on channel 62

## 2023-07-26 ENCOUNTER — OFFICE VISIT (OUTPATIENT)
Dept: URGENT CARE | Facility: CLINIC | Age: 58
End: 2023-07-26
Payer: COMMERCIAL

## 2023-07-26 VITALS
DIASTOLIC BLOOD PRESSURE: 84 MMHG | TEMPERATURE: 98 F | HEIGHT: 60 IN | WEIGHT: 185 LBS | SYSTOLIC BLOOD PRESSURE: 122 MMHG | BODY MASS INDEX: 36.32 KG/M2 | RESPIRATION RATE: 16 BRPM | OXYGEN SATURATION: 95 % | HEART RATE: 68 BPM

## 2023-07-26 DIAGNOSIS — R35.0 URINE FREQUENCY: ICD-10-CM

## 2023-07-26 DIAGNOSIS — J30.89 SEASONAL ALLERGIC RHINITIS DUE TO OTHER ALLERGIC TRIGGER: Primary | ICD-10-CM

## 2023-07-26 PROBLEM — J30.9 ALLERGIC RHINITIS: Status: ACTIVE | Noted: 2023-07-26

## 2023-07-26 LAB
BILIRUB UR QL STRIP: NEGATIVE
GLUCOSE UR QL STRIP: NEGATIVE
KETONES UR QL STRIP: NEGATIVE
LEUKOCYTE ESTERASE UR QL STRIP: NEGATIVE
PH, POC UA: 6.5 (ref 5–8)
POC BLOOD, URINE: NEGATIVE
POC NITRATES, URINE: NEGATIVE
PROT UR QL STRIP: NEGATIVE
SP GR UR STRIP: 1.01 (ref 1–1.03)
UROBILINOGEN UR STRIP-ACNC: NORMAL (ref 0.1–1.1)

## 2023-07-26 PROCEDURE — 81003 POCT URINALYSIS, DIPSTICK, AUTOMATED, W/O SCOPE: ICD-10-PCS | Mod: QW,S$GLB,, | Performed by: INTERNAL MEDICINE

## 2023-07-26 PROCEDURE — 99213 OFFICE O/P EST LOW 20 MIN: CPT | Mod: S$GLB,,, | Performed by: INTERNAL MEDICINE

## 2023-07-26 PROCEDURE — 81003 URINALYSIS AUTO W/O SCOPE: CPT | Mod: QW,S$GLB,, | Performed by: INTERNAL MEDICINE

## 2023-07-26 PROCEDURE — 99213 PR OFFICE/OUTPT VISIT, EST, LEVL III, 20-29 MIN: ICD-10-PCS | Mod: S$GLB,,, | Performed by: INTERNAL MEDICINE

## 2023-07-26 RX ORDER — FLUTICASONE PROPIONATE 50 MCG
2 SPRAY, SUSPENSION (ML) NASAL DAILY
Qty: 16 G | Refills: 0 | Status: ON HOLD | OUTPATIENT
Start: 2023-07-26 | End: 2023-12-29 | Stop reason: CLARIF

## 2023-07-26 RX ORDER — BENZONATATE 100 MG/1
100 CAPSULE ORAL 2 TIMES DAILY PRN
Qty: 14 CAPSULE | Refills: 0 | Status: SHIPPED | OUTPATIENT
Start: 2023-07-26 | End: 2023-08-02

## 2023-07-26 NOTE — PROGRESS NOTES
Subjective:      Patient ID: Nena Franco is a 58 y.o. female.    Vitals:  height is 5' (1.524 m) and weight is 83.9 kg (185 lb). Her oral temperature is 98 °F (36.7 °C). Her blood pressure is 122/84 and her pulse is 68. Her respiration is 16 and oxygen saturation is 95%.     Chief Complaint: Sore Throat (Upper respiratory plus possible uti - Entered by patient)    Pt present today c/o sore throat and possible UTI. Pt says it started with her sinus last week, she says now it feel like its upper raspatory, she been coughing congested and sore throat. Taking benadryl with no relief. She says  three days ago she started to have some urgency, lower back pains and a little burning, not taking anything for UTI symptoms.     Other  This is a recurrent problem. The current episode started 1 to 4 weeks ago. The problem occurs constantly. The problem has been unchanged. Associated symptoms include congestion, coughing, a fever, headaches and a sore throat. Nothing aggravates the symptoms. She has tried NSAIDs for the symptoms. The treatment provided no relief.   Urinary Tract Infection   This is a new problem. The current episode started in the past 7 days. The problem has been unchanged. The quality of the pain is described as burning. The pain is at a severity of 4/10. The pain is mild. The maximum temperature recorded prior to her arrival was 100 - 100.9 F. She is Sexually active. There is No history of pyelonephritis. Associated symptoms include urgency. She has tried nothing for the symptoms. The treatment provided no relief.     Constitution: Positive for fever.   HENT:  Positive for congestion and sore throat.    Respiratory:  Positive for cough.    Genitourinary:  Positive for urgency.   Neurological:  Positive for headaches.    Objective:     Physical Exam   Constitutional: She is oriented to person, place, and time. She appears well-developed. She is cooperative.  Non-toxic appearance. She does not appear ill.  No distress.   HENT:   Head: Normocephalic and atraumatic.   Ears:   Right Ear: Hearing, tympanic membrane, external ear and ear canal normal.   Left Ear: Hearing, tympanic membrane, external ear and ear canal normal.   Nose: Rhinorrhea and congestion present. No mucosal edema or nasal deformity. No epistaxis. Right sinus exhibits no maxillary sinus tenderness and no frontal sinus tenderness. Left sinus exhibits no maxillary sinus tenderness and no frontal sinus tenderness.   Mouth/Throat: Uvula is midline, oropharynx is clear and moist and mucous membranes are normal. No trismus in the jaw. Normal dentition. No uvula swelling. No oropharyngeal exudate, posterior oropharyngeal edema or posterior oropharyngeal erythema.   Eyes: Conjunctivae and lids are normal. No scleral icterus.   Neck: Trachea normal and phonation normal. Neck supple. No edema present. No erythema present. No neck rigidity present.   Cardiovascular: Normal rate, regular rhythm, normal heart sounds and normal pulses.   Pulmonary/Chest: Effort normal and breath sounds normal. No respiratory distress. She has no decreased breath sounds. She has no rhonchi.   Abdominal: Normal appearance.   Musculoskeletal: Normal range of motion.         General: No deformity. Normal range of motion.   Neurological: She is alert and oriented to person, place, and time. She exhibits normal muscle tone. Coordination normal.   Skin: Skin is warm, dry, intact, not diaphoretic and not pale.   Psychiatric: Her speech is normal and behavior is normal. Judgment and thought content normal.   Nursing note and vitals reviewed.    Assessment:     1. Seasonal allergic rhinitis due to other allergic trigger    2. Urine frequency        Plan:       Seasonal allergic rhinitis due to other allergic trigger  -     fluticasone propionate (FLONASE) 50 mcg/actuation nasal spray; 2 sprays (100 mcg total) by Each Nostril route once daily.  Dispense: 16 g; Refill: 0  -     benzonatate  (MARINE SOL) 100 MG capsule; Take 1 capsule (100 mg total) by mouth 2 (two) times daily as needed for Cough.  Dispense: 14 capsule; Refill: 0    Urine frequency  -     POCT Urinalysis, Dipstick, Automated, W/O Scope      Patient Instructions   If your condition worsens we recommend that you receive another evaluation at the emergency room immediately or contact your primary medical clinics after hours call service to discuss your concerns. You must understand that you've received an Urgent Care treatment only and that you may be released before all of your medical problems are known or treated. You, the patient, will arrange for follow up care as instructed.  Drink plenty of Fluids  Wash hands frequently using mild antibacterial soap lathering for at least 15 seconds then rinse  Get plenty of Rest  Salt water gargles  Follow up in 1-2 weeks with Primary Care physician if not significantly better.   If you are not allergic please Tylenol every 4-6 hours as needed and/or Ibuprofen every 6-8 hours as needed, over the counter for pain or fever.  Take OTC Cough/Congestion medicine as needed. Talk to your pharmacist about the best option for you.

## 2023-08-10 DIAGNOSIS — I10 ESSENTIAL HYPERTENSION: ICD-10-CM

## 2023-08-10 RX ORDER — HYDROCHLOROTHIAZIDE 12.5 MG/1
12.5 CAPSULE ORAL
Qty: 90 CAPSULE | Refills: 3 | OUTPATIENT
Start: 2023-08-10

## 2023-08-10 NOTE — TELEPHONE ENCOUNTER
No care due was identified.  [facility] Embedded Care Due Messages. Reference number: [id number].   8/10/2023 12:08:17 AM CDT

## 2023-08-10 NOTE — TELEPHONE ENCOUNTER
Refill Decision Note   Nena Franco  is requesting a refill authorization.  Brief Assessment and Rationale for Refill:  Quick Discontinue     Medication Therapy Plan:  The original prescription was discontinued on 12/5/2022 by iTno Weaver MD.      Comments:     Note composed:9:37 AM 08/10/2023             Appointments     Last Visit   5/15/2023 Patrick Shi MD   Next Visit   9/18/2023 Patrick Shi MD           Appointments     Last Visit   5/15/2023 Patrick Shi MD   Next Visit   9/18/2023 Patrick Shi MD

## 2023-08-14 ENCOUNTER — PATIENT MESSAGE (OUTPATIENT)
Dept: ADMINISTRATIVE | Facility: HOSPITAL | Age: 58
End: 2023-08-14
Payer: COMMERCIAL

## 2023-08-17 ENCOUNTER — PATIENT OUTREACH (OUTPATIENT)
Dept: ADMINISTRATIVE | Facility: HOSPITAL | Age: 58
End: 2023-08-17
Payer: COMMERCIAL

## 2023-08-17 NOTE — PROGRESS NOTES
Population Health Chart Review & Patient Outreach Details:     Reason for Outreach Encounter:     [x]  Non-Compliant Report   []  Payor Report (Hum[employer], PHN, BCBS, MSSP, MCIP, UHC, etc.)   []  Pre-Visit Chart Review     Updates Requested / Reviewed:     []  Care Everywhere    []     []  External Sources ([facility], [facility], DIS, etc.)   []  Care Team Updated    Patient Outreach Method:    []  Telephone Outreach Completed   [] Successful   [] Left Voicemail   [] Unable to Contact (wrong number, no voicemail)  []  [employer] Portal Outreach Sent  []  Letter Outreach Mailed  []  Fax Sent for External Records  []  External Records Upload    Health Maintenance Topics Addressed and Outreach Outcomes / Actions Taken:        [x]      Breast Cancer Screening []  Mammo Scheduled      []  External Records Requested     []  Added Reminder to Complete to Upcoming Primary Care Appt Notes     []  Patient Declined     []  Patient Will Call Back to Schedule     []  Patient Will Schedule with External Provider / Order Routed if Applicable             []       Cervical Cancer Screening []  Pap Scheduled      []  External Records Requested     []  Added Reminder to Complete to Upcoming Primary Care Appt Notes     []  Patient Declined     []  Patient Will Call Back to Schedule     []  Patient Will Schedule with External Provider               []          Colorectal Cancer Screening []  Colonoscopy Case Request or Referral Placed     []  External Records Requested     []  Added Reminder to Complete to Upcoming Primary Care Appt Notes     []  Patient Declined     []  Patient Will Call Back to Schedule     []  Patient Will Schedule with External Provider     []  Fit Kit Mailed (add the SmartPhrase under additional notes)     []  Reminded Patient to Complete Home Test             []      Diabetic Eye Exam []  Eye Camera Scheduled or Optometry Referral Placed     []  External Records Requested     []  Added Reminder to Complete to  Upcoming Primary Care Appt Notes     []  Patient Declined     []  Patient Will Call Back to Schedule     []  Patient Will Schedule with External Provider             []      Blood Pressure Control []  Primary Care Follow Up Visit Scheduled     []  Remote Blood Pressure Reading Captured     []  Added Reminder to Complete to Upcoming Primary Care Appt Notes     []  Patient Declined     []  Patient Will Call Back / Patient Will Send Portal Message with Reading     []  Patient Will Call Back to Schedule Provider Visit             []       HbA1c & Other Labs []  Lab Appt Scheduled for Due Labs     []  Primary Care Follow Up Visit Scheduled      []  Reminded Patient to Complete Home Test     []  Added Reminder to Complete to Upcoming Primary Care Appt Notes     []  Patient Declined     []  Patient Will Call Back to Schedule     []  Patient Will Schedule with External Provider / Order Routed if Applicable           []    Schedule Primary Care Appt []  Primary Care Appt Scheduled     []  Patient Declined     []  Patient Will Call Back to Schedule     []  Pt Established with External Provider & Updated Care Team             []      Medication Adherence []  Primary Care Appointment Scheduled     []  Added Reminder to Upcoming Primary Care Appt Notes     []  Patient Reminded to  Prescription     []  Patient Declined, Provider Notified if Needed     []  Sent Provider Message to Review and/or Add Exclusion to Problem List             []      Osteoporosis Screening []  DXA Appointment Scheduled     []  External Records Requested     []  Added Reminder to Complete to Upcoming Primary Care Appt Notes     []  Patient Declined     []  Patient Will Call Back to Schedule     []  Patient Will Schedule with External Provider / Order Routed if Applicable     Additional Care Coordinator Notes:     Breast surgeon orders mammos    Further Action Needed If Patient Returns Outreach:

## 2023-08-18 DIAGNOSIS — N63.22 MASS OF UPPER INNER QUADRANT OF LEFT BREAST: Primary | ICD-10-CM

## 2023-08-18 NOTE — PROGRESS NOTES
[facility]  MD Irwin Rivas, NP-C    Chief Complaint:   Nena Franco is a 58 y.o. female presenting today for  annual follow up. She is due for Mammogram  She reports no interval changes on her self-breast examination.     History of Present Illness:   Mrs. Franco first presented on March 23, 2015 due to a complex left breast mass and biopsy showed only benign changes. MD::: Patrick Shi MD.    Past Medical History:   Diagnosis Date    Adenoma of pituitary 2002    benign, no longer visible on CT scan after treatment s/p bromocriptine (Dr Guevara)    Anxiety     Gastroesophageal reflux disease with esophagitis without hemorrhage     Hiatal hernia     History of gastric ulcer     esophogeal ulcer 2004    History of pyelonephritis     Three day hospital admission    HSV infection     Hypothyroidism     Kidney stones     multi    Mass of upper inner quadrant of left breast 8/18/2023    Migraine, hemiplegic     complex     Osteoporosis 2014    early     Rosacea     Sustained SVT     Tx w Digoxin    Tubular adenoma of colon     Vitamin D deficiency       Past Surgical History:   Procedure Laterality Date    APPENDECTOMY  1995    BREAST BIOPSY Left 2015    bengin w marker ([facility])     BREAST SURGERY  2019    Suction bx left breast    CARDIAC CATHETERIZATION      CHOLECYSTECTOMY  1996    lap baldev with scar revision    COLONOSCOPY W/ BIOPSIES AND POLYPECTOMY      Multi; 2014, 2017    COSMETIC SURGERY  Tummy tuck    2016    ESOPHAGOGASTRODUODENOSCOPY  2014    Dr. Duran    EXPLORATORY LAPAROTOMY  1990    Ruptured left ectopic pregnancy    GANGLION CYST EXCISION      right wrist    HERNIA REPAIR  10/2016    x 2  midline    HYSTERECTOMY  2009    Jerel bs/o    HYSTERECTOMY, TOTAL, LAPAROSCOPIC, WITH SALPINGO-OOPHORECTOMY  2009    INCISION AND DRAINAGE OF WOUND      spider bite to chest wall    INCISIONAL HERNIA REPAIR  2009    umbilical    INGUINAL HERNIA  REPAIR Bilateral 10/2016    LAPAROSCOPIC LYSIS OF ADHESIONS N/A 05/09/2020    Procedure: LYSIS, ADHESIONS, LAPAROSCOPIC;  Surgeon: Brett Huber MD;  Location: [facility] OR;  Service: General;  Laterality: N/A;    left foot  1982    orthopedic repair; trauma lacertation     LITHOTRIPSY  2006    right kidney stone     [place] EXPLORATORY OF ABDOMEN      for ruptured ectopic    SMALL INTESTINE SURGERY  05/2020    Small bowel obstrustion SERAFIN        Current Outpatient Medications:     cyclobenzaprine (FLEXERIL) 10 MG tablet, Take 10 mg by mouth nightly as needed (migraine headache). , Disp: , Rfl:     diltiaZEM (CARDIZEM CD) 240 MG 24 hr capsule, Take 1 capsule (240 mg total) by mouth once daily., Disp: 30 capsule, Rfl: 2    ergocalciferol (ERGOCALCIFEROL) 50,000 unit Cap, Take 1 capsule (50,000 Units total) by mouth every 7 days. Alternating weeks with 50,000 units, Disp: 12 capsule, Rfl: 2    fluticasone propionate (FLONASE) 50 mcg/actuation nasal spray, 1 spray by Each Nostril route daily as needed for Allergies., Disp: , Rfl:     fluticasone propionate (FLONASE) 50 mcg/actuation nasal spray, 2 sprays (100 mcg total) by Each Nostril route once daily., Disp: 16 g, Rfl: 0    ibandronate (BONIVA) 150 mg tablet, Take 1 tablet (150 mg total) by mouth every 30 days., Disp: 3 tablet, Rfl: 2    levocetirizine (XYZAL) 5 MG tablet, TAKE 1 TABLET BY MOUTH EVERY DAY (Patient taking differently: Take 5 mg by mouth once daily.), Disp: 90 tablet, Rfl: 3    levothyroxine (SYNTHROID) 100 MCG tablet, TAKE 1 TABLET BY MOUTH EVERY MORNING ON AN EMPTY STOMACH, Disp: 90 tablet, Rfl: 3    LORazepam (ATIVAN) 0.5 MG tablet, Take 1 tablet (0.5 mg total) by mouth daily as needed for Anxiety. (Patient not taking: Reported on 7/26/2023), Disp: 30 tablet, Rfl: 0    naproxen (EC NAPROSYN) 500 MG EC tablet, Take 1 tablet (500 mg total) by mouth daily as needed (migraine headaches)., Disp: 90 tablet, Rfl: 1    pantoprazole (PROTONIX) 40 MG tablet, Take 1  tablet (40 mg total) by mouth every evening., Disp: 90 tablet, Rfl: 0    potassium chloride SA (K-DUR,KLOR-CON M) 10 MEQ tablet, Take 1 tablet (10 mEq total) by mouth once daily., Disp: 90 tablet, Rfl: 2    sucralfate (CARAFATE) 1 gram tablet, TAKE 1 TABLET (1 G TOTAL) BY MOUTH 2 (TWO) TIMES DAILY., Disp: 180 tablet, Rfl: 3    valACYclovir (VALTREX) 1000 MG tablet, Take 2 tablets (2,000 mg total) by mouth every 12 (twelve) hours as needed (for 2 days (repeat as needed for cold sore)., Disp: , Rfl:    Review of patient's allergies indicates:   Allergen Reactions    Adhesive      Sensitive to Steri-strips     Levaquin [levofloxacin] Other (See Comments)     Tendons leg hurt     Guaifenesin Rash     Rash legs      Social History     Tobacco Use    Smoking status: Never    Smokeless tobacco: Never   Substance Use Topics    Alcohol use: Yes     Alcohol/week: 1.0 standard drink of alcohol     Types: 1 Glasses of wine per week     Comment: Rarely      Family History   Problem Relation Age of Onset    Cancer Mother 45        Colon    Hypertension Mother     Miscarriages / Stillbirths Mother         8    Deep vein thrombosis Mother     Lupus Mother     Fibromyalgia Mother     Cancer Father         Colon    Hypertension Father     Heart attack Father     Abnormal EKG Father     Arthritis Maternal Uncle     Cancer Maternal Uncle         Melanoma    Arthritis Maternal Grandfather     Cancer Maternal Grandfather         Lung    Heart disease Paternal Grandmother         SVT CHF    Hypertension Paternal Grandmother     Vision loss Maternal Grandmother         Cataracts        Review of Systems   Integumentary:  Negative for color change, rash, mole/lesion, breast mass, breast discharge and breast tenderness.   Breast: Negative for mass and tenderness       Physical Exam   HENT:   Head: Normocephalic.   Pulmonary/Chest: Right breast exhibits no inverted nipple, no mass, no nipple discharge, no skin change and no tenderness. Left  breast exhibits no inverted nipple, no mass, no nipple discharge, no skin change and no tenderness. No breast swelling.   Genitourinary: No breast swelling.   Musculoskeletal: Lymphadenopathy:      Upper Body:      Right upper body: No supraclavicular or axillary adenopathy.      Left upper body: No supraclavicular or axillary adenopathy.     Neurological: She is alert.      MAMMOGRAM REPORT: She has some diffuse fibronodular tissue; there are no spiculated lesions, distortions or suspicious calcifications noted; NEM    NOTE:::We viewed her films together at today's visit.  We discussed the multiple views obtained and the important findings.  Even benign changes were mentioned and her questions were answered.  She knows that she may receive a formal letter or report from the Radiologist.  She is to contact us if she has questions.      Assessment/Plan  1. Mass of upper inner quadrant of left breast  Assessment & Plan:  We reviewed our findings today and her questions were answered.  She understands that her imaging and exams have remained stable (and show nothing concerning).  She is comfortable being followed in a conservative fashion.      She understands the importance of monthly self-breast examination and knows to report any and all changes as they occur.               Medical Decision Making:  It is my impression that this patient suffers all conditions contained in this medical document.  Each of these conditions did affect our plan of care and my medical decision making today.  It is my opinion that the medical decision making concerning this patient was of moderate difficulty based on the aforementioned conditions.  Any further recommendations will be communicated to the patient by me.  I have reviewed and verified her allergies, list of medications, medical and surgical histories, social history, and a pertinent review of symptoms.      Follow up:  1 year and prn    For:  ALL EMMANUEL) at

## 2023-08-28 ENCOUNTER — OFFICE VISIT (OUTPATIENT)
Dept: SURGERY | Facility: CLINIC | Age: 58
End: 2023-08-28
Payer: COMMERCIAL

## 2023-08-28 DIAGNOSIS — N63.22 MASS OF UPPER INNER QUADRANT OF LEFT BREAST: ICD-10-CM

## 2023-08-28 LAB — BCS RECOMMENDATION EXT: NORMAL

## 2023-08-28 PROCEDURE — 99213 PR OFFICE/OUTPT VISIT, EST, LEVL III, 20-29 MIN: ICD-10-PCS | Mod: S$GLB,,, | Performed by: NURSE PRACTITIONER

## 2023-08-28 PROCEDURE — 99213 OFFICE O/P EST LOW 20 MIN: CPT | Mod: S$GLB,,, | Performed by: NURSE PRACTITIONER

## 2023-08-29 DIAGNOSIS — E55.9 VITAMIN D DEFICIENCY: ICD-10-CM

## 2023-08-29 LAB
ALBUMIN SERPL-MCNC: 4.5 G/DL (ref 3.8–4.9)
ALBUMIN/GLOB SERPL: 2 {RATIO} (ref 1.2–2.2)
ALP SERPL-CCNC: 101 IU/L (ref 44–121)
ALT SERPL-CCNC: 21 IU/L (ref 0–32)
AST SERPL-CCNC: 18 IU/L (ref 0–40)
BILIRUB SERPL-MCNC: 0.3 MG/DL (ref 0–1.2)
BUN SERPL-MCNC: 14 MG/DL (ref 6–24)
BUN/CREAT SERPL: 19 (ref 9–23)
CALCIUM SERPL-MCNC: 9.8 MG/DL (ref 8.7–10.2)
CHLORIDE SERPL-SCNC: 104 MMOL/L (ref 96–106)
CHOLEST SERPL-MCNC: 206 MG/DL (ref 100–199)
CO2 SERPL-SCNC: 25 MMOL/L (ref 20–29)
CREAT SERPL-MCNC: 0.72 MG/DL (ref 0.57–1)
EST. GFR  (NO RACE VARIABLE): 96 ML/MIN/1.73
GLOBULIN SER CALC-MCNC: 2.3 G/DL (ref 1.5–4.5)
GLUCOSE SERPL-MCNC: 99 MG/DL (ref 70–99)
HBA1C MFR BLD: 5.9 % (ref 4.8–5.6)
HDLC SERPL-MCNC: 58 MG/DL
LDLC SERPL CALC-MCNC: 119 MG/DL (ref 0–99)
POTASSIUM SERPL-SCNC: 4.6 MMOL/L (ref 3.5–5.2)
PROT SERPL-MCNC: 6.8 G/DL (ref 6–8.5)
SODIUM SERPL-SCNC: 143 MMOL/L (ref 134–144)
T4 FREE SERPL-MCNC: 1.6 NG/DL (ref 0.82–1.77)
TRIGL SERPL-MCNC: 168 MG/DL (ref 0–149)
TSH SERPL DL<=0.005 MIU/L-ACNC: 2.42 UIU/ML (ref 0.45–4.5)
VLDLC SERPL CALC-MCNC: 29 MG/DL (ref 5–40)

## 2023-08-29 RX ORDER — ERGOCALCIFEROL 1.25 MG/1
50000 CAPSULE ORAL
Qty: 12 CAPSULE | Refills: 2 | Status: SHIPPED | OUTPATIENT
Start: 2023-08-29

## 2023-09-05 ENCOUNTER — PATIENT OUTREACH (OUTPATIENT)
Dept: ADMINISTRATIVE | Facility: HOSPITAL | Age: 58
End: 2023-09-05
Payer: COMMERCIAL

## 2023-09-05 NOTE — PROGRESS NOTES
Population Health Chart Review & Patient Outreach Details:     Reason for Outreach Encounter:     [x]  Non-Compliant Report   []  Payor Report (Hum[employer], PHN, BCBS, MSSP, MCIP, UHC, etc.)   []  Pre-Visit Chart Review     Updates Requested / Reviewed:     []  Care Everywhere    []     []  External Sources ([facility], [facility], DIS, etc.)   []  Care Team Updated    Patient Outreach Method:    []  Telephone Outreach Completed   [] Successful   [] Left Voicemail   [] Unable to Contact (wrong number, no voicemail)  []  [email or web address] Portal Outreach Sent  []  Letter Outreach Mailed  []  Fax Sent for External Records  [x]  External Records Upload    Health Maintenance Topics Addressed and Outreach Outcomes / Actions Taken:        [x]      Breast Cancer Screening []  Mammo Scheduled      []  External Records Requested     []  Added Reminder to Complete to Upcoming Primary Care Appt Notes     []  Patient Declined     []  Patient Will Call Back to Schedule     []  Patient Will Schedule with External Provider / Order Routed if Applicable             []       Cervical Cancer Screening []  Pap Scheduled      []  External Records Requested     []  Added Reminder to Complete to Upcoming Primary Care Appt Notes     []  Patient Declined     []  Patient Will Call Back to Schedule     []  Patient Will Schedule with External Provider               []          Colorectal Cancer Screening []  Colonoscopy Case Request or Referral Placed     []  External Records Requested     []  Added Reminder to Complete to Upcoming Primary Care Appt Notes     []  Patient Declined     []  Patient Will Call Back to Schedule     []  Patient Will Schedule with External Provider     []  Fit Kit Mailed (add the SmartPhrase under additional notes)     []  Reminded Patient to Complete Home Test             []      Diabetic Eye Exam []  Eye Camera Scheduled or Optometry Referral Placed     []  External Records Requested     []  Added Reminder to Complete to  Upcoming Primary Care Appt Notes     []  Patient Declined     []  Patient Will Call Back to Schedule     []  Patient Will Schedule with External Provider             []      Blood Pressure Control []  Primary Care Follow Up Visit Scheduled     []  Remote Blood Pressure Reading Captured     []  Added Reminder to Complete to Upcoming Primary Care Appt Notes     []  Patient Declined     []  Patient Will Call Back / Patient Will Send Portal Message with Reading     []  Patient Will Call Back to Schedule Provider Visit             []       HbA1c & Other Labs []  Lab Appt Scheduled for Due Labs     []  Primary Care Follow Up Visit Scheduled      []  Reminded Patient to Complete Home Test     []  Added Reminder to Complete to Upcoming Primary Care Appt Notes     []  Patient Declined     []  Patient Will Call Back to Schedule     []  Patient Will Schedule with External Provider / Order Routed if Applicable           []    Schedule Primary Care Appt []  Primary Care Appt Scheduled     []  Patient Declined     []  Patient Will Call Back to Schedule     []  Pt Established with External Provider & Updated Care Team             []      Medication Adherence []  Primary Care Appointment Scheduled     []  Added Reminder to Upcoming Primary Care Appt Notes     []  Patient Reminded to  Prescription     []  Patient Declined, Provider Notified if Needed     []  Sent Provider Message to Review and/or Add Exclusion to Problem List             []      Osteoporosis Screening []  DXA Appointment Scheduled     []  External Records Requested     []  Added Reminder to Complete to Upcoming Primary Care Appt Notes     []  Patient Declined     []  Patient Will Call Back to Schedule     []  Patient Will Schedule with External Provider / Order Routed if Applicable     Additional Care Coordinator Notes:         Further Action Needed If Patient Returns Outreach:

## 2023-09-18 ENCOUNTER — OFFICE VISIT (OUTPATIENT)
Dept: FAMILY MEDICINE | Facility: CLINIC | Age: 58
End: 2023-09-18
Payer: COMMERCIAL

## 2023-09-18 VITALS
RESPIRATION RATE: 16 BRPM | DIASTOLIC BLOOD PRESSURE: 64 MMHG | SYSTOLIC BLOOD PRESSURE: 122 MMHG | HEIGHT: 60 IN | BODY MASS INDEX: 36.68 KG/M2 | WEIGHT: 186.81 LBS | HEART RATE: 50 BPM | OXYGEN SATURATION: 95 %

## 2023-09-18 DIAGNOSIS — K21.00 HIATAL HERNIA WITH GERD AND ESOPHAGITIS: ICD-10-CM

## 2023-09-18 DIAGNOSIS — E78.1 HYPERTRIGLYCERIDEMIA: ICD-10-CM

## 2023-09-18 DIAGNOSIS — E66.9 OBESITY (BMI 30-39.9): ICD-10-CM

## 2023-09-18 DIAGNOSIS — K44.9 HIATAL HERNIA WITH GERD AND ESOPHAGITIS: ICD-10-CM

## 2023-09-18 DIAGNOSIS — M81.0 AGE-RELATED OSTEOPOROSIS WITHOUT CURRENT PATHOLOGICAL FRACTURE: ICD-10-CM

## 2023-09-18 DIAGNOSIS — R74.8 ELEVATED LIVER ENZYMES: ICD-10-CM

## 2023-09-18 DIAGNOSIS — E03.9 HYPOTHYROIDISM, UNSPECIFIED TYPE: ICD-10-CM

## 2023-09-18 DIAGNOSIS — R73.03 PRE-DIABETES: ICD-10-CM

## 2023-09-18 DIAGNOSIS — I10 ESSENTIAL HYPERTENSION: ICD-10-CM

## 2023-09-18 DIAGNOSIS — Z00.00 WELLNESS EXAMINATION: Primary | ICD-10-CM

## 2023-09-18 PROBLEM — N20.0 STRUVITE KIDNEY STONES: Status: ACTIVE | Noted: 2023-09-18

## 2023-09-18 PROCEDURE — 99999 PR PBB SHADOW E&M-EST. PATIENT-LVL IV: CPT | Mod: PBBFAC,,, | Performed by: INTERNAL MEDICINE

## 2023-09-18 PROCEDURE — 99396 PREV VISIT EST AGE 40-64: CPT | Mod: S$GLB,,, | Performed by: INTERNAL MEDICINE

## 2023-09-18 PROCEDURE — 99396 PR PREVENTIVE VISIT,EST,40-64: ICD-10-PCS | Mod: S$GLB,,, | Performed by: INTERNAL MEDICINE

## 2023-09-18 PROCEDURE — 99999 PR PBB SHADOW E&M-EST. PATIENT-LVL IV: ICD-10-PCS | Mod: PBBFAC,,, | Performed by: INTERNAL MEDICINE

## 2023-09-18 RX ORDER — NAPROXEN 500 MG/1
500 TABLET ORAL DAILY PRN
Qty: 90 TABLET | Refills: 1 | Status: SHIPPED | OUTPATIENT
Start: 2023-09-18 | End: 2024-03-18

## 2023-09-18 RX ORDER — IBANDRONATE SODIUM 150 MG/1
150 TABLET, FILM COATED ORAL
Qty: 3 TABLET | Refills: 3 | Status: SHIPPED | OUTPATIENT
Start: 2023-09-18

## 2023-09-18 RX ORDER — SEMAGLUTIDE 0.25 MG/.5ML
0.25 INJECTION, SOLUTION SUBCUTANEOUS
Qty: 4 ML | Refills: 0 | Status: SHIPPED | OUTPATIENT
Start: 2023-09-18 | End: 2024-03-11

## 2023-09-18 RX ORDER — HYDROCHLOROTHIAZIDE 12.5 MG/1
12.5 CAPSULE ORAL DAILY
Status: ON HOLD | COMMUNITY
Start: 2023-06-21 | End: 2024-01-01 | Stop reason: HOSPADM

## 2023-09-18 NOTE — PROGRESS NOTES
Subjective:       Patient ID: Nena Franco is a 58 y.o. female.    Chief Complaint: Follow-up (Patient is here to discuss weight loss due to recent blood work and cardiologist suggestion. Patient states she has been trying to lose weight with diet and exercise for 8 months.) and Annual Exam   Follow-up  Associated symptoms include arthralgias, headaches and joint swelling. Pertinent negatives include no chest pain, neck pain, vomiting or weakness.          Gyn: [facility]  -plan Dr Lowe -he also writes compounded Ozempic   MMG:  [facility]om[facility] 8/26/22  Dexa:  Osteoporosis  2018 lumbar -3.3 hips -2.3 //2022 L-3.0 & H -2.8 // Rx Boniva approx 2019 start   Colonoscopy:   11/22/17  Tub adenoma r/c 5  Dr Woo // 12/22 + adenoma r/c 5 yr   Immunizations: Flu: refused Tdap:  2022 Shingles: defer  Covid: defers   Smoker:  Never   Eye: [facility]   Derm:   Note N       Hypertension: controlled Rx Cardizem 240 increased for control BP by cardio but wasn't controlled. She has also restarted Hctz 12.5   (Off Losartan      Heart palpitations:  improved Rx Cardizem 240 & add Atenolol 25 having better HR and BP               Hx sustain SVT, coronary spasm. : dx w Holter. Prev Tx Digoxin  (3-4 months then stopped) mgmt cardio Dr Weaver               Insurance wont cover loop recorder.    Holter on Monday      Mixed HLD: Tg 168      Hypothyroid; controlled Rx Levo 100  TPO TB antibody negative.     Chronic GERD w erosive esophagitis: controled  Rx Protonix 40 daily, Carafate 1 1 daily. // Mgmt Dr Woo  GI   EGD dilated, 2023       Glucose intolerance:  increased A1c 5.9/ G 99.   Recommend low carb diet under 100 grams daily      Migraines:  Complex hemiplegic.  Stable prev Rx Botox Dr. Paniagua.  Improved with CBD oil OTC.    (SE: Imitrex: worse)      Vitamin-D deficiency: controlled  Supplement O2 vitamin-D     Metabolic syndrome BMI 37 & 189, 36/186.         HSV 1-2 recurrent: stable Rx prn + buttock.  Rx Valtrex      Allergies/reactive airway disease:  + symptoms early spring/October.  Rx daily H1 nasal spray, p.r.n. Advair     Lower back pain and more on left side:  On and off;  Using patch's OTC help.  P.r.n. Flexeril.  ____________________________________________________________________________________________________  ____________________________________________________________________________________________________  Assessment & Plan:  1. Wellness examination  - TSH; Future  - T4, Free; Future  - CBC Without Differential; Future  - Comprehensive Metabolic Panel; Future  - Lipid Panel; Future  - Hemoglobin A1C; Future  - TSH  - T4, Free  - CBC Without Differential  - Comprehensive Metabolic Panel  - Lipid Panel  - Hemoglobin A1C    2. Pre-diabetes  - semaglutide, weight loss, (WEGOVY) 0.25 mg/0.5 mL PnIj; Inject 0.25 mg into the skin every 7 days.  Dispense: 4 mL; Refill: 0  - Comprehensive Metabolic Panel; Future  - Hemoglobin A1C; Future  - Comprehensive Metabolic Panel  - Hemoglobin A1C    3. Essential hypertension  - semaglutide, weight loss, (WEGOVY) 0.25 mg/0.5 mL PnIj; Inject 0.25 mg into the skin every 7 days.  Dispense: 4 mL; Refill: 0    4. Hypothyroidism, unspecified type  - TSH; Future  - T4, Free; Future  - TSH  - T4, Free    5. Hypertriglyceridemia  - Lipid Panel; Future  - Lipid Panel    6. Age-related osteoporosis without current pathological fracture  - ibandronate (BONIVA) 150 mg tablet; Take 1 tablet (150 mg total) by mouth every 30 days.  Dispense: 3 tablet; Refill: 3    7. Elevated liver enzymes  - semaglutide, weight loss, (WEGOVY) 0.25 mg/0.5 mL PnIj; Inject 0.25 mg into the skin every 7 days.  Dispense: 4 mL; Refill: 0    8. Hiatal hernia with GERD and esophagitis    9. Obesity (BMI 30-39.9)     Wellness examination  -     TSH; Future; Expected date: 09/18/2023  -     T4, Free; Future; Expected date: 09/18/2023  -     CBC Without Differential; Future; Expected date: 09/18/2023  -     Comprehensive  Metabolic Panel; Future; Expected date: 09/18/2023  -     Lipid Panel; Future; Expected date: 09/18/2023  -     Hemoglobin A1C; Future; Expected date: 09/18/2023    Pre-diabetes  -     semaglutide, weight loss, (WEGOVY) 0.25 mg/0.5 mL PnIj; Inject 0.25 mg into the skin every 7 days.  Dispense: 4 mL; Refill: 0  -     Comprehensive Metabolic Panel; Future; Expected date: 09/18/2023  -     Hemoglobin A1C; Future; Expected date: 09/18/2023    Essential hypertension  -     semaglutide, weight loss, (WEGOVY) 0.25 mg/0.5 mL PnIj; Inject 0.25 mg into the skin every 7 days.  Dispense: 4 mL; Refill: 0    Hypothyroidism, unspecified type  -     TSH; Future; Expected date: 09/18/2023  -     T4, Free; Future; Expected date: 09/18/2023    Hypertriglyceridemia  Comments:  add fiber daily   Orders:  -     Lipid Panel; Future; Expected date: 09/18/2023    Age-related osteoporosis without current pathological fracture  -     ibandronate (BONIVA) 150 mg tablet; Take 1 tablet (150 mg total) by mouth every 30 days.  Dispense: 3 tablet; Refill: 3    Elevated liver enzymes  Comments:  resolved   Orders:  -     semaglutide, weight loss, (WEGOVY) 0.25 mg/0.5 mL PnIj; Inject 0.25 mg into the skin every 7 days.  Dispense: 4 mL; Refill: 0    Hiatal hernia with GERD and esophagitis    Obesity (BMI 30-39.9)    Other orders  -     naproxen (EC NAPROSYN) 500 MG EC tablet; Take 1 tablet (500 mg total) by mouth daily as needed (migraine headaches).  Dispense: 90 tablet; Refill: 1        Continue to work on regular exercise, maintain healthy weight, balanced diet. Avoid unhealthy habits: smoking, excessive alcohol intake.     Disclaimer: This note was partly generated using dictation software which may occasionally result in transcription errors  ____________________________________________________________________________________________________  Review of Systems:  Review of Systems   Constitutional:  Positive for activity change. Negative for  appetite change and unexpected weight change.   HENT:  Positive for rhinorrhea and trouble swallowing. Negative for hearing loss and nosebleeds.    Eyes:  Negative for pain, discharge and visual disturbance.   Respiratory:  Negative for choking, chest tightness and wheezing.    Cardiovascular:  Positive for palpitations. Negative for chest pain.   Gastrointestinal:  Positive for constipation. Negative for blood in stool, diarrhea and vomiting.   Endocrine: Negative for polydipsia and polyuria.   Genitourinary:  Negative for difficulty urinating, dysuria, hematuria and menstrual problem.   Musculoskeletal:  Positive for arthralgias and joint swelling. Negative for neck pain.   Skin:  Negative for pallor.   Neurological:  Positive for headaches. Negative for facial asymmetry and weakness.   Hematological:  Does not bruise/bleed easily.   Psychiatric/Behavioral:  Negative for confusion and dysphoric mood.        Objective:     Wt Readings from Last 3 Encounters:   09/18/23 84.8 kg (186 lb 13.4 oz)   09/11/23 84.8 kg (186 lb 14.4 oz)   07/26/23 83.9 kg (185 lb)     BP Readings from Last 3 Encounters:   09/18/23 122/64   09/11/23 (!) 128/102   07/26/23 122/84       Lab Results   Component Value Date    WBC 7.1 05/08/2023    HGB 13.4 05/08/2023    HCT 39.3 05/08/2023     05/08/2023     08/28/2023    K 4.6 08/28/2023     08/28/2023    ALT 21 08/28/2023    AST 18 08/28/2023    CO2 25 08/28/2023    CREATININE 0.72 08/28/2023    BUN 14 08/28/2023    GLU 99 08/28/2023      Hemoglobin A1C   Date Value Ref Range Status   05/08/2023 5.4 <5.7 % of total Hgb Final     Comment:     For the purpose of screening for the presence of  diabetes:     <5.7%       Consistent with the absence of diabetes  5.7-6.4%    Consistent with increased risk for diabetes              (prediabetes)  > or =6.5%  Consistent with diabetes     This assay result is consistent with a decreased risk  of diabetes.     Currently, no consensus  exists regarding use of  hemoglobin A1c for diagnosis of diabetes in children.     According to American Diabetes Association (ADA)  guidelines, hemoglobin A1c <7.0% represents optimal  control in non-pregnant diabetic patients. Different  metrics may apply to specific patient populations.   Standards of Medical Care in Diabetes(ADA).         03/15/2022 5.6 <5.7 % of total Hgb Final     Comment:     For the purpose of screening for the presence of  diabetes:     <5.7%       Consistent with the absence of diabetes  5.7-6.4%    Consistent with increased risk for diabetes              (prediabetes)  > or =6.5%  Consistent with diabetes     This assay result is consistent with a decreased risk  of diabetes.     Currently, no consensus exists regarding use of  hemoglobin A1c for diagnosis of diabetes in children.     According to American Diabetes Association (ADA)  guidelines, hemoglobin A1c <7.0% represents optimal  control in non-pregnant diabetic patients. Different  metrics may apply to specific patient populations.   Standards of Medical Care in Diabetes(ADA).           Hemoglobin A1c   Date Value Ref Range Status   08/28/2023 5.9 (H) 4.8 - 5.6 % Final     Comment:              Prediabetes: 5.7 - 6.4           Diabetes: >6.4           Glycemic control for adults with diabetes: <7.0        Lab Results   Component Value Date    TSH 2.420 08/28/2023    TSH 1.20 05/08/2023    TSH 1.53 09/12/2022     Lab Results   Component Value Date    FREET4 1.25 08/09/2010    FREET4 1.27 12/10/2009    FREET4 1.30 01/30/2009     Lab Results   Component Value Date    LDLCALC 119 (H) 08/28/2023    LDLCALC 109 (H) 05/08/2023    LDLCALC 111 (H) 03/07/2022     Lab Results   Component Value Date    TRIG 168 (H) 08/28/2023    TRIG 107 05/08/2023    TRIG 141 03/07/2022            Physical Exam  Constitutional:       Appearance: Normal appearance.   HENT:      Head: Normocephalic and atraumatic.   Eyes:      Extraocular Movements: Extraocular  movements intact.      Pupils: Pupils are equal, round, and reactive to light.   Cardiovascular:      Rate and Rhythm: Normal rate and regular rhythm.   Pulmonary:      Effort: Pulmonary effort is normal.      Breath sounds: Normal breath sounds.   Neurological:      Mental Status: She is alert and oriented to person, place, and time.   Psychiatric:         Mood and Affect: Mood normal.         Medication List with Changes/Refills   New Medications    SEMAGLUTIDE, WEIGHT LOSS, (WEGOVY) 0.25 MG/0.5 ML PNIJ    Inject 0.25 mg into the skin every 7 days.   Current Medications    ATENOLOL (TENORMIN) 25 MG TABLET    Take 1 tablet (25 mg total) by mouth once daily.    CYCLOBENZAPRINE (FLEXERIL) 10 MG TABLET    Take 10 mg by mouth nightly as needed (migraine headache).     DILTIAZEM (CARDIZEM CD) 240 MG 24 HR CAPSULE    Take 1 capsule (240 mg total) by mouth once daily.    ERGOCALCIFEROL (ERGOCALCIFEROL) 50,000 UNIT CAP    TAKE 1 CAPSULE (50,000 UNITS TOTAL) BY MOUTH EVERY 7 DAYS. ALTERNATING WEEKS WITH 50,000 UNITS    FLUTICASONE PROPIONATE (FLONASE) 50 MCG/ACTUATION NASAL SPRAY    1 spray by Each Nostril route daily as needed for Allergies.    FLUTICASONE PROPIONATE (FLONASE) 50 MCG/ACTUATION NASAL SPRAY    2 sprays (100 mcg total) by Each Nostril route once daily.    HYDROCHLOROTHIAZIDE (MICROZIDE) 12.5 MG CAPSULE    Take 12.5 mg by mouth once daily.    LEVOCETIRIZINE (XYZAL) 5 MG TABLET    TAKE 1 TABLET BY MOUTH EVERY DAY    LEVOTHYROXINE (SYNTHROID) 100 MCG TABLET    TAKE 1 TABLET BY MOUTH EVERY MORNING ON AN EMPTY STOMACH    LORAZEPAM (ATIVAN) 0.5 MG TABLET    Take 1 tablet (0.5 mg total) by mouth daily as needed for Anxiety.    PANTOPRAZOLE (PROTONIX) 40 MG TABLET    Take 1 tablet (40 mg total) by mouth every evening.    POTASSIUM CHLORIDE SA (K-DUR,KLOR-CON M) 10 MEQ TABLET    Take 1 tablet (10 mEq total) by mouth once daily.    SUCRALFATE (CARAFATE) 1 GRAM TABLET    TAKE 1 TABLET (1 G TOTAL) BY MOUTH 2 (TWO) TIMES  DAILY.    VALACYCLOVIR (VALTREX) 1000 MG TABLET    Take 2 tablets (2,000 mg total) by mouth every 12 (twelve) hours as needed (for 2 days (repeat as needed for cold sore).   Changed and/or Refilled Medications    Modified Medication Previous Medication    IBANDRONATE (BONIVA) 150 MG TABLET ibandronate (BONIVA) 150 mg tablet       Take 1 tablet (150 mg total) by mouth every 30 days.    Take 1 tablet (150 mg total) by mouth every 30 days.    NAPROXEN (EC NAPROSYN) 500 MG EC TABLET naproxen (EC NAPROSYN) 500 MG EC tablet       Take 1 tablet (500 mg total) by mouth daily as needed (migraine headaches).    Take 1 tablet (500 mg total) by mouth daily as needed (migraine headaches).

## 2023-09-22 PROBLEM — K44.9 HIATAL HERNIA WITH GERD AND ESOPHAGITIS: Status: ACTIVE | Noted: 2021-08-23

## 2023-09-22 PROBLEM — K21.00 HIATAL HERNIA WITH GERD AND ESOPHAGITIS: Status: ACTIVE | Noted: 2021-08-23

## 2023-09-22 PROBLEM — R73.03 PRE-DIABETES: Status: ACTIVE | Noted: 2023-09-22

## 2023-09-22 PROBLEM — R74.8 ELEVATED LIVER ENZYMES: Status: ACTIVE | Noted: 2023-09-22

## 2023-09-22 PROBLEM — E78.1 HYPERTRIGLYCERIDEMIA: Status: ACTIVE | Noted: 2023-09-22

## 2023-09-25 ENCOUNTER — PATIENT MESSAGE (OUTPATIENT)
Dept: FAMILY MEDICINE | Facility: CLINIC | Age: 58
End: 2023-09-25
Payer: COMMERCIAL

## 2023-09-26 ENCOUNTER — TELEPHONE (OUTPATIENT)
Dept: FAMILY MEDICINE | Facility: CLINIC | Age: 58
End: 2023-09-26
Payer: COMMERCIAL

## 2023-09-26 ENCOUNTER — OFFICE VISIT (OUTPATIENT)
Dept: FAMILY MEDICINE | Facility: CLINIC | Age: 58
End: 2023-09-26
Payer: COMMERCIAL

## 2023-09-26 VITALS
OXYGEN SATURATION: 94 % | HEIGHT: 60 IN | DIASTOLIC BLOOD PRESSURE: 80 MMHG | HEART RATE: 58 BPM | SYSTOLIC BLOOD PRESSURE: 118 MMHG | WEIGHT: 184.31 LBS | RESPIRATION RATE: 18 BRPM | BODY MASS INDEX: 36.18 KG/M2

## 2023-09-26 DIAGNOSIS — R05.1 ACUTE COUGH: ICD-10-CM

## 2023-09-26 DIAGNOSIS — J02.9 PHARYNGITIS, UNSPECIFIED ETIOLOGY: Primary | ICD-10-CM

## 2023-09-26 DIAGNOSIS — J20.8 ACUTE BRONCHITIS, BACTERIAL: ICD-10-CM

## 2023-09-26 DIAGNOSIS — B96.89 ACUTE BRONCHITIS, BACTERIAL: ICD-10-CM

## 2023-09-26 LAB
CTP QC/QA: YES
CTP QC/QA: YES
MOLECULAR STREP A: NEGATIVE
SARS-COV-2 RDRP RESP QL NAA+PROBE: NEGATIVE

## 2023-09-26 PROCEDURE — 87635: ICD-10-PCS | Mod: QW,S$GLB,,

## 2023-09-26 PROCEDURE — 99999 PR PBB SHADOW E&M-EST. PATIENT-LVL IV: CPT | Mod: PBBFAC,,,

## 2023-09-26 PROCEDURE — 99214 PR OFFICE/OUTPT VISIT, EST, LEVL IV, 30-39 MIN: ICD-10-PCS | Mod: S$GLB,,,

## 2023-09-26 PROCEDURE — 99999 PR PBB SHADOW E&M-EST. PATIENT-LVL IV: ICD-10-PCS | Mod: PBBFAC,,,

## 2023-09-26 PROCEDURE — 87651 STREP A DNA AMP PROBE: CPT | Mod: QW,S$GLB,,

## 2023-09-26 PROCEDURE — 87651 POCT STREP A MOLECULAR: ICD-10-PCS | Mod: QW,S$GLB,,

## 2023-09-26 PROCEDURE — 87635 SARS-COV-2 COVID-19 AMP PRB: CPT | Mod: QW,S$GLB,,

## 2023-09-26 PROCEDURE — 99214 OFFICE O/P EST MOD 30 MIN: CPT | Mod: S$GLB,,,

## 2023-09-26 RX ORDER — PREDNISONE 10 MG/1
10 TABLET ORAL 2 TIMES DAILY
Qty: 10 TABLET | Refills: 0 | Status: SHIPPED | OUTPATIENT
Start: 2023-09-26 | End: 2023-10-01

## 2023-09-26 RX ORDER — BENZONATATE 200 MG/1
200 CAPSULE ORAL 3 TIMES DAILY PRN
Qty: 30 CAPSULE | Refills: 0 | Status: CANCELLED | OUTPATIENT
Start: 2023-09-26 | End: 2023-10-06

## 2023-09-26 RX ORDER — AZITHROMYCIN 250 MG/1
TABLET, FILM COATED ORAL
Qty: 6 TABLET | Refills: 0 | Status: SHIPPED | OUTPATIENT
Start: 2023-09-26 | End: 2023-10-01

## 2023-09-26 RX ORDER — ALBUTEROL SULFATE 90 UG/1
2 AEROSOL, METERED RESPIRATORY (INHALATION) EVERY 6 HOURS PRN
Qty: 18 G | Refills: 0 | Status: ON HOLD | OUTPATIENT
Start: 2023-09-26 | End: 2024-01-01 | Stop reason: HOSPADM

## 2023-09-26 RX ORDER — PROMETHAZINE HYDROCHLORIDE AND DEXTROMETHORPHAN HYDROBROMIDE 6.25; 15 MG/5ML; MG/5ML
5 SYRUP ORAL EVERY 8 HOURS PRN
Qty: 180 ML | Refills: 1 | Status: SHIPPED | OUTPATIENT
Start: 2023-09-26 | End: 2023-10-06

## 2023-09-26 NOTE — PROGRESS NOTES
[facility]  [address]  [place] [address]    Subjective    Chief Complaint:   Chief Complaint   Patient presents with    Sore Throat    Cough     Patients she has been feeling this way for 6 days     2 days ago 101.-108 fever no fever today     Shortness of Breath       History of Present Illness:     Nena Franco is a(n) 58 y.o. female with past medical history as noted below who presents to the clinic today for upper respiratory symptoms.     Reports 6 day onset cough productive of brown sputum, sore throat, and intermittent fever. Last fever was 2 days ago. Reports difficulty breathing d/t throat pain-- not shortness of breath. + right ear fullness. Reports she gets bronchitis every year that has frequently turned into pneumonia.       Problem List:   Patient Active Problem List   Diagnosis    SBO (small bowel obstruction)    Essential hypertension    Migraine without status migrainosus, not intractable    HSV infection    Environmental and seasonal allergies    Vitamin D deficiency    Hypothyroidism    Age-related osteoporosis without current pathological fracture    Menopause    Mixed hyperlipidemia    Hiatal hernia with GERD and esophagitis    Colon adenoma    Hemiplegic migraine without status migrainosus, not intractable    History of pituitary adenoma    Obesity (BMI 30-39.9)    History of supraventricular tachycardia    Coronary artery spasm    Glucose intolerance (impaired glucose tolerance)    Syncope and collapse    PSVT (paroxysmal supraventricular tachycardia)    Vomiting    Abdominal distention    Decreased ROM of left shoulder    Weakness of left upper extremity    Allergic rhinitis    Mass of upper inner quadrant of left breast    Struvite kidney stones    Pre-diabetes    Hypertriglyceridemia    Elevated liver enzymes       Current Outpatient Medications:   Current Outpatient Medications   Medication Instructions    atenoloL (TENORMIN) 25  mg, Oral, Daily    cyclobenzaprine (FLEXERIL) 10 mg, Oral, Nightly PRN    diltiaZEM (CARDIZEM CD) 240 mg, Oral, Daily    ergocalciferol (ERGOCALCIFEROL) 50,000 Units, Oral, Every 7 days, Alternating weeks with 50,000 units    fluticasone propionate (FLONASE) 50 mcg/actuation nasal spray 1 spray, Each Nostril, Daily PRN    fluticasone propionate (FLONASE) 100 mcg, Each Nostril, Daily    hydroCHLOROthiazide (MICROZIDE) 12.5 mg, Oral, Daily    ibandronate (BONIVA) 150 mg, Oral, Every 30 days    levocetirizine (XYZAL) 5 MG tablet TAKE 1 TABLET BY MOUTH EVERY DAY    levothyroxine (SYNTHROID) 100 MCG tablet TAKE 1 TABLET BY MOUTH EVERY MORNING ON AN EMPTY STOMACH    LORazepam (ATIVAN) 0.5 mg, Oral, Daily PRN    naproxen (EC NAPROSYN) 500 mg, Oral, Daily PRN    pantoprazole (PROTONIX) 40 mg, Oral, Nightly    potassium chloride SA (K-DUR,KLOR-CON M) 10 MEQ tablet 10 mEq, Oral, Daily    sucralfate (CARAFATE) 1 g, Oral, 2 times daily    valACYclovir (VALTREX) 2,000 mg, Oral, Every 12 hours PRN    WEGOVY 0.25 mg, Subcutaneous, Every 7 days       Surgical History:   Past Surgical History:   Procedure Laterality Date    APPENDECTOMY  1995    BREAST BIOPSY Left 2015    bengin w marker ([facility] womens)     BREAST SURGERY  2019    Suction bx left breast    CARDIAC CATHETERIZATION      CHOLECYSTECTOMY  1996    lap baldev with scar revision    COLONOSCOPY W/ BIOPSIES AND POLYPECTOMY      Multi; 2014, 2017    COSMETIC SURGERY  Tummy tu    2016    ESOPHAGOGASTRODUODENOSCOPY  2014    Dr. Duran    EXPLORATORY LAPAROTOMY  1990    Ruptured left ectopic pregnancy    GANGLION CYST EXCISION      right wrist    HERNIA REPAIR  10/2016    x 2  midline    HYSTERECTOMY  2009    Jerel bs/o    HYSTERECTOMY, TOTAL, LAPAROSCOPIC, WITH SALPINGO-OOPHORECTOMY  2009    INCISION AND DRAINAGE OF WOUND      spider bite to chest wall    INCISIONAL HERNIA REPAIR  2009    umbilical    INGUINAL HERNIA REPAIR Bilateral 10/2016    LAPAROSCOPIC LYSIS OF ADHESIONS N/A  05/09/2020    Procedure: LYSIS, ADHESIONS, LAPAROSCOPIC;  Surgeon: Brett Huber MD;  Location: [facility];  Service: General;  Laterality: N/A;    left foot  1982    orthopedic repair; trauma lacertation     LITHOTRIPSY  2006    right kidney stone     CA EXPLORATORY OF ABDOMEN      for ruptured ectopic    SMALL INTESTINE SURGERY  05/2020    Small bowel obstrustion SERAFIN       Family History:   Family History   Problem Relation Age of Onset    Cancer Mother 45        Colon    Hypertension Mother     Miscarriages / Stillbirths Mother         8    Deep vein thrombosis Mother     Lupus Mother     Fibromyalgia Mother     Cancer Father         Colon    Hypertension Father     Heart attack Father     Abnormal EKG Father     Arthritis Maternal Uncle     Cancer Maternal Uncle         Melanoma    Arthritis Maternal Grandfather     Cancer Maternal Grandfather         Lung    Heart disease Paternal Grandmother         SVT CHF    Hypertension Paternal Grandmother     Vision loss Maternal Grandmother         Cataracts       Allergies:   Review of patient's allergies indicates:   Allergen Reactions    Adhesive      Sensitive to Steri-strips     Levaquin [levofloxacin] Other (See Comments)     Tendons leg hurt     Guaifenesin Rash     Rash legs       Tobacco Status:   Tobacco Use: Low Risk  (9/26/2023)    Patient History     Smoking Tobacco Use: Never     Smokeless Tobacco Use: Never     Passive Exposure: Not on file       Sexual Activity:   Social History     Substance and Sexual Activity   Sexual Activity Not Currently    Partners: Male    Birth control/protection: Post-menopausal       Alcohol Use:   Social History     Substance and Sexual Activity   Alcohol Use Yes    Alcohol/week: 1.0 standard drink of alcohol    Types: 1 Glasses of wine per week    Comment: Rarely         Objective       Vitals:    09/26/23 1556   BP: 118/80   BP Location: Right arm   Patient Position: Sitting   Pulse: (!) 58   Resp: 18   SpO2: (!) 94%   Weight:  83.6 kg (184 lb 4.9 oz)   Height: 5' (1.524 m)       Review of Systems   Constitutional:  Positive for fever and malaise/fatigue. Negative for chills.   HENT:  Positive for congestion, ear pain and sore throat.    Respiratory:  Positive for cough and sputum production. Negative for shortness of breath and wheezing.    Cardiovascular:  Negative for chest pain.       Physical Exam  Constitutional:       General: She is not in acute distress.     Appearance: Normal appearance.      Comments: Hoarse, raspy voice   HENT:      Head: Normocephalic and atraumatic.      Right Ear: Tympanic membrane, ear canal and external ear normal.      Left Ear: Tympanic membrane, ear canal and external ear normal.      Nose: Nose normal.      Mouth/Throat:      Mouth: Mucous membranes are moist.      Pharynx: Posterior oropharyngeal erythema present. No oropharyngeal exudate.   Eyes:      Pupils: Pupils are equal, round, and reactive to light.   Cardiovascular:      Rate and Rhythm: Normal rate and regular rhythm.      Heart sounds: Normal heart sounds. No murmur heard.  Pulmonary:      Effort: Pulmonary effort is normal. No respiratory distress.      Breath sounds: Normal breath sounds. No wheezing, rhonchi or rales.   Musculoskeletal:      Cervical back: Normal range of motion.   Skin:     General: Skin is warm.   Neurological:      Mental Status: She is alert and oriented to person, place, and time.   Psychiatric:         Behavior: Behavior normal.           Assessment and Plan:    1. Pharyngitis, unspecified etiology  -     POCT Strep A, Molecular  -     POCT COVID-19 Rapid Screening  -     predniSONE (DELTASONE) 10 MG tablet; Take 1 tablet (10 mg total) by mouth 2 (two) times daily. for 5 days  Dispense: 10 tablet; Refill: 0    2. Bacterial upper respiratory infection    3. Acute bronchitis, bacterial  -     azithromycin (Z-CURTIS) 250 MG tablet; Take 2 tablets by mouth on day 1; Take 1 tablet by mouth on days 2-5  Dispense: 6 tablet;  Refill: 0  -     predniSONE (DELTASONE) 10 MG tablet; Take 1 tablet (10 mg total) by mouth 2 (two) times daily. for 5 days  Dispense: 10 tablet; Refill: 0    4. Acute cough  -     albuterol (PROVENTIL/VENTOLIN HFA) 90 mcg/actuation inhaler; Inhale 2 puffs into the lungs every 6 (six) hours as needed for Wheezing. Rescue  Dispense: 18 g; Refill: 0  -     promethazine-dextromethorphan (PROMETHAZINE-DM) 6.25-15 mg/5 mL Syrp; Take 5 mLs by mouth every 8 (eight) hours as needed (cough).  Dispense: 180 mL; Refill: 1        Visit summary:    Nena Franco presented today for upper respiratory complaints.     Although lung exam is wnl, pt is ill-appearing and has a history of bronchitis --> pneumonia. Treating with regimen that has been effective for her in the past. CXR ordered    Pulse ox taken today during coughing episode. Pt denies shortness of breath and is able to speak in full sentences without pause or difficulty. Patient has a pulse oximeter at home and will monitor at home, if not maintained at 97% she should go to ER. She verbalizes understanding    Patient was instructed to report to ER if symptoms become severe.    Follow up: if symptoms do not improve within 48-72 hours      Dede Lorenzo PA-C    Portions of today's plan discussed via telephone call -- particularly review of O2 saturation    This note was created partially with voice dictation software and is prone to errors. This note has been reviewed by me but some errors are inevitable.

## 2023-09-27 ENCOUNTER — HOSPITAL ENCOUNTER (OUTPATIENT)
Dept: RADIOLOGY | Facility: HOSPITAL | Age: 58
Discharge: HOME | End: 2023-09-27
Payer: COMMERCIAL

## 2023-09-27 ENCOUNTER — TELEPHONE (OUTPATIENT)
Dept: FAMILY MEDICINE | Facility: CLINIC | Age: 58
End: 2023-09-27
Payer: COMMERCIAL

## 2023-09-27 DIAGNOSIS — R05.1 ACUTE COUGH: ICD-10-CM

## 2023-09-27 PROCEDURE — 71046 X-RAY EXAM CHEST 2 VIEWS: CPT | Mod: TC,PO

## 2023-09-27 PROCEDURE — 71046 XR CHEST PA AND LATERAL: ICD-10-PCS | Mod: 26,,, | Performed by: RADIOLOGY

## 2023-09-27 PROCEDURE — 71046 X-RAY EXAM CHEST 2 VIEWS: CPT | Mod: 26,,, | Performed by: RADIOLOGY

## 2023-09-27 NOTE — TELEPHONE ENCOUNTER
Spoke with patient via phone call, reports home pulse ox is >97%. Feeling slightly better. Reviewed results of CXR.     Dede Lorenzo PA-C

## 2023-09-28 RX ORDER — SUCRALFATE 1 G/1
1 TABLET ORAL 2 TIMES DAILY
Qty: 180 TABLET | Refills: 3 | Status: SHIPPED | OUTPATIENT
Start: 2023-09-28

## 2023-10-05 ENCOUNTER — PATIENT MESSAGE (OUTPATIENT)
Dept: FAMILY MEDICINE | Facility: CLINIC | Age: 58
End: 2023-10-05
Payer: COMMERCIAL

## 2023-10-06 NOTE — TELEPHONE ENCOUNTER
Please contact patient and let her know to come back in to the clinic. Today if possible. If no availability today please advise urgent care

## 2023-10-18 ENCOUNTER — PATIENT MESSAGE (OUTPATIENT)
Dept: FAMILY MEDICINE | Facility: CLINIC | Age: 58
End: 2023-10-18
Payer: COMMERCIAL

## 2023-11-08 ENCOUNTER — OFFICE VISIT (OUTPATIENT)
Dept: FAMILY MEDICINE | Facility: CLINIC | Age: 58
End: 2023-11-08
Payer: COMMERCIAL

## 2023-11-08 VITALS
BODY MASS INDEX: 36.21 KG/M2 | DIASTOLIC BLOOD PRESSURE: 72 MMHG | OXYGEN SATURATION: 99 % | WEIGHT: 184.44 LBS | SYSTOLIC BLOOD PRESSURE: 124 MMHG | HEIGHT: 60 IN | HEART RATE: 52 BPM

## 2023-11-08 DIAGNOSIS — Z09 HOSPITAL DISCHARGE FOLLOW-UP: Primary | ICD-10-CM

## 2023-11-08 DIAGNOSIS — J98.9 REACTIVE AIRWAY DISEASE WITHOUT ASTHMA: ICD-10-CM

## 2023-11-08 DIAGNOSIS — K52.9 NONSPECIFIC COLITIS: ICD-10-CM

## 2023-11-08 PROCEDURE — 99999 PR PBB SHADOW E&M-EST. PATIENT-LVL IV: ICD-10-PCS | Mod: PBBFAC,,, | Performed by: INTERNAL MEDICINE

## 2023-11-08 PROCEDURE — 99214 OFFICE O/P EST MOD 30 MIN: CPT | Mod: S$GLB,,, | Performed by: INTERNAL MEDICINE

## 2023-11-08 PROCEDURE — 99999 PR PBB SHADOW E&M-EST. PATIENT-LVL IV: CPT | Mod: PBBFAC,,, | Performed by: INTERNAL MEDICINE

## 2023-11-08 PROCEDURE — 99214 PR OFFICE/OUTPT VISIT, EST, LEVL IV, 30-39 MIN: ICD-10-PCS | Mod: S$GLB,,, | Performed by: INTERNAL MEDICINE

## 2023-11-08 RX ORDER — FLUTICASONE FUROATE AND VILANTEROL 100; 25 UG/1; UG/1
1 POWDER RESPIRATORY (INHALATION) DAILY
Qty: 30 EACH | Refills: 0 | Status: SHIPPED | OUTPATIENT
Start: 2023-11-08 | End: 2024-01-11

## 2023-11-08 NOTE — PROGRESS NOTES
Subjective:       Patient ID: Nena Franco is a 58 y.o. female.    Chief Complaint: Follow-up ([facility] f/u )   Hospital follow-up  Complained of abdominal pain, felt different than her previous episodes of small-bowel obstruction.    Started with dizziness Then progressed to nausea vomiting diarrhea     ED Provider Notes     Encounter Date: 10/16/2023     58 year female patient history of small-bowel obstruction and ulcers with SVT presented to the emergency department with complaints of periumbilical and left lower quadrant abdominal pain.  CT scan shows colitis without acute diverticulitis.  The patient also found to have nonobstructing bilateral kidney stones.  Dr. Mckenzie evaluated the patient independently.  She will be started on Cipro and Flagyl.  She has tolerated Cipro in the past despite allergy to Levaquin.  The patient also complains of some dizziness with nystagmus in the ED. neurologically she was intact.  The patient got relief with meclizine.  She will be sent home with meclizine Zofran.  She will finish 10 days of Cipro and Flagyl.  Follow-up with GI referral has been sent return to the emergency department symptoms worsen.    EXAMINATION:  CT ABDOMEN PELVIS WITH CONTRAST     CLINICAL HISTORY:  LLQ abdominal pain;     TECHNIQUE:  Low dose axial images, sagittal and coronal reformations were obtained from the lung bases to the pubic symphysis following the IV administration of 80 mL of Omnipaque 350.     COMPARISON:  CT 01/07/2023.     FINDINGS:  Abdomen:     - Lower thorax:Normal heart size.  No pericardial effusion.     - Lung bases: No infiltrates and no nodules.     - Liver: No focal mass.     - Gallbladder: Surgically absent.     - Bile Ducts: No evidence of intra or extra hepatic biliary ductal dilation.     - Spleen: Negative.     - Kidneys: Bilateral lobular renal contours with symmetric enhancement.  Nonobstructing left inferior pole stone measures 9 mm.  Nonobstructing right  inferior pole stone measures 3 mm.  Subcentimeter hypoattenuating foci in the left kidney are too small to characterize.  No hydronephrosis or hydroureter.  No stones identified along the course of either ureter.     - Adrenals: Unremarkable.     - Pancreas: No mass, ductal dilatation or peripancreatic fat stranding.     - Retroperitoneum:  No significant adenopathy.     - Vascular: No abdominal aortic aneurysm.  Modest aortic atherosclerosis.     - Abdominal wall:  Postsurgical changes of the anterior abdominal wall compatible with prior hernia repair.     Pelvis:     No pelvic mass, adenopathy, or free fluid.  Urinary bladder is partially distended and appears within normal limits.  Status post hysterectomy.     Bowel/Mesentery/Peritoneum:     Small hiatal hernia.  No evidence of bowel obstruction.  Segment of colonic wall thickening centered at the hepatic flexure without surrounding inflammatory changes.  Few scattered diverticula arising from the ascending colon without evidence of acute diverticulitis.  Status post appendectomy.  No intraperitoneal free air or free fluid.     Bones:  No acute osseous abnormality and no suspicious lytic or blastic lesion.  Mild degenerative changes of the spine.     Impression:     1. Bilateral nonobstructing renal stones.  No hydronephrosis or hydroureter.  2. Segment of colonic wall thickening centered at the hepatic flexure without pericolonic inflammatory changes.  Findings may reflect nondistention or nonspecific colitis.  3. Few scattered diverticula arising from the ascending colon without evidence of acute diverticulitis.  4. Small hiatal hernia.        Electronically signed by: Yared Ashby  Date:                                            10/16/2023    Also complains of chronic cough seen my PA last few months.  Those symptoms have improved but feels like she gets this every fall.    Feels tight chest upper airway on and off a lot of talking makes it worse.   Postnasal drip  ____________________________________________________________________________________________________  ____________________________________________________________________________________________________  Assessment & Plan:  1. Hospital discharge follow-up    2. Nonspecific colitis    3. Reactive airway disease without asthma  - fluticasone furoate-vilanteroL (BREO ELLIPTA) 100-25 mcg/dose diskus inhaler; Inhale 1 puff into the lungs once daily. Controller  Dispense: 30 each; Refill: 0     Hospital discharge follow-up    Nonspecific colitis    Reactive airway disease without asthma  -     fluticasone furoate-vilanteroL (BREO ELLIPTA) 100-25 mcg/dose diskus inhaler; Inhale 1 puff into the lungs once daily. Controller  Dispense: 30 each; Refill: 0        Continue to work on regular exercise, maintain healthy weight, balanced diet. Avoid unhealthy habits: smoking, excessive alcohol intake.     Disclaimer: This note was partly generated using dictation software which may occasionally result in transcription errors  ____________________________________________________________________________________________________  Review of Systems:  Review of Systems   Constitutional:  Negative for appetite change.   HENT:  Negative for nosebleeds.    Eyes:  Negative for pain.   Respiratory:  Negative for choking.    Gastrointestinal:  Negative for blood in stool.   Genitourinary:  Negative for hematuria.   Skin:  Negative for pallor.   Neurological:  Negative for facial asymmetry.   Hematological:  Does not bruise/bleed easily.   Psychiatric/Behavioral:  Negative for confusion.        Objective:     Wt Readings from Last 3 Encounters:   11/08/23 83.7 kg (184 lb 6.6 oz)   10/16/23 83 kg (183 lb)   09/26/23 83.6 kg (184 lb 4.9 oz)     BP Readings from Last 3 Encounters:   11/08/23 124/72   10/16/23 120/78   09/26/23 118/80       Lab Results   Component Value Date    WBC 13.80 (H) 10/16/2023    HGB 15.1 10/16/2023    HCT  45.5 10/16/2023     (H) 10/16/2023     10/16/2023    K 4.0 10/16/2023     10/16/2023    ALT 21 08/28/2023    AST 18 08/28/2023    CO2 25 10/16/2023    CREATININE 0.63 10/16/2023    BUN 18 10/16/2023     (H) 10/16/2023      Hemoglobin A1C   Date Value Ref Range Status   05/08/2023 5.4 <5.7 % of total Hgb Final     Comment:     For the purpose of screening for the presence of  diabetes:     <5.7%       Consistent with the absence of diabetes  5.7-6.4%    Consistent with increased risk for diabetes              (prediabetes)  > or =6.5%  Consistent with diabetes     This assay result is consistent with a decreased risk  of diabetes.     Currently, no consensus exists regarding use of  hemoglobin A1c for diagnosis of diabetes in children.     According to American Diabetes Association (ADA)  guidelines, hemoglobin A1c <7.0% represents optimal  control in non-pregnant diabetic patients. Different  metrics may apply to specific patient populations.   Standards of Medical Care in Diabetes(ADA).         03/15/2022 5.6 <5.7 % of total Hgb Final     Comment:     For the purpose of screening for the presence of  diabetes:     <5.7%       Consistent with the absence of diabetes  5.7-6.4%    Consistent with increased risk for diabetes              (prediabetes)  > or =6.5%  Consistent with diabetes     This assay result is consistent with a decreased risk  of diabetes.     Currently, no consensus exists regarding use of  hemoglobin A1c for diagnosis of diabetes in children.     According to American Diabetes Association (ADA)  guidelines, hemoglobin A1c <7.0% represents optimal  control in non-pregnant diabetic patients. Different  metrics may apply to specific patient populations.   Standards of Medical Care in Diabetes(ADA).           Hemoglobin A1c   Date Value Ref Range Status   08/28/2023 5.9 (H) 4.8 - 5.6 % Final     Comment:              Prediabetes: 5.7 - 6.4           Diabetes: >6.4            Glycemic control for adults with diabetes: <7.0        Lab Results   Component Value Date    TSH 2.420 08/28/2023    TSH 1.20 05/08/2023    TSH 1.53 09/12/2022     Lab Results   Component Value Date    FREET4 1.25 08/09/2010    FREET4 1.27 12/10/2009    FREET4 1.30 01/30/2009     Lab Results   Component Value Date    LDLCALC 119 (H) 08/28/2023    LDLCALC 109 (H) 05/08/2023    LDLCALC 111 (H) 03/07/2022     Lab Results   Component Value Date    TRIG 168 (H) 08/28/2023    TRIG 107 05/08/2023    TRIG 141 03/07/2022            Physical Exam  Constitutional:       Appearance: Normal appearance.   HENT:      Head: Normocephalic and atraumatic.   Eyes:      Extraocular Movements: Extraocular movements intact.      Pupils: Pupils are equal, round, and reactive to light.   Cardiovascular:      Rate and Rhythm: Normal rate.   Pulmonary:      Effort: Pulmonary effort is normal.   Neurological:      Mental Status: She is alert.         Medication List with Changes/Refills   New Medications    FLUTICASONE FUROATE-VILANTEROL (BREO ELLIPTA) 100-25 MCG/DOSE DISKUS INHALER    Inhale 1 puff into the lungs once daily. Controller   Current Medications    ALBUTEROL (PROVENTIL/VENTOLIN HFA) 90 MCG/ACTUATION INHALER    Inhale 2 puffs into the lungs every 6 (six) hours as needed for Wheezing. Rescue    ATENOLOL (TENORMIN) 25 MG TABLET    Take 1 tablet (25 mg total) by mouth once daily.    CYCLOBENZAPRINE (FLEXERIL) 10 MG TABLET    Take 10 mg by mouth nightly as needed (migraine headache).     DILTIAZEM (CARDIZEM CD) 240 MG 24 HR CAPSULE    TAKE 1 CAPSULE BY MOUTH ONCE DAILY.    ERGOCALCIFEROL (ERGOCALCIFEROL) 50,000 UNIT CAP    TAKE 1 CAPSULE (50,000 UNITS TOTAL) BY MOUTH EVERY 7 DAYS. ALTERNATING WEEKS WITH 50,000 UNITS    FLUTICASONE PROPIONATE (FLONASE) 50 MCG/ACTUATION NASAL SPRAY    1 spray by Each Nostril route daily as needed for Allergies.    FLUTICASONE PROPIONATE (FLONASE) 50 MCG/ACTUATION NASAL SPRAY    2 sprays (100 mcg total)  by Each Nostril route once daily.    HYDROCHLOROTHIAZIDE (MICROZIDE) 12.5 MG CAPSULE    Take 12.5 mg by mouth once daily.    IBANDRONATE (BONIVA) 150 MG TABLET    Take 1 tablet (150 mg total) by mouth every 30 days.    LEVOCETIRIZINE (XYZAL) 5 MG TABLET    TAKE 1 TABLET BY MOUTH EVERY DAY    LEVOTHYROXINE (SYNTHROID) 100 MCG TABLET    TAKE 1 TABLET BY MOUTH EVERY MORNING ON AN EMPTY STOMACH    LORAZEPAM (ATIVAN) 0.5 MG TABLET    Take 1 tablet (0.5 mg total) by mouth daily as needed for Anxiety.    MECLIZINE (ANTIVERT) 25 MG TABLET    Take 1 tablet (25 mg total) by mouth 3 (three) times daily as needed for Dizziness.    NAPROXEN (EC NAPROSYN) 500 MG EC TABLET    Take 1 tablet (500 mg total) by mouth daily as needed (migraine headaches).    ONDANSETRON (ZOFRAN-ODT) 8 MG TBDL    Take 1 tablet (8 mg total) by mouth every 6 (six) hours as needed (nausea).    PANTOPRAZOLE (PROTONIX) 40 MG TABLET    Take 1 tablet (40 mg total) by mouth every evening.    POTASSIUM CHLORIDE SA (K-DUR,KLOR-CON M) 10 MEQ TABLET    Take 1 tablet (10 mEq total) by mouth once daily.    SEMAGLUTIDE, WEIGHT LOSS, (WEGOVY) 0.25 MG/0.5 ML PNIJ    Inject 0.25 mg into the skin every 7 days.    SUCRALFATE (CARAFATE) 1 GRAM TABLET    TAKE 1 TABLET BY MOUTH 2 TIMES DAILY.    VALACYCLOVIR (VALTREX) 1000 MG TABLET    Take 2 tablets (2,000 mg total) by mouth every 12 (twelve) hours as needed (for 2 days (repeat as needed for cold sore).

## 2023-11-09 RX ORDER — LEVOCETIRIZINE DIHYDROCHLORIDE 5 MG/1
5 TABLET, FILM COATED ORAL DAILY
Qty: 90 TABLET | Refills: 3 | Status: SHIPPED | OUTPATIENT
Start: 2023-11-09

## 2023-11-09 NOTE — TELEPHONE ENCOUNTER
Care Due:                  Date            Visit Type   Department     Provider  --------------------------------------------------------------------------------                                EP -                              PRIMARY      [facility] FAMILY  Last Visit: 11-      CARE ([facility])   MEDICINE       Patrick Shi  Next Visit: None Scheduled  None         None Found                                                            Last  Test          Frequency    Reason                     Performed    Due Date  --------------------------------------------------------------------------------    Phosphate...  12 months..  ibandronate..............  Not Found    Overdue    [employer] Embedded Care Due Messages. Reference number: [id number].   11/09/2023 2:19:11 AM CST

## 2023-11-09 NOTE — TELEPHONE ENCOUNTER
Refill Decision Note   Nena Farnco  is requesting a refill authorization.  Brief Assessment and Rationale for Refill:  Approve     Medication Therapy Plan:         Comments:   Comments:     No Care Gaps recommended.     Note composed:8:45 AM 11/09/2023

## 2023-11-20 ENCOUNTER — PATIENT MESSAGE (OUTPATIENT)
Dept: FAMILY MEDICINE | Facility: CLINIC | Age: 58
End: 2023-11-20
Payer: COMMERCIAL

## 2023-11-20 DIAGNOSIS — F41.9 ANXIETY: ICD-10-CM

## 2023-11-21 RX ORDER — KETOROLAC TROMETHAMINE 10 MG/1
10 TABLET, FILM COATED ORAL EVERY 6 HOURS
Qty: 20 TABLET | Refills: 0 | Status: SHIPPED | OUTPATIENT
Start: 2023-11-21 | End: 2023-11-26

## 2023-11-21 RX ORDER — VALACYCLOVIR HYDROCHLORIDE 1 G/1
2000 TABLET, FILM COATED ORAL EVERY 12 HOURS PRN
Qty: 30 TABLET | Refills: 0 | Status: SHIPPED | OUTPATIENT
Start: 2023-11-21

## 2023-11-21 RX ORDER — LORAZEPAM 0.5 MG/1
0.5 TABLET ORAL DAILY PRN
Qty: 15 TABLET | Refills: 0 | Status: SHIPPED | OUTPATIENT
Start: 2023-11-21

## 2023-11-21 NOTE — TELEPHONE ENCOUNTER
No care due was identified.  [facility] Embedded Care Due Messages. Reference number: [id number].   11/21/2023 8:05:50 AM CST

## 2023-12-30 PROBLEM — E87.6 HYPOKALEMIA: Status: ACTIVE | Noted: 2023-12-30

## 2024-01-03 ENCOUNTER — PATIENT MESSAGE (OUTPATIENT)
Dept: FAMILY MEDICINE | Facility: CLINIC | Age: 59
End: 2024-01-03
Payer: COMMERCIAL

## 2024-01-10 DIAGNOSIS — J98.9 REACTIVE AIRWAY DISEASE WITHOUT ASTHMA: ICD-10-CM

## 2024-01-10 NOTE — TELEPHONE ENCOUNTER
Please approve for fluticasone furoate-vilanteroL (BREO)     Last OV 11/8/23  Last refill date 12/23/23  30x0r  Next appt NA

## 2024-01-10 NOTE — TELEPHONE ENCOUNTER
Refill Routing Note   Medication(s) are not appropriate for processing by [facility] for the following reason(s):        New or recently adjusted medication  ED/Hospital Visit since last OV with provider    [facility] action(s):  Defer     Requires labs : Yes             Appointments  past 12m or future 3m with PCP    Date Provider   Last Visit   11/8/2023 Patrick Shi MD   Next Visit   Visit date not found Patrick Shi MD   ED visits in past 90 days: 1        Note composed:5:55 PM 01/10/2024

## 2024-01-10 NOTE — TELEPHONE ENCOUNTER
Care Due:                  Date            Visit Type   Department     Provider  --------------------------------------------------------------------------------                                EP -                              PRIMARY      [facility] FAMILY  Last Visit: 11-      CARE ([facility])   MEDICINE       Patrick Shi  Next Visit: None Scheduled  None         None Found                                                            Last  Test          Frequency    Reason                     Performed    Due Date  --------------------------------------------------------------------------------    HBA1C.......  6 months...  semaglutide,.............  08- 02-    Phosphate...  12 months..  ibandronate..............  Not Found    Overdue    [employer] Embedded Care Due Messages. Reference number: [id number].   1/10/2024 3:43:14 PM CST

## 2024-01-11 RX ORDER — FLUTICASONE FUROATE AND VILANTEROL 100; 25 UG/1; UG/1
1 POWDER RESPIRATORY (INHALATION)
Qty: 60 EACH | Refills: 3 | Status: SHIPPED | OUTPATIENT
Start: 2024-01-11

## 2024-03-14 ENCOUNTER — PATIENT MESSAGE (OUTPATIENT)
Dept: FAMILY MEDICINE | Facility: CLINIC | Age: 59
End: 2024-03-14
Payer: COMMERCIAL

## 2024-03-14 DIAGNOSIS — E78.1 HYPERTRIGLYCERIDEMIA: ICD-10-CM

## 2024-03-14 DIAGNOSIS — R73.03 PRE-DIABETES: ICD-10-CM

## 2024-03-14 DIAGNOSIS — E03.9 HYPOTHYROIDISM, UNSPECIFIED TYPE: ICD-10-CM

## 2024-03-14 DIAGNOSIS — Z00.00 WELLNESS EXAMINATION: Primary | ICD-10-CM

## 2024-03-17 NOTE — TELEPHONE ENCOUNTER
Care Due:                  Date            Visit Type   Department     Provider  --------------------------------------------------------------------------------                                EP -                              PRIMARY      [facility] FAMILY  Last Visit: 11-      CARE ([facility])   MEDICINE       Patrick Shi                              MYCHART                              FOLLOWUP/OF  [facility]  Next Visit: 07-      [facility]ICE VISIT   MEDICINE       Patrick Shi                                                            Last  Test          Frequency    Reason                     Performed    Due Date  --------------------------------------------------------------------------------    Phosphate...  12 months..  ibandronate..............  Not Found    Overdue    Vitamin D...  12 months..  ergocalciferol,            05-   05-                             ibandronate..............    [employer] Embedded Care Due Messages. Reference number: [id number].   3/17/2024 7:04:20 AM CDT

## 2024-03-18 RX ORDER — NAPROXEN 500 MG/1
TABLET, DELAYED RELEASE ORAL
Qty: 90 TABLET | Refills: 1 | Status: SHIPPED | OUTPATIENT
Start: 2024-03-18

## 2024-03-18 NOTE — TELEPHONE ENCOUNTER
Refill Routing Note   Medication(s) are not appropriate for processing by [facility] for the following reason(s):        Outside of protocol    [facility] action(s):  Route     Requires labs : Yes             Appointments  past 12m or future 3m with PCP    Date Provider   Last Visit   11/8/2023 Patrick Shi MD   Next Visit   7/10/2024 Patrick Shi MD   ED visits in past 90 days: 0        Note composed:8:38 AM 03/18/2024

## 2024-04-13 LAB
ALBUMIN SERPL-MCNC: 4.6 G/DL (ref 3.8–4.9)
ALBUMIN/GLOB SERPL: 1.7 {RATIO} (ref 1.2–2.2)
ALP SERPL-CCNC: 102 IU/L (ref 44–121)
ALT SERPL-CCNC: 24 IU/L (ref 0–32)
AST SERPL-CCNC: 22 IU/L (ref 0–40)
BILIRUB SERPL-MCNC: 0.5 MG/DL (ref 0–1.2)
BUN SERPL-MCNC: 16 MG/DL (ref 8–27)
BUN/CREAT SERPL: 23 (ref 12–28)
CALCIUM SERPL-MCNC: 10.1 MG/DL (ref 8.7–10.3)
CHLORIDE SERPL-SCNC: 102 MMOL/L (ref 96–106)
CHOLEST SERPL-MCNC: 259 MG/DL (ref 100–199)
CO2 SERPL-SCNC: 25 MMOL/L (ref 20–29)
CREAT SERPL-MCNC: 0.71 MG/DL (ref 0.57–1)
ERYTHROCYTE [DISTWIDTH] IN BLOOD BY AUTOMATED COUNT: 12.9 % (ref 11.7–15.4)
EST. GFR  (NO RACE VARIABLE): 97 ML/MIN/1.73
GLOBULIN SER CALC-MCNC: 2.7 G/DL (ref 1.5–4.5)
GLUCOSE SERPL-MCNC: 93 MG/DL (ref 70–99)
HBA1C MFR BLD: 6 % (ref 4.8–5.6)
HCT VFR BLD AUTO: 42.6 % (ref 34–46.6)
HDLC SERPL-MCNC: 66 MG/DL
HGB BLD-MCNC: 14 G/DL (ref 11.1–15.9)
LDLC SERPL CALC-MCNC: 167 MG/DL (ref 0–99)
MCH RBC QN AUTO: 29.6 PG (ref 26.6–33)
MCHC RBC AUTO-ENTMCNC: 32.9 G/DL (ref 31.5–35.7)
MCV RBC AUTO: 90 FL (ref 79–97)
PLATELET # BLD AUTO: 419 X10E3/UL (ref 150–450)
POTASSIUM SERPL-SCNC: 4.6 MMOL/L (ref 3.5–5.2)
PROT SERPL-MCNC: 7.3 G/DL (ref 6–8.5)
RBC # BLD AUTO: 4.73 X10E6/UL (ref 3.77–5.28)
SODIUM SERPL-SCNC: 142 MMOL/L (ref 134–144)
T4 FREE SERPL-MCNC: 1.6 NG/DL (ref 0.82–1.77)
TRIGL SERPL-MCNC: 143 MG/DL (ref 0–149)
TSH SERPL DL<=0.005 MIU/L-ACNC: 1.99 UIU/ML (ref 0.45–4.5)
VLDLC SERPL CALC-MCNC: 26 MG/DL (ref 5–40)
WBC # BLD AUTO: 9.2 X10E3/UL (ref 3.4–10.8)

## 2024-04-25 ENCOUNTER — OFFICE VISIT (OUTPATIENT)
Dept: FAMILY MEDICINE | Facility: CLINIC | Age: 59
End: 2024-04-25
Payer: COMMERCIAL

## 2024-04-25 VITALS
DIASTOLIC BLOOD PRESSURE: 86 MMHG | HEART RATE: 76 BPM | HEIGHT: 60 IN | BODY MASS INDEX: 36.18 KG/M2 | OXYGEN SATURATION: 98 % | WEIGHT: 184.31 LBS | SYSTOLIC BLOOD PRESSURE: 134 MMHG

## 2024-04-25 DIAGNOSIS — E87.6 LOW BLOOD POTASSIUM: ICD-10-CM

## 2024-04-25 DIAGNOSIS — K56.50 INTESTINAL ADHESIONS WITH OBSTRUCTION, UNSPECIFIED WHETHER PARTIAL OR COMPLETE: Primary | ICD-10-CM

## 2024-04-25 DIAGNOSIS — E78.2 MIXED HYPERLIPIDEMIA: ICD-10-CM

## 2024-04-25 DIAGNOSIS — M81.0 AGE-RELATED OSTEOPOROSIS WITHOUT CURRENT PATHOLOGICAL FRACTURE: ICD-10-CM

## 2024-04-25 DIAGNOSIS — R06.2 WHEEZING: ICD-10-CM

## 2024-04-25 DIAGNOSIS — E55.9 VITAMIN D DEFICIENCY: ICD-10-CM

## 2024-04-25 DIAGNOSIS — Z00.00 WELLNESS EXAMINATION: ICD-10-CM

## 2024-04-25 DIAGNOSIS — Z78.0 MENOPAUSE: ICD-10-CM

## 2024-04-25 DIAGNOSIS — I10 ESSENTIAL HYPERTENSION: ICD-10-CM

## 2024-04-25 DIAGNOSIS — E03.9 HYPOTHYROIDISM, UNSPECIFIED TYPE: ICD-10-CM

## 2024-04-25 DIAGNOSIS — R73.03 PRE-DIABETES: ICD-10-CM

## 2024-04-25 PROBLEM — Z86.018: Status: ACTIVE | Noted: 2024-04-25

## 2024-04-25 PROBLEM — Z87.19 HISTORY OF SMALL BOWEL OBSTRUCTION: Status: ACTIVE | Noted: 2024-04-25

## 2024-04-25 PROBLEM — Z80.0 FAMILY HISTORY OF COLON CANCER: Status: ACTIVE | Noted: 2024-04-25

## 2024-04-25 PROBLEM — K30 FUNCTIONAL DYSPEPSIA: Status: ACTIVE | Noted: 2024-04-25

## 2024-04-25 PROBLEM — Z87.11 HISTORY OF GASTRIC ULCER: Status: ACTIVE | Noted: 2024-04-25

## 2024-04-25 PROBLEM — Z86.010 PERSONAL HISTORY OF COLONIC POLYPS: Status: ACTIVE | Noted: 2024-04-25

## 2024-04-25 PROBLEM — Z86.0100 PERSONAL HISTORY OF COLONIC POLYPS: Status: ACTIVE | Noted: 2024-04-25

## 2024-04-25 PROBLEM — K59.04 CHRONIC IDIOPATHIC CONSTIPATION: Status: ACTIVE | Noted: 2024-04-25

## 2024-04-25 PROCEDURE — 99214 OFFICE O/P EST MOD 30 MIN: CPT | Mod: S$GLB,,, | Performed by: INTERNAL MEDICINE

## 2024-04-25 PROCEDURE — 99999 PR PBB SHADOW E&M-EST. PATIENT-LVL IV: CPT | Mod: PBBFAC,,, | Performed by: INTERNAL MEDICINE

## 2024-04-25 RX ORDER — ERGOCALCIFEROL 1.25 MG/1
50000 CAPSULE ORAL
Qty: 12 CAPSULE | Refills: 2 | Status: SHIPPED | OUTPATIENT
Start: 2024-04-25

## 2024-04-25 RX ORDER — POTASSIUM CHLORIDE 750 MG/1
10 TABLET, EXTENDED RELEASE ORAL DAILY
Qty: 90 TABLET | Refills: 2 | Status: SHIPPED | OUTPATIENT
Start: 2024-04-25

## 2024-04-25 RX ORDER — LEVOTHYROXINE SODIUM 100 UG/1
TABLET ORAL
Qty: 90 TABLET | Refills: 3 | Status: SHIPPED | OUTPATIENT
Start: 2024-04-25

## 2024-04-25 RX ORDER — IBANDRONATE SODIUM 150 MG/1
150 TABLET, FILM COATED ORAL
Qty: 3 TABLET | Refills: 3 | Status: SHIPPED | OUTPATIENT
Start: 2024-04-25

## 2024-04-25 NOTE — PROGRESS NOTES
Subjective:       Patient ID: Nena Franco is a 59 y.o. female.    Chief Complaint: Follow-up   HPI    Gyn: [facility]   Dr Lowe -he also writes compounded Ozempic   MMG:  Womens BR 8/23  Dexa:  Osteoporosis  2018 L -3.3 H -2.3 //2022 L-3.0 & H -2.8 // Rx Boniva approx 2019 started   Colonoscopy:   12/22 + adenoma r/c 5 yr (changed to Dr Mckeon)   Immunizations: Flu: D Tdap:  2022 Shingles: defer  Covid: defers   Smoker:  Never   Eye: [facility]   Derm:   Note N     F/u   Recurrent Bowel Obstructions- following low residual diet.   Has another bowel obstruction Dec 2023 - change to Dr Mckeon GI   Overgrowth bowel testing was negative at office   Dec 2023 [facility] Hospital Course:   Admitted on 12/29 with partial SBO. She underwent NGT decompression and a GG challenge was performed on Hospital [facility]. She subsequently had multiple Bms after the GG challenge. She was tolerating diet and having bowel function on 1/1/24 and deemed stable for discharge.     Insurance not covering Breo or Albuterol this yr       HTN/Heart palpitations/ Hx sustain SVT, coronary spasm: controlled Rx Cardizem 240, Atenolol 25,   Off Hctz 12.5 ,  Losartan                Prev Tx Digoxin  (3-4 months then stopped)   mgmt cardio Dr Isa Holter 2023       Mixed HLD: uncontrolled. , Tg 168 -143.      Hypothyroid; controlled Rx Levo 100  TPO TB antibody negative.     Chronic GERD w erosive esophagitis: controlled  Rx Protonix 40 daily, Carafate 1 1 daily.  Mgmt Dr Woo  GI              EGD dilated, 2023       Glucose intolerance:  increased A1c 5.9, 6.0 / G 99. 93               Recommend low carb diet under 100 grams daily      Migraines:  Complex hemiplegic.  Stable Improved with CBD oil OTC.   prev Rx Botox Dr. Paniagua.     (SE: Imitrex: worse)      Vitamin-D deficiency: controlled  Supplement O2 vitamin-D     Metabolic syndrome BMI 37 & 189, 36/186, 35/184      HSV 1-2 recurrent: controlled  Rx prn + buttock.  Valtrex     Allergies/reactive airway disease:  + symptoms early spring/October.  Rx daily H1 nasal spray, p.r.n. Albuterol, Breo   Prev Advair     Lower back pain L>R: cyclic Rx patch OTC, prn Flexeril.      ____________________________________________________________________________________________________  Assessment & Plan:  1. Pre-diabetes  - Comprehensive Metabolic Panel; Future  - Hemoglobin A1C; Future  - Comprehensive Metabolic Panel  - Hemoglobin A1C    2. Intestinal adhesions with obstruction, unspecified whether partial or complete    3. Age-related osteoporosis without current pathological fracture  - DXA Bone Density Axial Skeleton 1 or more sites; Future  - ibandronate (BONIVA) 150 mg tablet; Take 1 tablet (150 mg total) by mouth every 30 days.  Dispense: 3 tablet; Refill: 3  - Vitamin D; Future    4. Mixed hyperlipidemia  - Lipid Panel; Future  - Lipid Panel    5. Wheezing  - albuterol-budesonide (AIRSUPRA) 90-80 mcg/actuation; Inhale 2 puffs into the lungs every 4 to 6 hours as needed (sob).  Dispense: 10.7 g; Refill: 0    6. Essential hypertension    7. Hypothyroidism, unspecified type  - TSH; Future  - TSH  - levothyroxine (SYNTHROID) 100 MCG tablet; TAKE 1 TABLET BY MOUTH EVERY MORNING ON AN EMPTY STOMACH  Dispense: 90 tablet; Refill: 3    8. Menopause  - DXA Bone Density Axial Skeleton 1 or more sites; Future    9. Wellness examination  - Comprehensive Metabolic Panel; Future  - Hemoglobin A1C; Future  - Lipid Panel; Future  - TSH; Future  - Comprehensive Metabolic Panel  - Hemoglobin A1C  - Lipid Panel  - TSH    10. Low blood potassium  - potassium chloride SA (K-DUR,KLOR-CON M) 10 MEQ tablet; Take 1 tablet (10 mEq total) by mouth once daily.  Dispense: 90 tablet; Refill: 2    11. Vitamin D deficiency  - ergocalciferol (ERGOCALCIFEROL) 50,000 unit Cap; Take 1 capsule (50,000 Units total) by mouth every 7 days.  Dispense: 12 capsule; Refill: 2  - Vitamin D; Future     Intestinal adhesions with  obstruction, unspecified whether partial or complete    Pre-diabetes  -     Comprehensive Metabolic Panel; Future; Expected date: 04/25/2024  -     Hemoglobin A1C; Future; Expected date: 04/25/2024    Age-related osteoporosis without current pathological fracture  -     DXA Bone Density Axial Skeleton 1 or more sites; Future; Expected date: 04/25/2024  -     ibandronate (BONIVA) 150 mg tablet; Take 1 tablet (150 mg total) by mouth every 30 days.  Dispense: 3 tablet; Refill: 3  -     Vitamin D; Future; Expected date: 04/25/2024    Mixed hyperlipidemia  Comments:  diet change still elevated next labs recommend lipid lowering Rx  Orders:  -     Lipid Panel; Future; Expected date: 04/25/2024    Wheezing  -     albuterol-budesonide (AIRSUPRA) 90-80 mcg/actuation; Inhale 2 puffs into the lungs every 4 to 6 hours as needed (sob).  Dispense: 10.7 g; Refill: 0    Essential hypertension    Hypothyroidism, unspecified type  -     TSH; Future; Expected date: 04/25/2024  -     levothyroxine (SYNTHROID) 100 MCG tablet; TAKE 1 TABLET BY MOUTH EVERY MORNING ON AN EMPTY STOMACH  Dispense: 90 tablet; Refill: 3    Menopause  -     DXA Bone Density Axial Skeleton 1 or more sites; Future; Expected date: 04/25/2024    Wellness examination  -     Comprehensive Metabolic Panel; Future; Expected date: 04/25/2024  -     Hemoglobin A1C; Future; Expected date: 04/25/2024  -     Lipid Panel; Future; Expected date: 04/25/2024  -     TSH; Future; Expected date: 04/25/2024    Low blood potassium  -     potassium chloride SA (K-DUR,KLOR-CON M) 10 MEQ tablet; Take 1 tablet (10 mEq total) by mouth once daily.  Dispense: 90 tablet; Refill: 2    Vitamin D deficiency  -     ergocalciferol (ERGOCALCIFEROL) 50,000 unit Cap; Take 1 capsule (50,000 Units total) by mouth every 7 days.  Dispense: 12 capsule; Refill: 2  -     Vitamin D; Future; Expected date: 04/25/2024        Continue to work on regular exercise, maintain healthy weight, balanced diet. Avoid  unhealthy habits: smoking, excessive alcohol intake.     Disclaimer: This note was partly generated using dictation software which may occasionally result in transcription errors  ____________________________________________________________________________________________________  Review of Systems:  Review of Systems   Constitutional:  Negative for appetite change.   HENT:  Negative for nosebleeds.    Eyes:  Negative for pain.   Respiratory:  Negative for choking.    Cardiovascular:  Negative for chest pain.   Gastrointestinal:  Negative for blood in stool.   Genitourinary:  Negative for hematuria.   Musculoskeletal:  Negative for joint swelling.   Skin:  Negative for pallor.   Neurological:  Negative for facial asymmetry.   Hematological:  Does not bruise/bleed easily.   Psychiatric/Behavioral:  Negative for confusion.        Objective:     Wt Readings from Last 3 Encounters:   04/25/24 83.6 kg (184 lb 4.9 oz)   03/11/24 82.1 kg (181 lb 1.6 oz)   12/29/23 81.4 kg (179 lb 7.3 oz)     BP Readings from Last 3 Encounters:   04/25/24 134/86   03/11/24 136/88   01/01/24 111/77       Lab Results   Component Value Date    WBC 9.2 04/12/2024    HGB 14.0 04/12/2024    HCT 42.6 04/12/2024     04/12/2024     04/12/2024    K 4.6 04/12/2024     04/12/2024    ALT 24 04/12/2024    AST 22 04/12/2024    CO2 25 04/12/2024    CREATININE 0.71 04/12/2024    BUN 16 04/12/2024    GLU 93 04/12/2024      Hemoglobin A1C   Date Value Ref Range Status   05/08/2023 5.4 <5.7 % of total Hgb Final     Comment:     For the purpose of screening for the presence of  diabetes:     <5.7%       Consistent with the absence of diabetes  5.7-6.4%    Consistent with increased risk for diabetes              (prediabetes)  > or =6.5%  Consistent with diabetes     This assay result is consistent with a decreased risk  of diabetes.     Currently, no consensus exists regarding use of  hemoglobin A1c for diagnosis of diabetes in children.      According to American Diabetes Association (ADA)  guidelines, hemoglobin A1c <7.0% represents optimal  control in non-pregnant diabetic patients. Different  metrics may apply to specific patient populations.   Standards of Medical Care in Diabetes(ADA).         03/15/2022 5.6 <5.7 % of total Hgb Final     Comment:     For the purpose of screening for the presence of  diabetes:     <5.7%       Consistent with the absence of diabetes  5.7-6.4%    Consistent with increased risk for diabetes              (prediabetes)  > or =6.5%  Consistent with diabetes     This assay result is consistent with a decreased risk  of diabetes.     Currently, no consensus exists regarding use of  hemoglobin A1c for diagnosis of diabetes in children.     According to American Diabetes Association (ADA)  guidelines, hemoglobin A1c <7.0% represents optimal  control in non-pregnant diabetic patients. Different  metrics may apply to specific patient populations.   Standards of Medical Care in Diabetes(ADA).           Hemoglobin A1c   Date Value Ref Range Status   04/12/2024 6.0 (H) 4.8 - 5.6 % Final     Comment:              Prediabetes: 5.7 - 6.4           Diabetes: >6.4           Glycemic control for adults with diabetes: <7.0     08/28/2023 5.9 (H) 4.8 - 5.6 % Final     Comment:              Prediabetes: 5.7 - 6.4           Diabetes: >6.4           Glycemic control for adults with diabetes: <7.0        Lab Results   Component Value Date    TSH 1.990 04/12/2024    TSH 2.420 08/28/2023    TSH 1.20 05/08/2023     Lab Results   Component Value Date    FREET4 1.25 08/09/2010    FREET4 1.27 12/10/2009    FREET4 1.30 01/30/2009     Lab Results   Component Value Date    LDLCALC 167 (H) 04/12/2024    LDLCALC 119 (H) 08/28/2023    LDLCALC 109 (H) 05/08/2023     Lab Results   Component Value Date    TRIG 143 04/12/2024    TRIG 168 (H) 08/28/2023    TRIG 107 05/08/2023            Physical Exam  Constitutional:       Appearance: Normal appearance.    HENT:      Head: Normocephalic and atraumatic.   Eyes:      Extraocular Movements: Extraocular movements intact.      Pupils: Pupils are equal, round, and reactive to light.   Cardiovascular:      Rate and Rhythm: Normal rate.   Pulmonary:      Effort: Pulmonary effort is normal.   Neurological:      Mental Status: She is alert and oriented to person, place, and time.   Psychiatric:         Mood and Affect: Mood normal.         Medication List with Changes/Refills   New Medications    ALBUTEROL-BUDESONIDE (AIRSUPRA) 90-80 MCG/ACTUATION    Inhale 2 puffs into the lungs every 4 to 6 hours as needed (sob).   Current Medications    ATENOLOL (TENORMIN) 25 MG TABLET    Take 0.5 tablets (12.5 mg total) by mouth every morning.    CYCLOBENZAPRINE (FLEXERIL) 10 MG TABLET    Take 10 mg by mouth nightly as needed (migraine headache).     DILTIAZEM (CARDIZEM CD) 240 MG 24 HR CAPSULE    Take 1 capsule (240 mg total) by mouth nightly.    EC-NAPROXEN 500 MG EC TABLET    TAKE 1 TABLET (500 MG TOTAL) BY MOUTH DAILY AS NEEDED (MIGRAINE HEADACHES).    FLUTICASONE PROPIONATE (FLONASE) 50 MCG/ACTUATION NASAL SPRAY    1 spray by Each Nostril route daily as needed for Allergies.    LEVOCETIRIZINE (XYZAL) 5 MG TABLET    Take 1 tablet (5 mg total) by mouth once daily.    LORAZEPAM (ATIVAN) 0.5 MG TABLET    Take 1 tablet (0.5 mg total) by mouth daily as needed for Anxiety.    MECLIZINE (ANTIVERT) 25 MG TABLET    Take 1 tablet (25 mg total) by mouth 3 (three) times daily as needed for Dizziness.    MELATONIN ORAL    Take by mouth.    ONDANSETRON (ZOFRAN-ODT) 8 MG TBDL    Take 1 tablet (8 mg total) by mouth every 6 (six) hours as needed (nausea).    PANTOPRAZOLE (PROTONIX) 40 MG TABLET    Take 1 tablet (40 mg total) by mouth every evening.    SUCRALFATE (CARAFATE) 1 GRAM TABLET    TAKE 1 TABLET BY MOUTH 2 TIMES DAILY.    VALACYCLOVIR (VALTREX) 1000 MG TABLET    Take 2 tablets (2,000 mg total) by mouth every 12 (twelve) hours as needed (for 2  days (repeat as needed for cold sore).   Changed and/or Refilled Medications    Modified Medication Previous Medication    ERGOCALCIFEROL (ERGOCALCIFEROL) 50,000 UNIT CAP ergocalciferol (ERGOCALCIFEROL) 50,000 unit Cap       Take 1 capsule (50,000 Units total) by mouth every 7 days.    TAKE 1 CAPSULE (50,000 UNITS TOTAL) BY MOUTH EVERY 7 DAYS. ALTERNATING WEEKS WITH 50,000 UNITS    IBANDRONATE (BONIVA) 150 MG TABLET ibandronate (BONIVA) 150 mg tablet       Take 1 tablet (150 mg total) by mouth every 30 days.    Take 1 tablet (150 mg total) by mouth every 30 days.    LEVOTHYROXINE (SYNTHROID) 100 MCG TABLET levothyroxine (SYNTHROID) 100 MCG tablet       TAKE 1 TABLET BY MOUTH EVERY MORNING ON AN EMPTY STOMACH    TAKE 1 TABLET BY MOUTH EVERY MORNING ON AN EMPTY STOMACH    POTASSIUM CHLORIDE SA (K-DUR,KLOR-CON M) 10 MEQ TABLET potassium chloride SA (K-DUR,KLOR-CON M) 10 MEQ tablet       Take 1 tablet (10 mEq total) by mouth once daily.    TAKE 1 TABLET BY MOUTH ONCE DAILY   Discontinued Medications    FLUTICASONE FUROATE-VILANTEROL (BREO) 100-25 MCG/DOSE DISKUS INHALER    INHALE 1 PUFF INTO THE LUNGS ONCE DAILY

## 2024-05-10 ENCOUNTER — PATIENT MESSAGE (OUTPATIENT)
Dept: FAMILY MEDICINE | Facility: CLINIC | Age: 59
End: 2024-05-10
Payer: COMMERCIAL

## 2024-05-10 ENCOUNTER — E-VISIT (OUTPATIENT)
Dept: FAMILY MEDICINE | Facility: CLINIC | Age: 59
End: 2024-05-10
Payer: COMMERCIAL

## 2024-05-10 DIAGNOSIS — N39.0 URINARY TRACT INFECTION WITHOUT HEMATURIA, SITE UNSPECIFIED: Primary | ICD-10-CM

## 2024-05-10 PROCEDURE — 99422 OL DIG E/M SVC 11-20 MIN: CPT | Mod: ,,, | Performed by: STUDENT IN AN ORGANIZED HEALTH CARE EDUCATION/TRAINING PROGRAM

## 2024-05-10 RX ORDER — VALACYCLOVIR HYDROCHLORIDE 1 G/1
2000 TABLET, FILM COATED ORAL EVERY 12 HOURS PRN
Qty: 30 TABLET | Refills: 11 | Status: SHIPPED | OUTPATIENT
Start: 2024-05-10

## 2024-05-10 RX ORDER — PHENAZOPYRIDINE HYDROCHLORIDE 200 MG/1
200 TABLET, FILM COATED ORAL 3 TIMES DAILY PRN
Qty: 9 TABLET | Refills: 0 | Status: SHIPPED | OUTPATIENT
Start: 2024-05-10 | End: 2024-05-13

## 2024-05-10 RX ORDER — NITROFURANTOIN 25; 75 MG/1; MG/1
100 CAPSULE ORAL 2 TIMES DAILY
Qty: 10 CAPSULE | Refills: 0 | Status: SHIPPED | OUTPATIENT
Start: 2024-05-10 | End: 2024-05-15

## 2024-05-10 NOTE — PROGRESS NOTES
Patient ID: Nena Franco is a 59 y.o. female.    Chief Complaint: Urinary Tract Infection (Entered automatically based on patient selection in Patient Portal.)          274}  The patient initiated a request through [employer] on 5/10/2024 for evaluation and management with a chief complaint of Urinary Tract Infection (Entered automatically based on patient selection in Patient Portal.)     I evaluated the questionnaire submission on 05/10/2024 .    Total Time (in minutes): 11     [facility] Peq Evisit Uti Questionnaire    5/10/2024  1:14 PM CDT - Filed by Patient   Do you agree to participate in an E-Visit? Yes   If you have any of the following problems, please present to your local ER or call 911:  I acknowledge   Choose the state of your primary residence [place]   What is the main issue you would like addressed today? Uti   Are you able to take your vital signs? Yes   Systolic Blood Pressure:    Diastolic Blood Pressure:    Weight: 180   Height: 60   Pulse: 62   Temperature: 99.8   Respiration rate: 20   Pulse Oxygen: 99   What symptoms do you currently have? Pain while passing urine;  Change in urine appearance or smell   When did your symptoms first appear? 5/4/2024   List what you have done or taken to help your symptoms. Cut caffeine, increase water, tylenol   Please indicate whether you have had the following symptoms during the past 24 hours     Urgent urination (a sudden and uncontrollable urge to urinate) Severe   Frequent urination of small amounts of urine (going to the toilet very often) Moderate   Burning pain when urinating Severe   Incomplete bladder emptying (still feel like you need to urinate again after urination) Moderate   Pain not associated with urination in the lower abdomen below the belly button) None   What does your urine look like? Cloudy   Blood seen in the urine None   Flank pain (pain in one or both sides of the lower back) Mild   Abnormal Vaginal Discharge (abnormal amount, color  and/or odor) Severe   Discharge from the urethra (urinary opening) without urination Mild   High body temperature/fever? Yes, mild-99.6 F-100.2 F   Please rate how much discomfort you have experience because of the symptoms in the past 24 hours: Moderate   Please indicate how the symptoms have interfered with your every day activities/work in the past 24 hours: Moderate   Please indicate how these symptoms have interfered with your social activities (visiting people, meeting with friends, etc.) in the past 24 hours? Moderate   Are you a diabetic? No   Please indicate whether you have the following at the time of completion of this questionnaire: None of the above   Provide any additional information you feel is important.    Please attach any relevant images or files (if you have performed a home test for UTI, please submit a photo of results)           Active Problem List with Overview Notes    Diagnosis Date Noted    Chronic idiopathic constipation 04/25/2024    Family history of colon cancer 04/25/2024    Functional dyspepsia 04/25/2024    H/O benign neoplasm of adrenal gland 04/25/2024    History of gastric ulcer 04/25/2024    History of small bowel obstruction 04/25/2024    Personal history of colonic polyps 04/25/2024    Hypokalemia 12/30/2023    Pre-diabetes 09/22/2023    Hypertriglyceridemia 09/22/2023     add fiber daily       Elevated liver enzymes 09/22/2023     resolved       Struvite kidney stones 09/18/2023    Mass of upper inner quadrant of left breast 08/18/2023    Allergic rhinitis 07/26/2023    Decreased ROM of left shoulder 05/16/2023    Weakness of left upper extremity 05/16/2023    Vomiting 01/12/2023    Abdominal distention 01/12/2023    PSVT (paroxysmal supraventricular tachycardia) 01/09/2023    Syncope and collapse 12/05/2022    Glucose intolerance (impaired glucose tolerance) 08/22/2022    Hemiplegic migraine without status migrainosus, not intractable 10/21/2021     Left side       History  of pituitary adenoma 10/21/2021    Obesity (BMI 30-39.9) 10/21/2021    History of supraventricular tachycardia 10/21/2021    Coronary artery spasm 10/21/2021    Vitamin D deficiency 08/23/2021    Hypothyroidism 08/23/2021    Age-related osteoporosis without current pathological fracture 08/23/2021    Menopause 08/23/2021    Mixed hyperlipidemia 08/23/2021    Hiatal hernia with GERD and esophagitis 08/23/2021    Colon adenoma 08/23/2021    HSV infection 04/05/2021     1 & 2       Environmental and seasonal allergies 04/05/2021    Essential hypertension 03/07/2021    Migraine without status migrainosus, not intractable 03/07/2021    SBO (small bowel obstruction) 05/09/2020      Recent Labs Obtained:  Lab Results   Component Value Date    WBC 9.2 04/12/2024    HGB 14.0 04/12/2024    HCT 42.6 04/12/2024    MCV 90 04/12/2024     04/12/2024     04/12/2024    K 4.6 04/12/2024    GLU 93 04/12/2024    CREATININE 0.71 04/12/2024    EGFRNORACEVR 97 04/12/2024    HGBA1C 6.0 (H) 04/12/2024      Review of patient's allergies indicates:   Allergen Reactions    Adhesive      Sensitive to Steri-strips     Levaquin [levofloxacin] Other (See Comments)     Tendons leg hurt     Guaifenesin Rash     Rash legs       Encounter Diagnosis   Name Primary?    Urinary tract infection without hematuria, site unspecified Yes        No orders of the defined types were placed in this encounter.     Medications Ordered This Encounter   Medications    nitrofurantoin, macrocrystal-monohydrate, (MACROBID) 100 MG capsule     Sig: Take 1 capsule (100 mg total) by mouth 2 (two) times daily. for 5 days     Dispense:  10 capsule     Refill:  0    phenazopyridine (PYRIDIUM) 200 MG tablet     Sig: Take 1 tablet (200 mg total) by mouth 3 (three) times daily as needed for Pain.     Dispense:  9 tablet     Refill:  0        E-Visit Time Tracking:    Day 1 Time (in minutes): 11    Total Time (in minutes): 11      274}

## 2024-05-10 NOTE — TELEPHONE ENCOUNTER
No care due was identified.  [facility] Embedded Care Due Messages. Reference number: [id number].   5/10/2024 11:19:56 AM CDT

## 2024-05-10 NOTE — TELEPHONE ENCOUNTER
Refill Decision Note   Nena Houston  is requesting a refill authorization.  Brief Assessment and Rationale for Refill:  Approve     Medication Therapy Plan:         Comments:     Note composed:2:16 PM 05/10/2024

## 2024-05-20 ENCOUNTER — PATIENT MESSAGE (OUTPATIENT)
Dept: FAMILY MEDICINE | Facility: CLINIC | Age: 59
End: 2024-05-20
Payer: COMMERCIAL

## 2024-05-20 DIAGNOSIS — N93.9 ABNORMAL VAGINAL BLEEDING: Primary | ICD-10-CM

## 2024-05-20 NOTE — TELEPHONE ENCOUNTER
Sent referral to Gyn -- she had full Hyst  Does she thinks its from UTI or kidney stones?    Has she seen her urology MD ?     Make her an appt w NP if able next few days

## 2024-05-22 NOTE — PLAN OF CARE
Lupe Vallecillo is a 72 year old female presenting with   Chief Complaint   Patient presents with    Office Visit    Follow-up     Seronegative rheumatoid arthritis    Other     Taking ibuprofen daily to help with pain .        PAIN: How much pain have you had because of your arthritis/disease in the past week?    NO PAIN [] [] [] [] [] [] [] [] [] [] [] SEVERE PAIN    0 1 2 3 4 5 6 7 8 9 10      DISEASE ACTIVITY:  Considering all the ways your arthritis/disease affects you?  VERY WELL [] [x] [] [] [] [] [] [] [] [] [] VERY POORLY    0 1 2 3 4 5 6 7 8 9 10      FATIGUE:  How much of a problem has unusual fatigue or tiredness been for you in the past week?    NO PROBLEM [] [] [] [] [] [] [x] [x] [] [] [] MAJOR PROBLEM    0 1 2 3 4 5 6 7 8 9 10      Are you currently pregnant?  No  Is there any possibility that you could be pregnant?  No  Are you planning on becoming pregnant within the next year? No     There were no vitals taken for this visit.      Medications have been reviewed and updated    Denies known Latex allergy or symptoms of Latex sensitivity.  Tobacco use verified.    Health Maintenance Due   Topic Date Due    COVID-19 Vaccine (3 - Moderna risk series) 04/20/2021    DTaP/Tdap/Td Vaccine (2 - Td or Tdap) 09/10/2022       Patient would like communication of their results via:   [employer]    Immunization History   Administered Date(s) Administered    COVID Moderna 0.5 mL 12Y+ 02/24/2021, 03/23/2021    Influenza, High Dose quadrivalent, preserve-free 11/28/2020, 10/21/2021, 11/10/2022, 11/17/2023    Influenza, high dose seasonal, preservative-free 12/08/2016, 10/05/2017, 10/05/2017, 10/22/2018    Influenza, quadrivalent, preserve-free 11/05/2019    Influenza, seasonal, injectable, preservative free 11/02/2015    Influenza, seasonal, injectable, trivalent 10/29/2007, 10/16/2008, 10/03/2009, 11/01/2010, 10/17/2011, 10/29/2012, 10/01/2013, 10/17/2014, 12/08/2016    Pneumococcal Polysaccharide PPV23 02/26/2014,  "[facility]   Physical Therapy Initial Evaluation      Date: 5/16/2023   Name: Nena KNOX [facility] Number: [id number]    Therapy Diagnosis:   Encounter Diagnoses   Name Primary?    Chronic left shoulder pain     Decreased ROM of left shoulder     Weakness of left upper extremity      Physician: Patrick Shi*    Physician Orders: PT Eval and Treat  Medical Diagnosis from Referral: Chronic left shoulder pain [M25.512, G89.29]  Evaluation Date: 5/16/2023  Authorization Period Expiration: 5/14/2023  Plan of Care Expiration: 7/11/2023  Progress Note Due: 6/16/2023  Visit # / Visits authorized: 1/ 1   FOTO: 1/ 3     Precautions: Standard, HTN    Time In: 0515 pm  Time Out: 0600 pm  Total Appointment Time (timed & untimed codes): 45 minutes    SUBJECTIVE     Date of onset: "couple months"    History of current condition - Georgina reports: insidious onset of shoulder pain that has progressively worsened with time. She spent 5 days in the hospital in January and thinks it is possible something happened there. No other specific incidents that she can recall. Had a blot clot rupture in left forearm causing some wasting of muscle, unsure if this is related. No previous injuries or surgeries of the affected area. She was a surgical nurse for about 15 years and some techniques were repetitive in nature. She reports Complex hemiplegic migraines diagnosis that she is able to manage well. She works from home for [employer] but doesn't feel like her work duties are affected much.      Falls: none    Imaging, Xray L shoulder  FINDINGS:  The osseous structures appear well mineralized and well aligned.  No obvious fracture or dislocation is noted.  Spurring is noted at the acromioclavicular joint.    Electronically signed by: Alexandre Sotomayor MD  Date:                                            05/15/2023  Time:                                           11:51    Prior Therapy: yes, low back  Social " 11/06/2019    Pneumococcal conjugate PCV 13 12/18/2015    Tdap 09/10/2012    Zostavax (Zoster Shingles) 12/07/2010    Zoster recombinant 11/28/2020, 05/12/2021              History: lives with their family  Occupation: work at home, desk job, nothing interferes  Prior Level of Function: ind  Current Level of Function: ind, pain/difficulty reaching, overhead, lifting anything more than a bottle of water    Pain:  Current 3/10, worst 8/10, best 3/10   Location: Left shoulder  Description: Aching, Dull, Sharp, and Shooting  Aggravating Factors: reaching, lifting, overhead  Easing Factors: tiger balm, moist heat    Patients goals: get to the point where she does not notice it, comparable to right shoulder     Medical History:   Past Medical History:   Diagnosis Date    Adenoma of pituitary 2002    benign, no longer visible on CT scan after treatment s/p bromocriptine (Dr Guevara)    Anxiety     Gastroesophageal reflux disease with esophagitis without hemorrhage     Hiatal hernia     History of gastric ulcer     esophogeal ulcer 2004    History of pyelonephritis     Three day hospital admission    HSV infection     Hypothyroidism     Kidney stones     multi    Migraine, hemiplegic     complex     Osteoporosis 2014    early     Rosacea     Sustained SVT     Tx w Digoxin    Tubular adenoma of colon     Vitamin D deficiency      Surgical History:   Nena KNOX [facility]  has a past surgical history that includes left foot (1982); pr exploratory of abdomen; Appendectomy (1995); Cholecystectomy (1996); Cardiac catheterization; Incision and drainage of wound; Ganglion cyst excision; Esophagogastroduodenoscopy (2014); Colonoscopy w/ biopsies and polypectomy; Hernia repair (10/2016); Inguinal hernia repair (Bilateral, 10/2016); Laparoscopic lysis of adhesions (N/A, 05/09/2020); Breast biopsy (Left, 2015); Small intestine surgery (05/2020); Cosmetic surgery (Tummy tuck); hysterectomy, total, laparoscopic, with salpingo-oophorectomy (2009); Incisional hernia repair (2009); Lithotripsy (2006); Exploratory laparotomy (1990); Breast surgery (2019); and Hysterectomy (2009).    Medications:   Nena has a  current medication list which includes the following prescription(s): cyclobenzaprine, diltiazem, ergocalciferol, fluticasone propionate, ibandronate, ketorolac, levocetirizine, levothyroxine, lorazepam, naproxen, pantoprazole, potassium chloride sa, sucralfate, and valacyclovir.    Allergies:   Review of patient's allergies indicates:   Allergen Reactions    Adhesive      Sensitive to Steri-strips     Levaquin [levofloxacin] Other (See Comments)     Tendons leg hurt     Guaifenesin Rash     Rash legs      OBJECTIVE     Limitation/Restriction for FOTO Shoulder Survey    Therapist reviewed FOTO scores for Nena Franco on 5/16/2023.   FOTO documents entered into [employer] - see Media section.    Limitation Score: 46%       Posture: minimal increase in guarding and forward shoulder    Sensation: Intact    Reflexes: Intact    Active Range of Motion    Elbow WNL, less extension when hand is supinated    Shoulder Left Right   Flexion 150 180   Abduction 90 170   ER at 0 50 55   Func ER T1 T2   Func IR T10 T7     Passive Range of Motion    Shoulder Left   Flexion 155   Abduction 100   ER at 45 50   ER at 90 40   IR at 45 60   IR at 90 50     Upper Extremity Strength    Elbow Left Right   Biceps 4+/5 5/5   Triceps 4+/5 5/5     Shoulder Left Right   Shoulder flexion: 3+/5 5/5   Shoulder Abduction: 2+/5 5/5   Shoulder ER 3+/5 5/5   Shoulder IR 3+/5 5/5     Special Tests    Impingement Left   Neer Positive   Empty Can Test Positive   Escobar Segun Negative   Painful Arc Sign Positive   Painful ER MMT Negative     RTC Pathology Left   Drop Arm Negative   ER Lag Sign Negative   Empty Can Test Negative   Subscapularis Lift Off Negative     Instability/Labrum Left   Anterior Apprehension/Relocation Negative   Fulcrum Test Negative   Biceps Load II Positive   Richards's Test Positive   Sulcus Sign Negative     Joint Mobility: limited in posterior direction    Upper Limb Tension Test: negative     Palpation:  moderate TTP to lateral  shoulder, and posterior cuff region, increased tension in left upper trap compared to right    TREATMENT     Total Treatment time (time-based codes) separate from Evaluation: (10) minutes      Georgina received the treatments listed below:      Georgina received therapeutic exercises to develop strength, endurance, ROM, flexibility, posture, and core stabilization for (10) minutes including:  HEP review and patient education  See patient instructions for attached HEP    PATIENT EDUCATION AND HOME EXERCISES     Education provided:   - HEP provided and reviewed  - Anatomy and Physiology pertaining to current condition  - Possible response to exercise  - Importance of PT compliance    Written Home Exercises Provided: yes. Exercises were reviewed and Georgina was able to demonstrate them prior to the end of the session.  Georgina demonstrated good  understanding of the education provided. See EMR under Patient Instructions for exercises provided during therapy sessions.    ASSESSMENT     Nena is a 58 y.o. female referred to outpatient Physical Therapy with a medical diagnosis of Chronic left shoulder pain [M25.512, G89.29]. Upon assessment, pt demonstrates decreased let shoulder ROM, limitations in posture, weakness in LUE, and decreased functional mobility/activity tolerance. These impairments are limiting patient's ability to complete daily tasks, lift objects with LUE, reaching out or overhead, and complete recreational activities without pain. Based on these symptoms and current limitations, she would benefit from continued skilled PT.      Pt prognosis is Good due to personal factors and co-morbidities listed below.   Pt will benefit from skilled outpatient Physical Therapy to address the deficits stated above and in the chart below, provide pt/family education, and to maximize pt's level of independence.     Plan of care discussed with patient: Yes  Pt's spiritual, cultural and educational needs considered and patient is  agreeable to the plan of care and goals as stated below:     Anticipated Barriers for therapy: work schedule    Medical Necessity is demonstrated by the following  History  Co-morbidities and personal factors that may impact the plan of care [x] LOW: no personal factors / co-morbidities  [] MODERATE: 1-2 personal factors / co-morbidities  [] HIGH: 3+ personal factors / co-morbidities    Moderate / High Support Documentation:      Examination  Body Structures and Functions, activity limitations and participation restrictions that may impact the plan of care [x] LOW: addressing 1-2 elements  [] MODERATE: 3+ elements  [] HIGH: 4+ elements (please support below)    Moderate / High Support Documentation:      Clinical Presentation [x] LOW: stable  [] MODERATE: Evolving  [] HIGH: Unstable     Decision Making/ Complexity Score: low       Goals:  Short Term Goals: In 4 weeks   1.Pt to be educated on HEP.  2.Patient to increase GH ROM from by 5-10 degrees in limited planes to improve functional mobility.  3.Patient to increase strength by 1/2 grade to improve functional tasks.  4.Patient to have pain less than 4/10 at all times to improve quality of movement.   5.Patient to improve score on the FOTO by 5%.  6. Pt to be educated on postural and body mechanics awareness.    Long Term Goals: In 8 weeks  1. Patient to improve score on the FOTO limitation to 34% or less.  2. Patient to demo increase in UE strength to 5/5 to improve functional tasks.  3. Patient to have decreased pain to 2/10 or less with activities to improve quality of movement.   4. Patient to demo increase GH ROM to WNL to improve functional mobility.  5. Patient to perform daily activities including household duties, reaching, lifting, recreational activities without limitation.    PLAN     Plan of care Certification: 5/16/2023 to 7/11/2023.    Outpatient Physical Therapy 2 times weekly for 8 weeks to include the following interventions: Electrical  Stimulation IFC/TENS, Manual Therapy, Moist Heat/ Ice, Neuromuscular Re-ed, Patient Education, Self Care, Therapeutic Activities, Therapeutic Exercise, and Dry Needling.     Sudhir Hernandez, PT DPT      I CERTIFY THE NEED FOR THESE SERVICES FURNISHED UNDER THIS PLAN OF TREATMENT AND WHILE UNDER MY CARE   Physician's comments:     Physician's Signature: ___________________________________________________

## 2024-06-13 NOTE — TELEPHONE ENCOUNTER
Make her VV w me next wk to discuss ER visit and getting new labs     Recommend Dr Mckeon or [facility] in [place].    But we can discuss fully at visit       
Please review and advise.  
2

## 2024-07-25 ENCOUNTER — HOSPITAL ENCOUNTER (OUTPATIENT)
Dept: RADIOLOGY | Facility: HOSPITAL | Age: 59
Discharge: HOME | End: 2024-07-25
Attending: INTERNAL MEDICINE
Payer: COMMERCIAL

## 2024-07-25 DIAGNOSIS — Z78.0 MENOPAUSE: ICD-10-CM

## 2024-07-25 DIAGNOSIS — M81.0 AGE-RELATED OSTEOPOROSIS WITHOUT CURRENT PATHOLOGICAL FRACTURE: ICD-10-CM

## 2024-07-25 PROCEDURE — 77080 DXA BONE DENSITY AXIAL: CPT | Mod: 26,,, | Performed by: RADIOLOGY

## 2024-07-25 PROCEDURE — 77080 DXA BONE DENSITY AXIAL: CPT | Mod: TC,PO

## 2024-08-30 DIAGNOSIS — N63.22 MASS OF UPPER INNER QUADRANT OF LEFT BREAST: Primary | ICD-10-CM

## 2024-09-09 ENCOUNTER — OFFICE VISIT (OUTPATIENT)
Dept: SURGERY | Facility: CLINIC | Age: 59
End: 2024-09-09
Payer: COMMERCIAL

## 2024-09-09 DIAGNOSIS — N63.22 MASS OF UPPER INNER QUADRANT OF LEFT BREAST: Primary | ICD-10-CM

## 2024-09-09 PROCEDURE — 99999 PR PBB SHADOW E&M-EST. PATIENT-LVL III: CPT | Mod: PBBFAC,,, | Performed by: NURSE PRACTITIONER

## 2024-09-09 PROCEDURE — 99213 OFFICE O/P EST LOW 20 MIN: CPT | Mod: S$GLB,,, | Performed by: NURSE PRACTITIONER

## 2024-09-09 NOTE — PROGRESS NOTES
[facility]  Roel Mejias MD, FACS  Irwin Haines, NP-C      Date of Service: 9/9/2024      Chief Complaint:   Nena Franco is a 59 y.o. female presenting today for her annual evaluation.  She is due for a mammogram. She reports no interval changes.     History of Present Illness:   Mrs. Franco first presented on March 23, 2015 due to a complex left breast mass and biopsy showed only benign changes. MD::: Patrick Shi MD.     Past Medical History:   Diagnosis Date    Adenoma of pituitary 2002    benign, no longer visible on CT scan after treatment s/p bromocriptine (Dr Guevara)    Anxiety     Gastroesophageal reflux disease with esophagitis without hemorrhage     Hiatal hernia     History of gastric ulcer     esophogeal ulcer 2004    History of pyelonephritis     Three day hospital admission    HSV infection     Hypothyroidism     Kidney stones     multi    Mass of upper inner quadrant of left breast 8/18/2023    Migraine, hemiplegic     complex     Osteoporosis 2014    early     Rosacea     Sustained SVT     Tx w Digoxin    Tubular adenoma of colon     Vitamin D deficiency       Past Surgical History:   Procedure Laterality Date    APPENDECTOMY  1995    BREAST BIOPSY Left 2015    bengin w marker ([facility])     BREAST SURGERY  2019    Suction bx left breast    CARDIAC CATHETERIZATION      CHOLECYSTECTOMY  1996    lap baldev with scar revision    COLONOSCOPY W/ BIOPSIES AND POLYPECTOMY      Multi; 2014, 2017    COSMETIC SURGERY  Tummy tuck    2016    ESOPHAGOGASTRODUODENOSCOPY  2014    Dr. Duran    EXPLORATORY LAPAROTOMY  1990    Ruptured left ectopic pregnancy    GANGLION CYST EXCISION      right wrist    HERNIA REPAIR  10/2016    x 2  midline    HYSTERECTOMY, TOTAL, LAPAROSCOPIC, WITH SALPINGO-OOPHORECTOMY  2009    INCISION AND DRAINAGE OF WOUND      spider bite to chest wall    INCISIONAL HERNIA REPAIR  2009    umbilical    INGUINAL HERNIA REPAIR Bilateral  10/2016    LAPAROSCOPIC LYSIS OF ADHESIONS N/A 05/09/2020    Procedure: LYSIS, ADHESIONS, LAPAROSCOPIC;  Surgeon: Brett Huber MD;  Location: [facility] OR;  Service: General;  Laterality: N/A;    left foot  1982    orthopedic repair; trauma lacertation     LITHOTRIPSY  2006    right kidney stone     W[place] EXPLORATORY OF ABDOMEN      for ruptured ectopic        Current Outpatient Medications:     albuterol-budesonide (AIRSUPRA) 90-80 mcg/actuation, Inhale 2 puffs into the lungs every 4 to 6 hours as needed (sob)., Disp: 10.7 g, Rfl: 0    atenoloL (TENORMIN) 25 MG tablet, Take 0.5 tablets (12.5 mg total) by mouth every morning., Disp: 90 tablet, Rfl: 3    cyclobenzaprine (FLEXERIL) 10 MG tablet, Take 10 mg by mouth nightly as needed (migraine headache). , Disp: , Rfl:     diltiaZEM (CARDIZEM CD) 240 MG 24 hr capsule, Take 1 capsule (240 mg total) by mouth nightly., Disp: 90 capsule, Rfl: 3    EC-NAPROXEN 500 mg EC tablet, TAKE 1 TABLET (500 MG TOTAL) BY MOUTH DAILY AS NEEDED (MIGRAINE HEADACHES)., Disp: 90 tablet, Rfl: 1    ergocalciferol (ERGOCALCIFEROL) 50,000 unit Cap, Take 1 capsule (50,000 Units total) by mouth every 7 days., Disp: 12 capsule, Rfl: 2    fluticasone propionate (FLONASE) 50 mcg/actuation nasal spray, 1 spray by Each Nostril route daily as needed for Allergies., Disp: , Rfl:     ibandronate (BONIVA) 150 mg tablet, Take 1 tablet (150 mg total) by mouth every 30 days., Disp: 3 tablet, Rfl: 3    levocetirizine (XYZAL) 5 MG tablet, Take 1 tablet (5 mg total) by mouth once daily., Disp: 90 tablet, Rfl: 3    levothyroxine (SYNTHROID) 100 MCG tablet, TAKE 1 TABLET BY MOUTH EVERY MORNING ON AN EMPTY STOMACH, Disp: 90 tablet, Rfl: 3    LORazepam (ATIVAN) 0.5 MG tablet, Take 1 tablet (0.5 mg total) by mouth daily as needed for Anxiety., Disp: 15 tablet, Rfl: 0    meclizine (ANTIVERT) 25 mg tablet, Take 1 tablet (25 mg total) by mouth 3 (three) times daily as needed for Dizziness., Disp: 20 tablet, Rfl: 0     MELATONIN ORAL, Take by mouth., Disp: , Rfl:     ondansetron (ZOFRAN-ODT) 8 MG TbDL, Take 1 tablet (8 mg total) by mouth every 6 (six) hours as needed (nausea)., Disp: 12 tablet, Rfl: 0    pantoprazole (PROTONIX) 40 MG tablet, Take 1 tablet (40 mg total) by mouth every evening., Disp: 90 tablet, Rfl: 0    potassium chloride SA (K-DUR,KLOR-CON M) 10 MEQ tablet, Take 1 tablet (10 mEq total) by mouth once daily., Disp: 90 tablet, Rfl: 2    sucralfate (CARAFATE) 1 gram tablet, TAKE 1 TABLET BY MOUTH 2 TIMES DAILY., Disp: 180 tablet, Rfl: 3    valACYclovir (VALTREX) 1000 MG tablet, Take 2 tablets (2,000 mg total) by mouth every 12 (twelve) hours as needed (for 2 days (repeat as needed for cold sore)., Disp: 30 tablet, Rfl: 11   Review of patient's allergies indicates:   Allergen Reactions    Adhesive      Sensitive to Steri-strips     Levaquin [levofloxacin] Other (See Comments)     Tendons leg hurt     Guaifenesin Rash     Rash legs      Social History     Tobacco Use    Smoking status: Never    Smokeless tobacco: Never   Substance Use Topics    Alcohol use: Yes     Alcohol/week: 1.0 standard drink of alcohol     Types: 1 Glasses of wine per week     Comment: Rarely      Family History   Problem Relation Name Age of Onset    Cancer Mother Sherice 45        Colon    Hypertension Mother Sherice     Miscarriages / Stillbirths Mother Sherice         8    Deep vein thrombosis Mother Sherice     Lupus Mother Sherice     Fibromyalgia Mother Sherice     Cancer Father Macho         Colon    Hypertension Father Macho     Heart attack Father Macho     Abnormal EKG Father Macho     Arthritis Maternal Uncle Buddy     Cancer Maternal Uncle Buddy         Melanoma    Arthritis Maternal Grandfather Macho tinoco     Cancer Maternal Grandfather Macho tinoco         Lung    Heart disease Paternal Grandmother Nena         SVT CHF    Hypertension Paternal Grandmother Nena     Vision loss Maternal Grandmother Bertha         Cataracts         Review of Systems   Integumentary:  Negative for color change, rash, mole/lesion, breast mass, breast discharge and breast tenderness.   Breast: Negative for mass and tenderness       Physical Exam   HENT:   Head: Normocephalic.   Pulmonary/Chest: Right breast exhibits no inverted nipple, no mass, no nipple discharge, no skin change and no tenderness. Left breast exhibits no inverted nipple, no mass, no nipple discharge, no skin change and no tenderness. No breast swelling.   Genitourinary: No breast swelling.   Musculoskeletal: Lymphadenopathy:      Upper Body:      Right upper body: No supraclavicular or axillary adenopathy.      Left upper body: No supraclavicular or axillary adenopathy.     Neurological: She is alert.        MAMMOGRAM REPORT: FINDINGS: The patient has had a left breast core biopsy. The breast tissue is predominantly fatty. There are no suspicious masses or suspicious calcifications seen to suggest malignancy. Scattered, circumscribed, benign-appearing masses are demonstrated. IMPRESSION: No evidence of malignancy. No significant change when compared to previous exam. Follow-up mammography is recommended in one year.     ASSESSMENT and PLAN OF CARE     1. Mass of upper inner quadrant of left breast  Assessment & Plan:  We reviewed our findings today and her questions were answered.  She understands that her imaging and exams have remained stable (and show nothing concerning).  She is comfortable being followed in a conservative fashion.      She understands the importance of monthly self-breast examination and knows to report any and all changes as they occur.        Medical Decision Making: It is my impression that this patient suffers all conditions contained in this medical document.  Each of these conditions did affect our plan of care and my medical decision making today.  It is my opinion that the medical decision making concerning this patient was of minimal  difficulty based on the  aforementioned conditions.  Any further recommendations will be communicated to the patient by me.  I have reviewed and verified her allergies, list of medications, medical and surgical histories, social history, and a pertinent review of symptoms.     Follow up: 1 year and PRN    For: Physical Examination and MGM (D) at

## 2024-09-29 ENCOUNTER — PATIENT MESSAGE (OUTPATIENT)
Dept: FAMILY MEDICINE | Facility: CLINIC | Age: 59
End: 2024-09-29
Payer: COMMERCIAL

## 2024-10-25 ENCOUNTER — OFFICE VISIT (OUTPATIENT)
Dept: FAMILY MEDICINE | Facility: CLINIC | Age: 59
End: 2024-10-25
Payer: COMMERCIAL

## 2024-10-25 VITALS
SYSTOLIC BLOOD PRESSURE: 128 MMHG | DIASTOLIC BLOOD PRESSURE: 80 MMHG | HEART RATE: 75 BPM | WEIGHT: 186.63 LBS | HEIGHT: 60 IN | BODY MASS INDEX: 36.64 KG/M2 | OXYGEN SATURATION: 97 %

## 2024-10-25 DIAGNOSIS — E66.01 CLASS 2 SEVERE OBESITY WITH SERIOUS COMORBIDITY AND BODY MASS INDEX (BMI) OF 36.0 TO 36.9 IN ADULT, UNSPECIFIED OBESITY TYPE: ICD-10-CM

## 2024-10-25 DIAGNOSIS — F41.9 ANXIETY: ICD-10-CM

## 2024-10-25 DIAGNOSIS — E87.6 LOW BLOOD POTASSIUM: ICD-10-CM

## 2024-10-25 DIAGNOSIS — E78.2 MIXED HYPERLIPIDEMIA: ICD-10-CM

## 2024-10-25 DIAGNOSIS — E03.9 HYPOTHYROIDISM, UNSPECIFIED TYPE: ICD-10-CM

## 2024-10-25 DIAGNOSIS — Z00.00 WELLNESS EXAMINATION: Primary | ICD-10-CM

## 2024-10-25 DIAGNOSIS — R73.03 PRE-DIABETES: ICD-10-CM

## 2024-10-25 DIAGNOSIS — E66.812 CLASS 2 SEVERE OBESITY WITH SERIOUS COMORBIDITY AND BODY MASS INDEX (BMI) OF 36.0 TO 36.9 IN ADULT, UNSPECIFIED OBESITY TYPE: ICD-10-CM

## 2024-10-25 DIAGNOSIS — R25.2 LEG CRAMPS: ICD-10-CM

## 2024-10-25 PROCEDURE — 99999 PR PBB SHADOW E&M-EST. PATIENT-LVL IV: CPT | Mod: PBBFAC,,, | Performed by: INTERNAL MEDICINE

## 2024-10-25 RX ORDER — LORAZEPAM 0.5 MG/1
0.5 TABLET ORAL DAILY PRN
Qty: 30 TABLET | Refills: 0 | Status: SHIPPED | OUTPATIENT
Start: 2024-10-25

## 2024-10-25 RX ORDER — ESTRADIOL 10 UG/1
INSERT VAGINAL
COMMUNITY
Start: 2024-06-12

## 2024-10-25 RX ORDER — POTASSIUM CHLORIDE 750 MG/1
10 TABLET, EXTENDED RELEASE ORAL DAILY
Qty: 90 TABLET | Refills: 2 | Status: SHIPPED | OUTPATIENT
Start: 2024-10-25

## 2024-10-25 NOTE — PROGRESS NOTES
"Subjective:       Patient ID: Nena Franco is a 59 y.o. female.    Chief Complaint: Annual Exam   HPI       OSTEOPOROSIS:  She reports non-adherence to prescribed Boniva due to difficulty remembering the monthly dosage, despite setting multiple reminders. Recent DEXA scan shows worsening bone density with lumbar spine T-score decreasing from -3.0 to -3.4 and left hip T-score at -3.0. She expresses willingness to improve medication compliance after being informed of potential need for IV infusions. She denies ever taking Fosamax or Actonel.      CARDIAC ISSUES:  She reports occasional heart fluttering, typically at night when going to sleep or waking up, describing these episodes as rare and not severe. She is currently taking atenolol at half dose, as the full dose with Cardizem previously caused her heart rate to drop to 40-50 bpm. Recent echocardiogram showed normal results with EF of 65% and no significant valve abnormalities. Holter monitor revealed episodes of atrial tachycardia or SVT, with heart rate reaching up to 180 bpm. She acknowledges still experiencing these episodes but reports not getting "stuck" in them as frequently as before.    GASTROINTESTINAL ISSUES:  She reports stable GI issues. Recent GI consultation occurred with colonoscopy scheduled in 1-2 years. She describes variable bowel movements, experiencing fullness before having a movement within a day or so. She denies significant constipation but notes difficulty passing stool due to possible adhesions or restrictions. She follows a low-residue diet, which has been helpful in managing symptoms and preventing hospitalizations. She denies any recent GI-related hospitalizations this year.    GYNECOLOGICAL HEALTH:  She established care with a new OB/GYN and recently had a comprehensive GYN exam, including a Pap smear. She reports UTI-like symptoms, determined not to be actual urinary tract infections. She has been started on vaginal tablets " for vaginal dryness and irritation in the genital area. This is her first GYN issue since undergoing a total hysterectomy in 2009.    MEDICATIONS:  She reports good compliance with vitamin D supplements, evidenced by her vitamin D level of 59. She takes daily potassium, which has been effective in preventing leg cramps. She uses an inhaler (frequency unspecified) and notes needing an Ativan refill soon.          Gyn: [facility]   Dr Lowe -he also writes compounded Ozempic   MMG:  [facility] 8/23  Dexa:  Osteoporosis  2018 L -3.3 H -2.3 //2022 L-3.0 & H -2.8 // Rx Boniva approx 2019 -missed a lot forgets   Colonoscopy:   12/22 + adenoma r/c 5 yr (changed to Dr Mckeon)   Immunizations: Flu: D Tdap: 2022 Shingles: defer  Covid: defers   Smoker:  Never   Eye: [facility]   Derm:   Note N      Wellness           HTN/Heart palpitations/ Hx sustain SVT, coronary spasm: controlled Rx Cardizem 240, Atenolol 25,   Off Hctz 12.5 ,  Losartan                Prev Tx Digoxin  (3-4 months then stopped)   mgmt cardio Dr Isa Holter 2023       Mixed HLD: uncontrolled.  -167, Tg 168 -143.      Hypothyroid; controlled Rx Levo 100  TPO TB antibody negative.     Chronic GERD w erosive esophagitis: controlled  Rx Protonix 40 daily, Carafate 1 daily.  Mgmt Dr Woo  GI              EGD dilated, 2023    Recurrent Bowel Obstructions: cyclic.  improved w low residual diet.      Glucose intolerance:  improved  A1c 5.9, G 90   Prev 6.0 /            Migraines:  Complex hemiplegic.  Stable Improved with CBD oil OTC.   prev Rx Botox Dr. Paniagua.     (SE: Imitrex: worse)      Vitamin-D deficiency: controlled  Supplement O2 vitamin-D     Metabolic syndrome BMI 37 & 189, 36/186, 35/184, 36/186     HSV 1-2 recurrent: controlled  Rx prn + buttock. Valtrex     Allergies/reactive airway disease:  + symptoms early spring/October.  Rx daily H1 nasal spray, p.r.n. Albuterol, Breo   Prev Advair     Lower back pain L>R:  cyclic Rx patch OTC, prn Flexeril.     ____________________________________________________________________________________________________  Assessment & Plan:  1. Wellness examination  - CBC Without Differential; Future  - Comprehensive Metabolic Panel; Future  - Lipid Panel; Future  - Hemoglobin A1C; Future  - TSH; Future  - T4, Free; Future  - CBC Without Differential  - Comprehensive Metabolic Panel  - Lipid Panel  - Hemoglobin A1C  - TSH  - T4, Free    2. Pre-diabetes  - Comprehensive Metabolic Panel; Future  - Hemoglobin A1C; Future  - Comprehensive Metabolic Panel  - Hemoglobin A1C    3. Leg cramps    4. Mixed hyperlipidemia  - Lipid Panel; Future  - Lipid Panel    5. Hypothyroidism, unspecified type  - TSH; Future  - T4, Free; Future  - TSH  - T4, Free    6. Anxiety  - LORazepam (ATIVAN) 0.5 MG tablet; Take 1 tablet (0.5 mg total) by mouth daily as needed for Anxiety.  Dispense: 30 tablet; Refill: 0    7. Low blood potassium  - potassium chloride SA (K-DUR,KLOR-CON M) 10 MEQ tablet; Take 1 tablet (10 mEq total) by mouth once daily.  Dispense: 90 tablet; Refill: 2    8. Class 2 severe obesity with serious comorbidity and body mass index (BMI) of 36.0 to 36.9 in adult, unspecified obesity type     Wellness examination  -     CBC Without Differential; Future; Expected date: 10/25/2024  -     Comprehensive Metabolic Panel; Future; Expected date: 10/25/2024  -     Lipid Panel; Future; Expected date: 10/25/2024  -     Hemoglobin A1C; Future; Expected date: 10/25/2024  -     TSH; Future; Expected date: 10/25/2024  -     T4, Free; Future; Expected date: 10/25/2024    Pre-diabetes  -     Comprehensive Metabolic Panel; Future; Expected date: 10/25/2024  -     Hemoglobin A1C; Future; Expected date: 10/25/2024    Leg cramps    Mixed hyperlipidemia  -     Lipid Panel; Future; Expected date: 10/25/2024    Hypothyroidism, unspecified type  -     TSH; Future; Expected date: 10/25/2024  -     T4, Free; Future; Expected date:  10/25/2024    Anxiety  -     LORazepam (ATIVAN) 0.5 MG tablet; Take 1 tablet (0.5 mg total) by mouth daily as needed for Anxiety.  Dispense: 30 tablet; Refill: 0    Low blood potassium  -     potassium chloride SA (K-DUR,KLOR-CON M) 10 MEQ tablet; Take 1 tablet (10 mEq total) by mouth once daily.  Dispense: 90 tablet; Refill: 2    Class 2 severe obesity with serious comorbidity and body mass index (BMI) of 36.0 to 36.9 in adult, unspecified obesity type        Continue to work on maintain healthy weight, balanced diet. Avoid unhealthy habits: smoking/vaping, excessive alcohol intake.     Recommend diet exercise:  High protein, low fat, low carb diet - calories women under <1200 & men <1800, carbs <100 G, protein 50-60 G daily. Protein supplements to replace one meal.  Keep all beverages < 10 calories per serving. Keep snacks < 100 calories.   Recommend exercise 160 minutes per week, combo of cardio and weight/strength training.   Visit today included increased complexity associated with the care of the episodic problem  addressed and managing the longitudinal care of the patient due to the serious and/or complex managed problem(s) .svt, HTN      Disclaimer: This note was partly generated using dictation software which may occasionally result in transcription errors  ____________________________________________________________________________________________________  Review of Systems:  Review of Systems      Negative     Objective:     Wt Readings from Last 3 Encounters:   10/25/24 84.6 kg (186 lb 9.9 oz)   04/25/24 83.6 kg (184 lb 4.9 oz)   03/11/24 82.1 kg (181 lb 1.6 oz)     BP Readings from Last 3 Encounters:   10/25/24 128/80   04/25/24 134/86   03/11/24 136/88       Lab Results   Component Value Date    WBC 9.2 04/12/2024    HGB 14.0 04/12/2024    HCT 42.6 04/12/2024     04/12/2024     10/18/2024    K 4.7 10/18/2024     10/18/2024    ALT 21 10/18/2024    AST 21 10/18/2024    CO2 25  10/18/2024    CREATININE 0.75 10/18/2024    BUN 16 10/18/2024    GLU 90 10/18/2024      Hemoglobin A1C   Date Value Ref Range Status   05/08/2023 5.4 <5.7 % of total Hgb Final     Comment:     For the purpose of screening for the presence of  diabetes:     <5.7%       Consistent with the absence of diabetes  5.7-6.4%    Consistent with increased risk for diabetes              (prediabetes)  > or =6.5%  Consistent with diabetes     This assay result is consistent with a decreased risk  of diabetes.     Currently, no consensus exists regarding use of  hemoglobin A1c for diagnosis of diabetes in children.     According to American Diabetes Association (ADA)  guidelines, hemoglobin A1c <7.0% represents optimal  control in non-pregnant diabetic patients. Different  metrics may apply to specific patient populations.   Standards of Medical Care in Diabetes(ADA).         03/15/2022 5.6 <5.7 % of total Hgb Final     Comment:     For the purpose of screening for the presence of  diabetes:     <5.7%       Consistent with the absence of diabetes  5.7-6.4%    Consistent with increased risk for diabetes              (prediabetes)  > or =6.5%  Consistent with diabetes     This assay result is consistent with a decreased risk  of diabetes.     Currently, no consensus exists regarding use of  hemoglobin A1c for diagnosis of diabetes in children.     According to American Diabetes Association (ADA)  guidelines, hemoglobin A1c <7.0% represents optimal  control in non-pregnant diabetic patients. Different  metrics may apply to specific patient populations.   Standards of Medical Care in Diabetes(ADA).           Hemoglobin A1c   Date Value Ref Range Status   10/18/2024 5.9 (H) 4.8 - 5.6 % Final     Comment:              Prediabetes: 5.7 - 6.4           Diabetes: >6.4           Glycemic control for adults with diabetes: <7.0     04/12/2024 6.0 (H) 4.8 - 5.6 % Final     Comment:              Prediabetes: 5.7 - 6.4           Diabetes:  >6.4           Glycemic control for adults with diabetes: <7.0     08/28/2023 5.9 (H) 4.8 - 5.6 % Final     Comment:              Prediabetes: 5.7 - 6.4           Diabetes: >6.4           Glycemic control for adults with diabetes: <7.0        Lab Results   Component Value Date    TSH 1.390 10/18/2024    TSH 1.990 04/12/2024    TSH 2.420 08/28/2023     Lab Results   Component Value Date    FREET4 1.25 08/09/2010    FREET4 1.27 12/10/2009    FREET4 1.30 01/30/2009     Lab Results   Component Value Date    LDLCALC 135 (H) 10/18/2024    LDLCALC 167 (H) 04/12/2024    LDLCALC 119 (H) 08/28/2023     Lab Results   Component Value Date    TRIG 133 10/18/2024    TRIG 143 04/12/2024    TRIG 168 (H) 08/28/2023            Physical Exam      Constitutional:       Appearance: Normal appearance.   HENT:      Head: Normocephalic and atraumatic.   Eyes:      Extraocular Movements: Extraocular movements intact.      Pupils: Pupils are equal, round, and reactive to light.   Cardiovascular:      Rate and Rhythm: Normal rate.   Pulmonary:      Effort: Pulmonary effort is normal.   Neurological:      Mental Status: She is alert and oriented to person, place, and time.   Psychiatric:         Mood and Affect: Mood normal.     Medication List with Changes/Refills   Current Medications    ALBUTEROL-BUDESONIDE (AIRSUPRA) 90-80 MCG/ACTUATION    Inhale 2 puffs into the lungs every 4 to 6 hours as needed (sob).    ATENOLOL (TENORMIN) 25 MG TABLET    Take 0.5 tablets (12.5 mg total) by mouth every morning.    CYCLOBENZAPRINE (FLEXERIL) 10 MG TABLET    Take 10 mg by mouth nightly as needed (migraine headache).     DILTIAZEM (CARDIZEM CD) 240 MG 24 HR CAPSULE    Take 1 capsule (240 mg total) by mouth nightly.    EC-NAPROXEN 500 MG EC TABLET    TAKE 1 TABLET (500 MG TOTAL) BY MOUTH DAILY AS NEEDED (MIGRAINE HEADACHES).    ERGOCALCIFEROL (ERGOCALCIFEROL) 50,000 UNIT CAP    Take 1 capsule (50,000 Units total) by mouth every 7 days.    FLUTICASONE  PROPIONATE (FLONASE) 50 MCG/ACTUATION NASAL SPRAY    1 spray by Each Nostril route daily as needed for Allergies.    IBANDRONATE (BONIVA) 150 MG TABLET    Take 1 tablet (150 mg total) by mouth every 30 days.    LEVOCETIRIZINE (XYZAL) 5 MG TABLET    Take 1 tablet (5 mg total) by mouth once daily.    LEVOTHYROXINE (SYNTHROID) 100 MCG TABLET    TAKE 1 TABLET BY MOUTH EVERY MORNING ON AN EMPTY STOMACH    MECLIZINE (ANTIVERT) 25 MG TABLET    Take 1 tablet (25 mg total) by mouth 3 (three) times daily as needed for Dizziness.    MELATONIN ORAL    Take by mouth.    ONDANSETRON (ZOFRAN-ODT) 8 MG TBDL    Take 1 tablet (8 mg total) by mouth every 6 (six) hours as needed (nausea).    PANTOPRAZOLE (PROTONIX) 40 MG TABLET    Take 1 tablet (40 mg total) by mouth every evening.    SUCRALFATE (CARAFATE) 1 GRAM TABLET    TAKE 1 TABLET BY MOUTH 2 TIMES DAILY.    VAGIFEM 10 MCG TAB    INSERT 1 TABLET VAGINALLY AT BEDTIME FOR 2 WEEKS THEN 2-3 TIMES WEEKLY    VALACYCLOVIR (VALTREX) 1000 MG TABLET    Take 2 tablets (2,000 mg total) by mouth every 12 (twelve) hours as needed (for 2 days (repeat as needed for cold sore).   Changed and/or Refilled Medications    Modified Medication Previous Medication    LORAZEPAM (ATIVAN) 0.5 MG TABLET LORazepam (ATIVAN) 0.5 MG tablet       Take 1 tablet (0.5 mg total) by mouth daily as needed for Anxiety.    Take 1 tablet (0.5 mg total) by mouth daily as needed for Anxiety.    POTASSIUM CHLORIDE SA (K-DUR,KLOR-CON M) 10 MEQ TABLET potassium chloride SA (K-DUR,KLOR-CON M) 10 MEQ tablet       Take 1 tablet (10 mEq total) by mouth once daily.    Take 1 tablet (10 mEq total) by mouth once daily.

## 2024-11-03 RX ORDER — LEVOCETIRIZINE DIHYDROCHLORIDE 5 MG/1
5 TABLET, FILM COATED ORAL
Qty: 90 TABLET | Refills: 3 | Status: SHIPPED | OUTPATIENT
Start: 2024-11-03

## 2024-11-03 NOTE — TELEPHONE ENCOUNTER
Refill Decision Note   Nena Joan  is requesting a refill authorization.  Brief Assessment and Rationale for Refill:  Approve     Medication Therapy Plan:        Comments:     Note composed:9:48 AM 11/03/2024

## 2024-11-03 NOTE — TELEPHONE ENCOUNTER
Care Due:                  Date            Visit Type   Department     Provider  --------------------------------------------------------------------------------                                MYCHART                              FOLLOWUP/OF  [facility] FAMILY  Last Visit: 10-      FICE VISIT   MEDICINE       Patrick Shi  Next Visit: None Scheduled  None         None Found                                                            Last  Test          Frequency    Reason                     Performed    Due Date  --------------------------------------------------------------------------------    Mg Level....  12 months..  ibandronate..............  01- 12-    Phosphate...  12 months..  ibandronate..............  Not Found    Overdue    [employer] Embedded Care Due Messages. Reference number: [id number].   11/03/2024 7:09:44 AM CST

## 2025-01-07 ENCOUNTER — PATIENT MESSAGE (OUTPATIENT)
Dept: FAMILY MEDICINE | Facility: CLINIC | Age: 60
End: 2025-01-07
Payer: COMMERCIAL

## 2025-01-07 DIAGNOSIS — N20.0 KIDNEY STONES: ICD-10-CM

## 2025-01-07 DIAGNOSIS — G43.409 HEMIPLEGIC MIGRAINE WITHOUT STATUS MIGRAINOSUS, NOT INTRACTABLE: Primary | ICD-10-CM

## 2025-01-07 DIAGNOSIS — F41.9 ANXIETY: ICD-10-CM

## 2025-01-07 NOTE — TELEPHONE ENCOUNTER
Care Due:                  Date            Visit Type   Department     Provider  --------------------------------------------------------------------------------                                MYCHART                              FOLLOWUP/OF  [facility] FAMILY  Last Visit: 10-      FICE VISIT   MEDICINE       Patrick Shi  Next Visit: None Scheduled  None         None Found                                                            Last  Test          Frequency    Reason                     Performed    Due Date  --------------------------------------------------------------------------------    Mg Level....  12 months..  ibandronate..............  01- 12-    Phosphate...  12 months..  ibandronate..............  Not Found    Overdue    [employer] Embedded Care Due Messages. Reference number: [id number].   1/07/2025 5:30:11 PM CST

## 2025-01-08 RX ORDER — KETOROLAC TROMETHAMINE 10 MG/1
10 TABLET, FILM COATED ORAL EVERY 6 HOURS
Qty: 20 TABLET | Refills: 0 | Status: SHIPPED | OUTPATIENT
Start: 2025-01-08 | End: 2025-01-13

## 2025-01-08 RX ORDER — LORAZEPAM 0.5 MG/1
0.5 TABLET ORAL DAILY PRN
Qty: 30 TABLET | Refills: 0 | Status: SHIPPED | OUTPATIENT
Start: 2025-01-08

## 2025-02-04 DIAGNOSIS — E03.9 HYPOTHYROIDISM, UNSPECIFIED TYPE: ICD-10-CM

## 2025-02-04 DIAGNOSIS — E55.9 VITAMIN D DEFICIENCY: ICD-10-CM

## 2025-02-04 RX ORDER — ERGOCALCIFEROL 1.25 MG/1
50000 CAPSULE ORAL
Qty: 12 CAPSULE | Refills: 2 | Status: SHIPPED | OUTPATIENT
Start: 2025-02-04

## 2025-02-04 RX ORDER — LEVOTHYROXINE SODIUM 100 UG/1
TABLET ORAL
Qty: 90 TABLET | Refills: 2 | Status: SHIPPED | OUTPATIENT
Start: 2025-02-04

## 2025-02-04 NOTE — TELEPHONE ENCOUNTER
Refill Routing Note   Medication(s) are not appropriate for processing by [facility] for the following reason(s):        Outside of protocol    [facility] action(s):  Route  Approve     Requires labs : Yes             Appointments  past 12m or future 3m with PCP    Date Provider   Last Visit   10/25/2024 Patrick Shi MD   Next Visit   Visit date not found Patrick Shi MD   ED visits in past 90 days: 0        Note composed:5:28 AM 02/04/2025

## 2025-02-04 NOTE — TELEPHONE ENCOUNTER
Care Due:                  Date            Visit Type   Department     Provider  --------------------------------------------------------------------------------                                MYCHART                              FOLLOWUP/OF  [facility] FAMILY  Last Visit: 10-      FICE VISIT   MEDICINE       Patrick Shi  Next Visit: None Scheduled  None         None Found                                                            Last  Test          Frequency    Reason                     Performed    Due Date  --------------------------------------------------------------------------------    CBC.........  12 months..  EC-NAPROXEN, valACYclovir  04- 04-    [facility] Embedded Care Due Messages. Reference number: [id number].   2/04/2025 12:08:08 AM CST

## 2025-03-17 ENCOUNTER — PATIENT MESSAGE (OUTPATIENT)
Dept: FAMILY MEDICINE | Facility: CLINIC | Age: 60
End: 2025-03-17
Payer: COMMERCIAL

## 2025-04-29 ENCOUNTER — PATIENT MESSAGE (OUTPATIENT)
Dept: SURGERY | Facility: CLINIC | Age: 60
End: 2025-04-29
Payer: COMMERCIAL

## 2025-05-01 ENCOUNTER — OFFICE VISIT (OUTPATIENT)
Dept: FAMILY MEDICINE | Facility: CLINIC | Age: 60
End: 2025-05-01
Payer: COMMERCIAL

## 2025-05-01 VITALS
HEIGHT: 60 IN | SYSTOLIC BLOOD PRESSURE: 138 MMHG | HEART RATE: 99 BPM | RESPIRATION RATE: 18 BRPM | BODY MASS INDEX: 37.49 KG/M2 | DIASTOLIC BLOOD PRESSURE: 84 MMHG | WEIGHT: 190.94 LBS | OXYGEN SATURATION: 95 %

## 2025-05-01 DIAGNOSIS — J45.20 MILD INTERMITTENT REACTIVE AIRWAY DISEASE WITH WHEEZING WITHOUT COMPLICATION: ICD-10-CM

## 2025-05-01 DIAGNOSIS — E78.1 HYPERTRIGLYCERIDEMIA: ICD-10-CM

## 2025-05-01 DIAGNOSIS — E55.9 VITAMIN D DEFICIENCY: ICD-10-CM

## 2025-05-01 DIAGNOSIS — M81.0 AGE-RELATED OSTEOPOROSIS WITHOUT CURRENT PATHOLOGICAL FRACTURE: ICD-10-CM

## 2025-05-01 DIAGNOSIS — Z00.00 WELLNESS EXAMINATION: ICD-10-CM

## 2025-05-01 DIAGNOSIS — D75.839 THROMBOCYTOSIS: ICD-10-CM

## 2025-05-01 DIAGNOSIS — E03.9 HYPOTHYROIDISM, UNSPECIFIED TYPE: ICD-10-CM

## 2025-05-01 DIAGNOSIS — R79.89 ABNORMAL CBC: ICD-10-CM

## 2025-05-01 DIAGNOSIS — R73.03 PRE-DIABETES: ICD-10-CM

## 2025-05-01 DIAGNOSIS — Z12.31 VISIT FOR SCREENING MAMMOGRAM: Primary | ICD-10-CM

## 2025-05-01 DIAGNOSIS — E66.01 CLASS 2 SEVERE OBESITY WITH SERIOUS COMORBIDITY AND BODY MASS INDEX (BMI) OF 36.0 TO 36.9 IN ADULT, UNSPECIFIED OBESITY TYPE: ICD-10-CM

## 2025-05-01 DIAGNOSIS — M79.10 MYALGIA: ICD-10-CM

## 2025-05-01 DIAGNOSIS — E66.812 CLASS 2 SEVERE OBESITY WITH SERIOUS COMORBIDITY AND BODY MASS INDEX (BMI) OF 36.0 TO 36.9 IN ADULT, UNSPECIFIED OBESITY TYPE: ICD-10-CM

## 2025-05-01 DIAGNOSIS — R06.2 WHEEZING: ICD-10-CM

## 2025-05-01 PROCEDURE — 99999 PR PBB SHADOW E&M-EST. PATIENT-LVL V: CPT | Mod: PBBFAC,,, | Performed by: INTERNAL MEDICINE

## 2025-05-01 RX ORDER — IBANDRONATE SODIUM 150 MG/1
150 TABLET, FILM COATED ORAL
Qty: 3 TABLET | Refills: 3 | Status: SHIPPED | OUTPATIENT
Start: 2025-05-01

## 2025-05-01 NOTE — PROGRESS NOTES
Subjective:       Patient ID: Nena Franco is a 60 y.o. female.    Chief Complaint: Follow-up   HPI     History of Present Illness            Gyn: [facility]   Dr Lowe -he also writes compounded Ozempic   [facility]:  [facility] [place] 9/24   Dexa:  Osteoporosis  2018 L -3.3 H -2.3 //2022 L-3.0 & H -2.8 // Rx Boniva approx 2019 not missing now   Colonoscopy:   12/22 + adenoma r/c 5 yr (changed to Dr Mckeon)   Immunizations: Flu: D Tdap: 2022 Shingles: D  Smoker:  Never   [place] [facility] [place]:   Note N     New Insurance starts in June needs Wellness visit     Practice has closed in [place] -- [facility]    Coughing since Feb 25' house repair floor like refill inhaler    Having to use inhaler more.        160/108 w work stress         HTN/Heart palpitations/ Hx sustain SVT, coronary spasm: Controlled Rx Cardizem 240, not taking can't cut pill to 1/2 size Atenolol 25,   Off Hctz 12.5,  Losartan  ,      Holter 2023                Prev Tx Digoxin  (3-4 months then stopped)   mgmt cardio Dr Weaver               Mixed HLD:  -167, Tg 168 -143.  Improved diet   Myalgias     Hypothyroid; controlled Rx Levo 100  TPO TB antibody negative.     Chronic GERD w erosive esophagitis: controlled  Rx Protonix 40 daily, prn Carafate 1 daily.  Mgmt Dr Woo GI.                EGD dilated, 2023    Recurrent Bowel Obstructions: cyclic.  improved w low residual diet.      Glucose intolerance:  Approved fasting glucose.  A1c 5.9, G 90   Prev 6.0            Migraines:  Complex hemiplegic.  Stable  OTC CBD oil OTC.   prev Rx Botox Dr. Paniagua.     (SE: Imitrex: worse)      Vitamin-D deficiency: controlled  Supplement OTC vitamin-D     HSV 1-2 recurrent: controlled + buttock Rx prn  Valtrex     Allergies/reactive airway disease: cyclic.  + symptoms early spring/October.  Rx daily H1 nasal spray, p.r.n. Albuterol, Breo   Prev Advair     Lower back pain L>R: cyclic Rx patch OTC, prn Flexeril.    Metabolic syndrome BMI 37 & 189, 36/186, 35/184,  36/186, 37/190         ____________________________________________________________________________________________________  Assessment & Plan:  1. Visit for screening mammogram  - Mammo Digital Screening Bilat w/ Glenn (XPD); Future    2. Wheezing    3. Mild intermittent reactive airway disease with wheezing without complication  - albuterol-budesonide (AIRSUPRA) 90-80 mcg/actuation; Inhale 2 puffs into the lungs every 4 to 6 hours as needed (sob).  Dispense: 10.7 g; Refill: 3    4. Vitamin D deficiency    5. Thrombocytosis    6. Hypothyroidism, unspecified type  - T4, Free; Future  - TSH; Future  - T4, Free  - TSH    7. Wellness examination  - CBC Without Differential; Future  - Comprehensive Metabolic Panel; Future  - Hemoglobin A1C; Future  - Iron and TIBC; Future  - Lipid Panel; Future  - T4, Free; Future  - TSH; Future  - Vitamin D; Future  - CBC Without Differential  - Comprehensive Metabolic Panel  - Hemoglobin A1C  - Iron and TIBC  - Lipid Panel  - T4, Free  - TSH  - Vitamin D    8. Pre-diabetes  - Comprehensive Metabolic Panel; Future  - Hemoglobin A1C; Future  - Comprehensive Metabolic Panel  - Hemoglobin A1C    9. Hypertriglyceridemia    10. Age-related osteoporosis without current pathological fracture  - Vitamin D; Future  - Vitamin D    11. Abnormal CBC  - CBC Without Differential; Future  - Iron and TIBC; Future  - CBC Without Differential  - Iron and TIBC    12. Myalgia    13. Class 2 severe obesity with serious comorbidity and body mass index (BMI) of 36.0 to 36.9 in adult, unspecified obesity type     Visit for screening mammogram  -     Mammo Digital Screening Bilat w/ Glenn (XPD); Future; Expected date: 09/10/2025    Wheezing    Mild intermittent reactive airway disease with wheezing without complication  -     albuterol-budesonide (AIRSUPRA) 90-80 mcg/actuation; Inhale 2 puffs into the lungs every 4 to 6 hours as needed (sob).  Dispense: 10.7 g; Refill: 3    Vitamin D  deficiency    Thrombocytosis  Comments:  Improved with hydration    Hypothyroidism, unspecified type  -     Cancel: TSH; Future; Expected date: 05/01/2025  -     Cancel: T4, Free; Future; Expected date: 05/01/2025  -     T4, Free; Future; Expected date: 05/01/2025  -     TSH; Future; Expected date: 05/01/2025    Wellness examination  -     Cancel: Vitamin D; Future; Expected date: 05/01/2025  -     Cancel: CBC Without Differential; Future; Expected date: 05/01/2025  -     Cancel: Comprehensive Metabolic Panel; Future; Expected date: 05/01/2025  -     Cancel: Iron and TIBC; Future; Expected date: 05/01/2025  -     Cancel: Hemoglobin A1C; Future; Expected date: 05/01/2025  -     Cancel: TSH; Future; Expected date: 05/01/2025  -     Cancel: T4, Free; Future; Expected date: 05/01/2025  -     Cancel: Lipid Panel; Future; Expected date: 05/01/2025  -     CBC Without Differential; Future; Expected date: 05/01/2025  -     Comprehensive Metabolic Panel; Future; Expected date: 05/01/2025  -     Hemoglobin A1C; Future; Expected date: 05/01/2025  -     Iron and TIBC; Future; Expected date: 05/01/2025  -     Lipid Panel; Future; Expected date: 05/01/2025  -     T4, Free; Future; Expected date: 05/01/2025  -     TSH; Future; Expected date: 05/01/2025  -     Vitamin D; Future; Expected date: 05/01/2025    Pre-diabetes  -     Cancel: Comprehensive Metabolic Panel; Future; Expected date: 05/01/2025  -     Cancel: Hemoglobin A1C; Future; Expected date: 05/01/2025  -     Comprehensive Metabolic Panel; Future; Expected date: 05/01/2025  -     Hemoglobin A1C; Future; Expected date: 05/01/2025    Hypertriglyceridemia    Age-related osteoporosis without current pathological fracture  -     Cancel: Vitamin D; Future; Expected date: 05/01/2025  -     Vitamin D; Future; Expected date: 05/01/2025    Abnormal CBC  -     Cancel: CBC Without Differential; Future; Expected date: 05/01/2025  -     Cancel: Iron and TIBC; Future; Expected date:  05/01/2025  -     CBC Without Differential; Future; Expected date: 05/01/2025  -     Iron and TIBC; Future; Expected date: 05/01/2025    Myalgia    Class 2 severe obesity with serious comorbidity and body mass index (BMI) of 36.0 to 36.9 in adult, unspecified obesity type        Continue to work on maintain healthy weight, balanced diet. Avoid unhealthy habits: smoking/vaping, excessive alcohol intake.     Recommend diet exercise:  High protein, low fat, low carb diet - calories women under <1200 & men <1800, carbs <100 G, protein 50-60 G daily. Protein supplements to replace one meal.  Keep all beverages < 10 calories per serving. Keep snacks < 100 calories.   Recommend exercise 160 minutes per week, combo of cardio and weight/strength training.     Visit today included increased complexity associated with the care of the episodic problem addressed and managing the longitudinal care of the patient due to the serious and/or complex managed problem(s). HTN      Disclaimer: This note was partly generated using dictation software which may occasionally result in transcription errors  ____________________________________________________________________________________________________  Review of Systems:  Review of Systems      Cough      Objective:     Wt Readings from Last 3 Encounters:   05/01/25 86.6 kg (190 lb 14.7 oz)   10/25/24 84.6 kg (186 lb 9.9 oz)   04/25/24 83.6 kg (184 lb 4.9 oz)     BP Readings from Last 3 Encounters:   05/01/25 138/84   10/25/24 128/80   04/25/24 134/86       Lab Results   Component Value Date    WBC 8.5 04/25/2025    HGB 14.4 04/25/2025    HCT 44.7 04/25/2025     (H) 04/25/2025     04/25/2025    K 4.7 04/25/2025     04/25/2025    ALT 24 04/25/2025    AST 20 04/25/2025    CO2 26 04/25/2025    CREATININE 0.72 04/25/2025    BUN 8 04/25/2025    GLU 93 04/25/2025      Hemoglobin A1C   Date Value Ref Range Status   05/08/2023 5.4 <5.7 % of total Hgb Final     Comment:     For  the purpose of screening for the presence of  diabetes:     <5.7%       Consistent with the absence of diabetes  5.7-6.4%    Consistent with increased risk for diabetes              (prediabetes)  > or =6.5%  Consistent with diabetes     This assay result is consistent with a decreased risk  of diabetes.     Currently, no consensus exists regarding use of  hemoglobin A1c for diagnosis of diabetes in children.     According to American Diabetes Association (ADA)  guidelines, hemoglobin A1c <7.0% represents optimal  control in non-pregnant diabetic patients. Different  metrics may apply to specific patient populations.   Standards of Medical Care in Diabetes(ADA).         03/15/2022 5.6 <5.7 % of total Hgb Final     Comment:     For the purpose of screening for the presence of  diabetes:     <5.7%       Consistent with the absence of diabetes  5.7-6.4%    Consistent with increased risk for diabetes              (prediabetes)  > or =6.5%  Consistent with diabetes     This assay result is consistent with a decreased risk  of diabetes.     Currently, no consensus exists regarding use of  hemoglobin A1c for diagnosis of diabetes in children.     According to American Diabetes Association (ADA)  guidelines, hemoglobin A1c <7.0% represents optimal  control in non-pregnant diabetic patients. Different  metrics may apply to specific patient populations.   Standards of Medical Care in Diabetes(ADA).           Hemoglobin A1c   Date Value Ref Range Status   04/25/2025 5.9 (H) 4.8 - 5.6 % Final     Comment:              Prediabetes: 5.7 - 6.4           Diabetes: >6.4           Glycemic control for adults with diabetes: <7.0     10/18/2024 5.9 (H) 4.8 - 5.6 % Final     Comment:              Prediabetes: 5.7 - 6.4           Diabetes: >6.4           Glycemic control for adults with diabetes: <7.0     04/12/2024 6.0 (H) 4.8 - 5.6 % Final     Comment:              Prediabetes: 5.7 - 6.4           Diabetes: >6.4           Glycemic  control for adults with diabetes: <7.0        Lab Results   Component Value Date    TSH 1.350 04/25/2025    TSH 1.390 10/18/2024    TSH 1.990 04/12/2024     Lab Results   Component Value Date    FREET4 1.25 08/09/2010    FREET4 1.27 12/10/2009    FREET4 1.30 01/30/2009     Lab Results   Component Value Date    LDLCALC 129 (H) 04/25/2025    LDLCALC 135 (H) 10/18/2024    LDLCALC 167 (H) 04/12/2024     Lab Results   Component Value Date    TRIG 189 (H) 04/25/2025    TRIG 133 10/18/2024    TRIG 143 04/12/2024            Physical Exam      Constitutional:       Appearance: Normal appearance.   HENT:      Head: Normocephalic and atraumatic.   Eyes:      Extraocular Movements: Extraocular movements intact.      Pupils: Pupils are equal, round, and reactive to light.   Cardiovascular:      Rate and Rhythm: Normal rate.   Pulmonary:      Effort: Pulmonary effort is normal.   Neurological:      Mental Status: She is alert and oriented to person, place, and time.   Psychiatric:         Mood and Affect: Mood normal.     Medication List with Changes/Refills   Current Medications    CYCLOBENZAPRINE (FLEXERIL) 10 MG TABLET    Take 10 mg by mouth nightly as needed (migraine headache).     DILTIAZEM (CARDIZEM CD) 240 MG 24 HR CAPSULE    Take 1 capsule (240 mg total) by mouth nightly.    EC-NAPROXEN 500 MG EC TABLET    TAKE 1 TABLET (500 MG TOTAL) BY MOUTH DAILY AS NEEDED (MIGRAINE HEADACHES).    ERGOCALCIFEROL (ERGOCALCIFEROL) 50,000 UNIT CAP    TAKE 1 CAPSULE BY MOUTH ONE TIME PER WEEK    FLUTICASONE PROPIONATE (FLONASE) 50 MCG/ACTUATION NASAL SPRAY    1 spray by Each Nostril route daily as needed for Allergies.    IBANDRONATE (BONIVA) 150 MG TABLET    Take 1 tablet (150 mg total) by mouth every 30 days.    LEVOCETIRIZINE (XYZAL) 5 MG TABLET    TAKE 1 TABLET BY MOUTH EVERY DAY    LEVOTHYROXINE (SYNTHROID) 100 MCG TABLET    TAKE 1 TABLET BY MOUTH EVERY DAY IN THE MORNING ON AN EMPTY STOMACH    LORAZEPAM (ATIVAN) 0.5 MG TABLET     Take 1 tablet (0.5 mg total) by mouth daily as needed for Anxiety.    MECLIZINE (ANTIVERT) 25 MG TABLET    Take 1 tablet (25 mg total) by mouth 3 (three) times daily as needed for Dizziness.    MELATONIN ORAL    Take by mouth.    ONDANSETRON (ZOFRAN-ODT) 8 MG TBDL    Take 1 tablet (8 mg total) by mouth every 6 (six) hours as needed (nausea).    PANTOPRAZOLE (PROTONIX) 40 MG TABLET    Take 1 tablet (40 mg total) by mouth every evening.    POTASSIUM CHLORIDE SA (K-DUR,KLOR-CON M) 10 MEQ TABLET    Take 1 tablet (10 mEq total) by mouth once daily.    SUCRALFATE (CARAFATE) 1 GRAM TABLET    TAKE 1 TABLET BY MOUTH 2 TIMES DAILY.    VAGIFEM 10 MCG TAB    INSERT 1 TABLET VAGINALLY AT BEDTIME FOR 2 WEEKS THEN 2-3 TIMES WEEKLY    VALACYCLOVIR (VALTREX) 1000 MG TABLET    Take 2 tablets (2,000 mg total) by mouth every 12 (twelve) hours as needed (for 2 days (repeat as needed for cold sore).   Changed and/or Refilled Medications    Modified Medication Previous Medication    ALBUTEROL-BUDESONIDE (AIRSUPRA) 90-80 MCG/ACTUATION albuterol-budesonide (AIRSUPRA) 90-80 mcg/actuation       Inhale 2 puffs into the lungs every 4 to 6 hours as needed (sob).    Inhale 2 puffs into the lungs every 4 to 6 hours as needed (sob).   Discontinued Medications    ATENOLOL (TENORMIN) 25 MG TABLET    Take 0.5 tablets (12.5 mg total) by mouth every morning.

## 2025-05-19 ENCOUNTER — PATIENT MESSAGE (OUTPATIENT)
Dept: FAMILY MEDICINE | Facility: CLINIC | Age: 60
End: 2025-05-19
Payer: COMMERCIAL

## 2025-05-22 ENCOUNTER — OFFICE VISIT (OUTPATIENT)
Dept: FAMILY MEDICINE | Facility: CLINIC | Age: 60
End: 2025-05-22
Payer: COMMERCIAL

## 2025-05-22 VITALS
SYSTOLIC BLOOD PRESSURE: 122 MMHG | OXYGEN SATURATION: 98 % | WEIGHT: 191.13 LBS | BODY MASS INDEX: 37.53 KG/M2 | HEIGHT: 60 IN | RESPIRATION RATE: 18 BRPM | DIASTOLIC BLOOD PRESSURE: 82 MMHG | HEART RATE: 82 BPM
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
## 2025-05-22 DIAGNOSIS — I10 ESSENTIAL HYPERTENSION: ICD-10-CM

## 2025-05-22 DIAGNOSIS — R07.81 RIB PAIN ON LEFT SIDE: Primary | ICD-10-CM

## 2025-05-22 DIAGNOSIS — S20.212D CONTUSION OF RIB ON LEFT SIDE, SUBSEQUENT ENCOUNTER: ICD-10-CM

## 2025-05-22 PROCEDURE — G2211 COMPLEX E/M VISIT ADD ON: HCPCS | Mod: S$GLB,,, | Performed by: INTERNAL MEDICINE

## 2025-05-22 PROCEDURE — 99214 OFFICE O/P EST MOD 30 MIN: CPT | Mod: S$GLB,,, | Performed by: INTERNAL MEDICINE

## 2025-05-22 PROCEDURE — 99999 PR PBB SHADOW E&M-EST. PATIENT-LVL V: CPT | Mod: PBBFAC,,, | Performed by: INTERNAL MEDICINE

## 2025-05-22 RX ORDER — PREDNISONE 10 MG/1
TABLET ORAL
Qty: 18 TABLET | Refills: 0 | Status: SHIPPED | OUTPATIENT
Start: 2025-05-22

## 2025-05-22 RX ORDER — HYDROCODONE BITARTRATE AND ACETAMINOPHEN 5; 325 MG/1; MG/1
1 TABLET ORAL EVERY 8 HOURS PRN
Qty: 20 TABLET | Refills: 0 | Status: SHIPPED | OUTPATIENT
Start: 2025-05-22 | End: 2025-05-29

## 2025-05-22 RX ORDER — KETOROLAC TROMETHAMINE 10 MG/1
10 TABLET, FILM COATED ORAL EVERY 6 HOURS PRN
COMMUNITY

## 2025-05-22 NOTE — PROGRESS NOTES
Subjective:       Patient ID: Nena Franco is a 60 y.o. female.    Chief Complaint: Fall (Patient had a fall on Sunday and went to urgent care. Patient states she can't get her pain relieved and needed to be seen. )   HPI     History of Present Illness    CHIEF COMPLAINT:  Patient presents with pain and discomfort in the rib area following a fall three days ago.    HPI:  Patient reports falling three days ago around 3 PM at a restaurant when stepping off a concrete ramp. She initially experienced pain rated as 7/10, which worsened to 10/10 after exam at an urgent care facility. The pain is localized to the rib area, specifically from the back to the side, and is described as feeling like a firm line when lying down. She reports constant pain at a level of 6-8/10, even with medication.    She was evaluated at [facility] urgent care immediately after the fall, where an X-ray was performed. The radiology report indicated no obvious fractures. She expresses dissatisfaction with the care received at the urgent care, citing unprofessional conduct and inadequate exam.    She has attempted various treatments including Toradol, naproxen, Flexeril, 5% lidocaine patches, and Lortab, with minimal relief.   Toradol maintains the pain level without worsening it, while Flexeril provided slight relief, reducing the pain to about 6/10 and allowing for approximately 3 hours of sleep. She reports that other treatments have been ineffective.    The pain affects her ability to sleep and work comfortably.    She denies improvement with most medications tried.    IMAGING:  Patient underwent an X-ray three days ago on Sunday. The results showed no obvious fractures, though the specific location of the X-ray was not specified.           5/19 seen at  tripped and fell on concrete yesterday afternoon around 3 PM. She reports she scraped her left knee and her left elbow. Her primary complaint is pain in the left lower rib cage.    Sunday  coming out resturant walking down ramp tipped on lower edge ramp hit rib on edge of ramp.      HTN/Heart palpitations/ Hx sustain SVT, coronary spasm: Controlled Rx Cardizem 240, not taking can't cut pill to 1/2 size Atenolol 25,   Off Hctz 12.5,  Losartan.   Holter 2023                Prev Tx Digoxin  (3-4 months then stopped)   mgmt cardio  Meg               ____________________________________________________________________________________________________  Assessment & Plan:  1. Rib pain on left side  - X-Ray Ribs 2 View Left; Future  - predniSONE (DELTASONE) 10 MG tablet; 3 tab PO QD x 3 days, then 2 tabs po QD x 3 days, then 1 tab po QD x 3 days.  Dispense: 18 tablet; Refill: 0  - HYDROcodone-acetaminophen (NORCO) 5-325 mg per tablet; Take 1 tablet by mouth every 8 (eight) hours as needed for Pain.  Dispense: 20 tablet; Refill: 0    2. Contusion of rib on left side, subsequent encounter  - X-Ray Ribs 2 View Left; Future  - predniSONE (DELTASONE) 10 MG tablet; 3 tab PO QD x 3 days, then 2 tabs po QD x 3 days, then 1 tab po QD x 3 days.  Dispense: 18 tablet; Refill: 0  - HYDROcodone-acetaminophen (NORCO) 5-325 mg per tablet; Take 1 tablet by mouth every 8 (eight) hours as needed for Pain.  Dispense: 20 tablet; Refill: 0    3. Fall, subsequent encounter  - X-Ray Ribs 2 View Left; Future    4. Essential hypertension     Rib pain on left side  -     X-Ray Ribs 2 View Left; Future; Expected date: 05/22/2025  -     predniSONE (DELTASONE) 10 MG tablet; 3 tab PO QD x 3 days, then 2 tabs po QD x 3 days, then 1 tab po QD x 3 days.  Dispense: 18 tablet; Refill: 0  -     HYDROcodone-acetaminophen (NORCO) 5-325 mg per tablet; Take 1 tablet by mouth every 8 (eight) hours as needed for Pain.  Dispense: 20 tablet; Refill: 0    Contusion of rib on left side, subsequent encounter  -     X-Ray Ribs 2 View Left; Future; Expected date: 05/22/2025  -     predniSONE (DELTASONE) 10 MG tablet; 3 tab PO QD x 3 days, then 2 tabs po QD  x 3 days, then 1 tab po QD x 3 days.  Dispense: 18 tablet; Refill: 0  -     HYDROcodone-acetaminophen (NORCO) 5-325 mg per tablet; Take 1 tablet by mouth every 8 (eight) hours as needed for Pain.  Dispense: 20 tablet; Refill: 0    Fall, subsequent encounter  -     X-Ray Ribs 2 View Left; Future; Expected date: 05/22/2025    Essential hypertension        Continue to work on maintain healthy weight, balanced diet. Avoid unhealthy habits: smoking/vaping, excessive alcohol intake.   Visit today included increased complexity associated with the care of the episodic problem addressed and managing the longitudinal care of the patient due to the serious and/or complex managed problem(s). HTN       Disclaimer: This note was partly generated using dictation software which may occasionally result in transcription errors  ____________________________________________________________________________________________________  Review of Systems:  Review of Systems   Constitutional:  Negative for appetite change.   HENT:  Negative for nosebleeds.    Eyes:  Negative for pain.   Respiratory:  Negative for choking.    Cardiovascular:  Negative for chest pain.   Gastrointestinal:  Negative for blood in stool.   Genitourinary:  Negative for hematuria.   Musculoskeletal:  Positive for arthralgias and myalgias. Negative for joint swelling.   Skin:  Negative for pallor.   Neurological:  Negative for facial asymmetry.   Hematological:  Does not bruise/bleed easily.   Psychiatric/Behavioral:  Negative for confusion.            Objective:     Wt Readings from Last 3 Encounters:   05/22/25 86.7 kg (191 lb 2.2 oz)   05/01/25 86.6 kg (190 lb 14.7 oz)   10/25/24 84.6 kg (186 lb 9.9 oz)     BP Readings from Last 3 Encounters:   05/22/25 122/82   05/12/25 128/82   05/01/25 138/84       Lab Results   Component Value Date    WBC 8.5 04/25/2025    HGB 14.4 04/25/2025    HCT 44.7 04/25/2025     (H) 04/25/2025     04/25/2025    K 4.7  04/25/2025     04/25/2025    ALT 24 04/25/2025    AST 20 04/25/2025    CO2 26 04/25/2025    CREATININE 0.72 04/25/2025    BUN 8 04/25/2025    GLU 93 04/25/2025      Hemoglobin A1C   Date Value Ref Range Status   05/08/2023 5.4 <5.7 % of total Hgb Final     Comment:     For the purpose of screening for the presence of  diabetes:     <5.7%       Consistent with the absence of diabetes  5.7-6.4%    Consistent with increased risk for diabetes              (prediabetes)  > or =6.5%  Consistent with diabetes     This assay result is consistent with a decreased risk  of diabetes.     Currently, no consensus exists regarding use of  hemoglobin A1c for diagnosis of diabetes in children.     According to American Diabetes Association (ADA)  guidelines, hemoglobin A1c <7.0% represents optimal  control in non-pregnant diabetic patients. Different  metrics may apply to specific patient populations.   Standards of Medical Care in Diabetes(ADA).         03/15/2022 5.6 <5.7 % of total Hgb Final     Comment:     For the purpose of screening for the presence of  diabetes:     <5.7%       Consistent with the absence of diabetes  5.7-6.4%    Consistent with increased risk for diabetes              (prediabetes)  > or =6.5%  Consistent with diabetes     This assay result is consistent with a decreased risk  of diabetes.     Currently, no consensus exists regarding use of  hemoglobin A1c for diagnosis of diabetes in children.     According to American Diabetes Association (ADA)  guidelines, hemoglobin A1c <7.0% represents optimal  control in non-pregnant diabetic patients. Different  metrics may apply to specific patient populations.   Standards of Medical Care in Diabetes(ADA).           Hemoglobin A1c   Date Value Ref Range Status   04/25/2025 5.9 (H) 4.8 - 5.6 % Final     Comment:              Prediabetes: 5.7 - 6.4           Diabetes: >6.4           Glycemic control for adults with diabetes: <7.0     10/18/2024 5.9 (H) 4.8 -  5.6 % Final     Comment:              Prediabetes: 5.7 - 6.4           Diabetes: >6.4           Glycemic control for adults with diabetes: <7.0     04/12/2024 6.0 (H) 4.8 - 5.6 % Final     Comment:              Prediabetes: 5.7 - 6.4           Diabetes: >6.4           Glycemic control for adults with diabetes: <7.0        Lab Results   Component Value Date    TSH 1.350 04/25/2025    TSH 1.390 10/18/2024    TSH 1.990 04/12/2024     Lab Results   Component Value Date    FREET4 1.25 08/09/2010    FREET4 1.27 12/10/2009    FREET4 1.30 01/30/2009     Lab Results   Component Value Date    LDLCALC 129 (H) 04/25/2025    LDLCALC 135 (H) 10/18/2024    LDLCALC 167 (H) 04/12/2024     Lab Results   Component Value Date    TRIG 189 (H) 04/25/2025    TRIG 133 10/18/2024    TRIG 143 04/12/2024            Physical Exam  Constitutional:       Appearance: Normal appearance.   HENT:      Head: Normocephalic and atraumatic.   Eyes:      Extraocular Movements: Extraocular movements intact.      Pupils: Pupils are equal, round, and reactive to light.   Pulmonary:      Effort: Pulmonary effort is normal.   Abdominal:          Comments: No bruising left lower rib w point tenderness lower rib.   Mild soft tissues swelling left upper abd    Neurological:      Mental Status: She is alert and oriented to person, place, and time.   Psychiatric:         Mood and Affect: Mood normal.           Medication List with Changes/Refills   New Medications    HYDROCODONE-ACETAMINOPHEN (NORCO) 5-325 MG PER TABLET    Take 1 tablet by mouth every 8 (eight) hours as needed for Pain.    PREDNISONE (DELTASONE) 10 MG TABLET    3 tab PO QD x 3 days, then 2 tabs po QD x 3 days, then 1 tab po QD x 3 days.   Current Medications    ALBUTEROL-BUDESONIDE (AIRSUPRA) 90-80 MCG/ACTUATION    Inhale 2 puffs into the lungs every 4 to 6 hours as needed (sob).    CYCLOBENZAPRINE (FLEXERIL) 10 MG TABLET    Take 10 mg by mouth nightly as needed (migraine headache).     DILTIAZEM  (CARDIZEM CD) 240 MG 24 HR CAPSULE    Take 1 capsule (240 mg total) by mouth nightly.    EC-NAPROXEN 500 MG EC TABLET    TAKE 1 TABLET (500 MG TOTAL) BY MOUTH DAILY AS NEEDED (MIGRAINE HEADACHES).    ERGOCALCIFEROL (ERGOCALCIFEROL) 50,000 UNIT CAP    TAKE 1 CAPSULE BY MOUTH ONE TIME PER WEEK    FLUTICASONE PROPIONATE (FLONASE) 50 MCG/ACTUATION NASAL SPRAY    1 spray by Each Nostril route daily as needed for Allergies.    IBANDRONATE (BONIVA) 150 MG TABLET    Take 1 tablet (150 mg total) by mouth every 30 days.    KETOROLAC (TORADOL) 10 MG TABLET    Take 10 mg by mouth every 6 (six) hours as needed for Pain. Rare use    LEVOCETIRIZINE (XYZAL) 5 MG TABLET    TAKE 1 TABLET BY MOUTH EVERY DAY    LEVOTHYROXINE (SYNTHROID) 100 MCG TABLET    TAKE 1 TABLET BY MOUTH EVERY DAY IN THE MORNING ON AN EMPTY STOMACH    LORAZEPAM (ATIVAN) 0.5 MG TABLET    Take 1 tablet (0.5 mg total) by mouth daily as needed for Anxiety.    MECLIZINE (ANTIVERT) 25 MG TABLET    Take 1 tablet (25 mg total) by mouth 3 (three) times daily as needed for Dizziness.    MELATONIN ORAL    Take by mouth.    ONDANSETRON (ZOFRAN-ODT) 8 MG TBDL    Take 1 tablet (8 mg total) by mouth every 6 (six) hours as needed (nausea).    PANTOPRAZOLE (PROTONIX) 40 MG TABLET    Take 1 tablet (40 mg total) by mouth every evening.    POTASSIUM CHLORIDE SA (K-DUR,KLOR-CON M) 10 MEQ TABLET    Take 1 tablet (10 mEq total) by mouth once daily.    SUCRALFATE (CARAFATE) 1 GRAM TABLET    TAKE 1 TABLET BY MOUTH 2 TIMES DAILY.    VAGIFEM 10 MCG TAB    INSERT 1 TABLET VAGINALLY AT BEDTIME FOR 2 WEEKS THEN 2-3 TIMES WEEKLY    VALACYCLOVIR (VALTREX) 1000 MG TABLET    Take 2 tablets (2,000 mg total) by mouth every 12 (twelve) hours as needed (for 2 days (repeat as needed for cold sore).

## 2025-05-26 DIAGNOSIS — E87.6 LOW BLOOD POTASSIUM: ICD-10-CM

## 2025-05-26 RX ORDER — POTASSIUM CHLORIDE 750 MG/1
10 TABLET, EXTENDED RELEASE ORAL
Qty: 90 TABLET | Refills: 3 | Status: SHIPPED | OUTPATIENT
Start: 2025-05-26

## 2025-05-26 NOTE — TELEPHONE ENCOUNTER
Refill Decision Note   Nena Franco  is requesting a refill authorization.  Brief Assessment and Rationale for Refill:  Approve     Medication Therapy Plan:  Drug-Disease: potassium chloride SA and SBO (small bowel obstruction)    Medication Reconciliation Completed: No   Comments:     No Care Gaps recommended.     Note composed:4:46 PM 05/26/2025

## 2025-05-26 NOTE — TELEPHONE ENCOUNTER
Refill Routing Note   Medication(s) are not appropriate for processing by [facility] for the following reason(s):        Drug-disease interaction    [facility] action(s):  Defer      Medication Therapy Plan: Drug-Disease: potassium chloride SA and SBO (small bowel obstruction)    Pharmacist review requested: Yes     Appointments  past 12m or future 3m with PCP    Date Provider   Last Visit   5/22/2025 Patrick Shi MD   Next Visit   11/10/2025 Patrick Shi MD   ED visits in past 90 days: 0        Note composed:3:17 PM 05/26/2025

## 2025-05-26 NOTE — TELEPHONE ENCOUNTER
Care Due:                  Date            Visit Type   Department     Provider  --------------------------------------------------------------------------------                                EP -                              PRIMARY      [facility]  Last Visit: 05-      CARE ([facility])   MEDICINE       Patrick Shi                              EP -                              [facility]  Next Visit: 11-      CARE ([facility])   MEDICINE       Patrick Shi                                                            Last  Test          Frequency    Reason                     Performed    Due Date  --------------------------------------------------------------------------------    Mg Level....  12 months..  ibandronate..............  01- 12-    Phosphate...  12 months..  ibandronate..............  Not Found    Overdue    [employer] Embedded Care Due Messages. Reference number: [id number].   5/26/2025 12:09:33 AM CDT

## 2025-07-03 NOTE — PROGRESS NOTES
[facility]   Physical Therapy Treatment Note      Name: Nena KNOX [place]  [facility] Number: [id number]    Therapy Diagnosis:   Encounter Diagnoses   Name Primary?    Decreased ROM of left shoulder Yes    Weakness of left upper extremity      Physician: Patrick Shi*    Visit Date: 6/5/2023    Physician Orders: PT Eval and Treat  Medical Diagnosis from Referral: Chronic left shoulder pain [M25.512, G89.29]  Evaluation Date: 5/16/2023  Authorization Period Expiration: 5/14/2023  Plan of Care Expiration: 7/11/2023  Progress Note Due: 6/16/2023  Visit # / Visits authorized: 2/20 auth (1/1 auth)   FOTO: 1/ 3      Precautions: Standard, HTN    PTA Visit #: 0/5     Time In: 0130 pm  Time Out: 0216 pm  Total Billable Time: 46 minutes    Subjective     Pt reports: she can tell there have been improvements in the shoulder with PT.    She was compliant with home exercise program.  Response to previous treatment: soreness  Functional change: in progress    Pain: 2/10  Location: left shoulder    Objective      Objective Measures updated at progress report unless specified.     Treatment     Georgina received the treatments listed below:      therapeutic exercises to develop strength, endurance, ROM, flexibility, posture, and core stabilization for (10) minutes including:  UBE forward back 2 min each lvl 3.0  Ball flexion stretch 2 min  Dowel ER supine 2 min    neuromuscular re-education activities to improve: Coordination, Kinesthetic, Sense, Proprioception, and Posture for (23) minutes. The following activities were included:  Resisted rows 3x10 reps BTB  Resisted ER/IR 3x10 reps ea towel roll RTB  Prone row 2x10 reps 5#  Prone extension 2x10 reps 3#  Side lying ER 2x10 reps 3#  Scaption 2x10 reps LUE 2#    therapeutic activities to improve functional performance for (8) minutes, including:  Pulleys flex 2 min  Pulleys abd 2 min  Wall slides with towel B x20 reps, single arm    Georgina received the  following supervised modalities after being cleared for contradictions: IFC Electrical Stimulation:  Nena received IFC Electrical Stimulation for pain control applied to the left shoulder. Pt received stimulation set to pt tolerance for (5) minutes. Nena tolderated treatment well without any adverse effects.      Georgina received hot pack for (5) minutes to left shoulder.    Patient Education and Home Exercises       Education provided:   - HEP provided and reviewed  - Anatomy and Physiology pertaining to current condition  - Possible response to exercise  - Importance of PT compliance    Written Home Exercises Provided: Patient instructed to cont prior HEP. Exercises were reviewed and Georgina was able to demonstrate them prior to the end of the session.  Georgina demonstrated good  understanding of the education provided. See EMR under Patient Instructions for exercises provided during therapy sessions    Assessment     Pt tolerates progression of strengthening exercises today and is able to perform resisted scaption while noting fatigue. ROM activities are pain free. She would benefit from continued PT with progression.    Georgina Is progressing well towards her goals.   Pt prognosis is Good.     Pt will continue to benefit from skilled outpatient physical therapy to address the deficits listed in the problem list box on initial evaluation, provide pt/family education and to maximize pt's level of independence in the home and community environment.     Pt's spiritual, cultural and educational needs considered and pt agreeable to plan of care and goals.     Anticipated barriers to physical therapy: work schedule    Goals:  Short Term Goals: In 4 weeks   1.Pt to be educated on HEP. (met)  2.Patient to increase GH ROM from by 5-10 degrees in limited planes to improve functional mobility. (progressing, not met)  3.Patient to increase strength by 1/2 grade to improve functional tasks. (progressing, not met)  4.Patient  to have pain less than 4/10 at all times to improve quality of movement. (progressing, not met)  5.Patient to improve score on the FOTO by 5%. (progressing, not met)  6. Pt to be educated on postural and body mechanics awareness. (progressing, not met)     Long Term Goals: In 8 weeks  1. Patient to improve score on the FOTO limitation to 34% or less. (progressing, not met)  2. Patient to demo increase in UE strength to 5/5 to improve functional tasks. (progressing, not met)  3. Patient to have decreased pain to 2/10 or less with activities to improve quality of movement. (progressing, not met)  4. Patient to demo increase GH ROM to WNL to improve functional mobility. (progressing, not met)  5. Patient to perform daily activities including household duties, reaching, lifting, recreational activities without limitation. (progressing, not met)    Plan     Continue with PT POC to address functional deficits.     Sudhir Hernandez, PT DPT       6

## 2025-07-28 NOTE — TELEPHONE ENCOUNTER
Care Due:                  Date            Visit Type   Department     Provider  --------------------------------------------------------------------------------                                EP -                              [facility]  Last Visit: 05-      CARE ([facility])   MEDICINE       Patrick Shi                               -                              [facility]  Next Visit: 11-      CARE ([facility])   MEDICINE       Patrick Shi                                                            Last  Test          Frequency    Reason                     Performed    Due Date  --------------------------------------------------------------------------------    Mg Level....  12 months..  ibandronate..............  01- 12-    Phosphate...  12 months..  ibandronate..............  Not Found    Overdue    Vitamin D...  12 months..  ergocalciferol...........  10-   10-    [facility] Embedded Care Due Messages. Reference number: [id number].   7/28/2025 1:22:26 PM CDT

## 2025-07-29 RX ORDER — PANTOPRAZOLE SODIUM 40 MG/1
40 TABLET, DELAYED RELEASE ORAL NIGHTLY
Qty: 90 TABLET | Refills: 3 | Status: SHIPPED | OUTPATIENT
Start: 2025-07-29

## 2025-07-29 NOTE — TELEPHONE ENCOUNTER
Refill Routing Note   Medication(s) are not appropriate for processing by [facility] for the following reason(s):        Due for refill >6 months ago    [facility] action(s):  Defer               Appointments  past 12m or future 3m with PCP    Date Provider   Last Visit   5/22/2025 Patrick Shi MD   Next Visit   11/10/2025 Patrick Shi MD   ED visits in past 90 days: 0        Note composed:12:06 PM 07/29/2025

## 2025-08-06 ENCOUNTER — TELEPHONE (OUTPATIENT)
Dept: FAMILY MEDICINE | Facility: CLINIC | Age: 60
End: 2025-08-06
Payer: COMMERCIAL

## 2025-08-06 ENCOUNTER — OFFICE VISIT (OUTPATIENT)
Dept: FAMILY MEDICINE | Facility: CLINIC | Age: 60
End: 2025-08-06
Payer: COMMERCIAL

## 2025-08-06 VITALS
BODY MASS INDEX: 38.6 KG/M2 | HEIGHT: 60 IN | OXYGEN SATURATION: 97 % | WEIGHT: 196.63 LBS | DIASTOLIC BLOOD PRESSURE: 80 MMHG | HEART RATE: 90 BPM | SYSTOLIC BLOOD PRESSURE: 128 MMHG

## 2025-08-06 DIAGNOSIS — I10 PRIMARY HYPERTENSION: ICD-10-CM

## 2025-08-06 DIAGNOSIS — R00.2 HEART PALPITATIONS: Primary | ICD-10-CM

## 2025-08-06 DIAGNOSIS — I47.10 PAROXYSMAL SVT (SUPRAVENTRICULAR TACHYCARDIA): ICD-10-CM

## 2025-08-06 DIAGNOSIS — I49.1 PAC (PREMATURE ATRIAL CONTRACTION): ICD-10-CM

## 2025-08-06 DIAGNOSIS — I49.3 PVC (PREMATURE VENTRICULAR CONTRACTION): ICD-10-CM

## 2025-08-06 LAB
OHS QRS DURATION: 84 MS
OHS QTC CALCULATION: 445 MS

## 2025-08-06 PROCEDURE — 99215 OFFICE O/P EST HI 40 MIN: CPT | Mod: S$GLB,,, | Performed by: INTERNAL MEDICINE

## 2025-08-06 PROCEDURE — 99999 PR PBB SHADOW E&M-EST. PATIENT-LVL IV: CPT | Mod: PBBFAC,,, | Performed by: INTERNAL MEDICINE

## 2025-08-06 PROCEDURE — G2211 COMPLEX E/M VISIT ADD ON: HCPCS | Mod: S$GLB,,, | Performed by: INTERNAL MEDICINE

## 2025-08-06 PROCEDURE — 93005 ELECTROCARDIOGRAM TRACING: CPT | Mod: S$GLB,,, | Performed by: INTERNAL MEDICINE

## 2025-08-06 PROCEDURE — 93010 ELECTROCARDIOGRAM REPORT: CPT | Mod: S$GLB,,, | Performed by: INTERNAL MEDICINE

## 2025-08-06 RX ORDER — METOPROLOL SUCCINATE 25 MG/1
12.5-25 TABLET, EXTENDED RELEASE ORAL DAILY
Qty: 30 TABLET | Refills: 3 | Status: SHIPPED | OUTPATIENT
Start: 2025-08-06 | End: 2026-08-06

## 2025-08-06 NOTE — TELEPHONE ENCOUNTER
----- Message from Med Assistant [place] sent at 8/6/2025  3:27 PM CDT -----  Regarding: RE Incorrect scheduling  This patient is scheduled as an EKG with a Holter Monitor Order. This must be scheduled as a Holter Monitor or the doctor will not be able to read the study. Please reschedule as a Holter not EKG. There are no openings tomorrow.

## 2025-08-06 NOTE — PROGRESS NOTES
Subjective:       Patient ID: Nena Franco is a 60 y.o. female.    Chief Complaint: Fatigue (Feels like heart is racing. Has not had a normal sinus rhythm according to [facility] [place])   HPI     History of Present Illness              C/o more episodes       For heart palpitations increasing this month.  Has been checking rhythm with cardio mobile.  Multi catching rhythm only thing less 30 seconds.    Appears to be normal sinus rhythm with varied rate occasional wider QRS.    Wasn't able to see Dr Weaver cardio      Going to [facility] Oct - having more episode at night - last < 1 min. Needs to take deep cough.     Noticing tightness at time.  Occ episodes since April.  July worse      More recently last few weeks taking Phenergan at night.  Usually happens around 7-9 pm     No taking any compounded glp or new medications or supplements             HTN/Heart palpitations/paroxysmal SVT, coronary spasm: uncontrolled Rx Cardizem 240, off Atenolol 25 couldn't cut pill in halft    Off Hctz 12.5,  Losartan       Holter 2023  left heart catheterization years ago Dr Weaver               Prev Tx Digoxin  (3-4 months then stopped) took Bystolic 2.5 Rx in the past stopped with hospital admission bowel obstruction  mgmt cardio Dr Weaver . 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       Improved diet              Myalgias     Hypothyroid; controlled Rx Levo 100  TPO TB antibody negative.     Chronic GERD w erosive esophagitis: controlled   Rx Protonix 40 daily, prn Carafate 1 daily.  Mgmt Dr Amna MCCARTHY.                EGD dilated, 2023    Recurrent Bowel Obstructions: cyclic.  improved w low residual diet.      Glucose intolerance:  Approved fasting glucose.  A1c 5.9, G 90   Prev 6.0            Migraines:  Complex hemiplegic.  Stable  OTC CBD oil OTC.   prev Rx Botox Dr. Paniagua.     (SE: Imitrex: worse)     Allergies/reactive airway disease: cyclic.  + symptoms early spring/October.  Rx daily H1 nasal spray, p.r.n. Albuterol, Breo   Prev Advair     Lower back pain L>R: cyclic Rx patch OTC, prn Flexeril.     Metabolic syndrome BMI 38/196   Lowest 184 wt             ____________________________________________________________________________________________________  Assessment & Plan:  1. Heart palpitations  - IN OFFICE EKG 12-LEAD (to Muse)  - Holter monitor - 48 hour; Future  - Basic Metabolic Panel; Future  - TSH; Future  - Basic Metabolic Panel  - TSH  - metoprolol succinate (TOPROL-XL) 25 MG 24 hr tablet; Take 0.5-1 tablets (12.5-25 mg total) by mouth once daily. (Heart palpations)  Dispense: 30 tablet; Refill: 3    2. Paroxysmal SVT (supraventricular tachycardia)  - Holter monitor - 48 hour; Future  - Basic Metabolic Panel; Future  - TSH; Future  - Basic Metabolic Panel  - TSH  - metoprolol succinate (TOPROL-XL) 25 MG 24 hr tablet; Take 0.5-1 tablets (12.5-25 mg total) by mouth once daily. (Heart palpations)  Dispense: 30 tablet; Refill: 3    3. Primary hypertension    4. PAC (premature atrial contraction)    5. PVC (premature ventricular contraction)     Heart palpitations  Comments:  order new Holter monitor  Orders:  -     IN OFFICE EKG 12-LEAD (to Muse)  -     Holter monitor - 48 hour; Future  -     Basic Metabolic Panel; Future; Expected date: 08/06/2025  -     TSH; Future; Expected date: 08/06/2025  -     metoprolol succinate (TOPROL-XL) 25 MG 24 hr tablet; Take 0.5-1 tablets (12.5-25 mg total) by mouth once daily. (Heart palpations)   Dispense: 30 tablet; Refill: 3    Paroxysmal SVT (supraventricular tachycardia)  Comments:  start Metoprolol ER 25 take 1/2 tab daily  Orders:  -     Holter monitor - 48 hour; Future  -     Basic Metabolic Panel; Future; Expected date: 08/06/2025  -     TSH; Future; Expected date: 08/06/2025  -     metoprolol succinate (TOPROL-XL) 25 MG 24 hr tablet; Take 0.5-1 tablets (12.5-25 mg total) by mouth once daily. (Heart palpations)  Dispense: 30 tablet; Refill: 3    Primary hypertension    PAC (premature atrial contraction)    PVC (premature ventricular contraction)        Continue to work on maintain healthy weight, balanced diet. Avoid unhealthy habits: smoking/vaping, excessive alcohol intake.     Recommend diet exercise:  High protein, low fat, low carb diet - calories women under <1200 & men <1800, carbs <100 G, protein 50-60 G daily. Protein supplements to replace one meal.  Keep all beverages < 10 calories per serving. Keep snacks < 100 calories.   Recommend exercise 160 minutes per week, combo of cardio and weight/strength training.     Visit today included increased complexity associated with the care of the episodic problem addressed and managing the longitudinal care of the patient due to the serious and/or complex managed problem(s). HTN      Disclaimer: This note was partly generated using dictation software which may occasionally result in transcription errors  ____________________________________________________________________________________________________  Review of Systems:  Review of Systems   Constitutional:  Negative for appetite change.   HENT:  Negative for nosebleeds.    Eyes:  Negative for pain.   Respiratory:  Positive for chest tightness. Negative for choking and shortness of breath.    Cardiovascular:  Positive for palpitations. Negative for chest pain and leg swelling.   Gastrointestinal:  Negative for blood in stool.   Genitourinary:  Negative for hematuria.   Musculoskeletal:  Negative  for joint swelling.   Skin:  Negative for pallor.   Neurological:  Negative for facial asymmetry.   Hematological:  Does not bruise/bleed easily.   Psychiatric/Behavioral:  Negative for confusion.              Objective:     Wt Readings from Last 3 Encounters:   08/06/25 89.2 kg (196 lb 10.4 oz)   05/22/25 86.7 kg (191 lb 2.2 oz)   05/01/25 86.6 kg (190 lb 14.7 oz)     BP Readings from Last 3 Encounters:   08/06/25 128/80   05/22/25 122/82   05/12/25 128/82       Lab Results   Component Value Date    WBC 8.5 04/25/2025    HGB 14.4 04/25/2025    HCT 44.7 04/25/2025     (H) 04/25/2025     04/25/2025    K 4.7 04/25/2025     04/25/2025    ALT 24 04/25/2025    AST 20 04/25/2025    CO2 26 04/25/2025    CREATININE 0.72 04/25/2025    BUN 8 04/25/2025    GLU 93 04/25/2025      Hemoglobin A1C   Date Value Ref Range Status   05/08/2023 5.4 <5.7 % of total Hgb Final     Comment:     For the purpose of screening for the presence of  diabetes:     <5.7%       Consistent with the absence of diabetes  5.7-6.4%    Consistent with increased risk for diabetes              (prediabetes)  > or =6.5%  Consistent with diabetes     This assay result is consistent with a decreased risk  of diabetes.     Currently, no consensus exists regarding use of  hemoglobin A1c for diagnosis of diabetes in children.     According to American Diabetes Association (ADA)  guidelines, hemoglobin A1c <7.0% represents optimal  control in non-pregnant diabetic patients. Different  metrics may apply to specific patient populations.   Standards of Medical Care in Diabetes(ADA).         03/15/2022 5.6 <5.7 % of total Hgb Final     Comment:     For the purpose of screening for the presence of  diabetes:     <5.7%       Consistent with the absence of diabetes  5.7-6.4%    Consistent with increased risk for diabetes              (prediabetes)  > or =6.5%  Consistent with diabetes     This assay result is consistent with a decreased risk  of  diabetes.     Currently, no consensus exists regarding use of  hemoglobin A1c for diagnosis of diabetes in children.     According to American Diabetes Association (ADA)  guidelines, hemoglobin A1c <7.0% represents optimal  control in non-pregnant diabetic patients. Different  metrics may apply to specific patient populations.   Standards of Medical Care in Diabetes(ADA).           Hemoglobin A1c   Date Value Ref Range Status   04/25/2025 5.9 (H) 4.8 - 5.6 % Final     Comment:              Prediabetes: 5.7 - 6.4           Diabetes: >6.4           Glycemic control for adults with diabetes: <7.0     10/18/2024 5.9 (H) 4.8 - 5.6 % Final     Comment:              Prediabetes: 5.7 - 6.4           Diabetes: >6.4           Glycemic control for adults with diabetes: <7.0     04/12/2024 6.0 (H) 4.8 - 5.6 % Final     Comment:              Prediabetes: 5.7 - 6.4           Diabetes: >6.4           Glycemic control for adults with diabetes: <7.0        Lab Results   Component Value Date    TSH 1.350 04/25/2025    TSH 1.390 10/18/2024    TSH 1.990 04/12/2024     Lab Results   Component Value Date    FREET4 1.25 08/09/2010    FREET4 1.27 12/10/2009    FREET4 1.30 01/30/2009     Lab Results   Component Value Date    LDLCALC 129 (H) 04/25/2025    LDLCALC 135 (H) 10/18/2024    LDLCALC 167 (H) 04/12/2024     Lab Results   Component Value Date    TRIG 189 (H) 04/25/2025    TRIG 133 10/18/2024    TRIG 143 04/12/2024            Physical Exam      Constitutional:       Appearance: Normal appearance.   HENT:      Head: Normocephalic and atraumatic.   Eyes:      Extraocular Movements: Extraocular movements intact.      Pupils: Pupils are equal, round, and reactive to light.   Cardiovascular:      Rate and Rhythm: Normal rate.   Pulmonary:      Effort: Pulmonary effort is normal.   Neurological:      Mental Status: She is alert and oriented to person, place, and time.   Psychiatric:         Mood and Affect: Mood normal.     Medication List  with Changes/Refills   New Medications    METOPROLOL SUCCINATE (TOPROL-XL) 25 MG 24 HR TABLET    Take 0.5-1 tablets (12.5-25 mg total) by mouth once daily. (Heart palpations)   Current Medications    ALBUTEROL-BUDESONIDE (AIRSUPRA) 90-80 MCG/ACTUATION    Inhale 2 puffs into the lungs every 4 to 6 hours as needed (sob).    CYCLOBENZAPRINE (FLEXERIL) 10 MG TABLET    Take 10 mg by mouth nightly as needed (migraine headache).     DILTIAZEM (CARDIZEM CD) 240 MG 24 HR CAPSULE    TAKE 1 CAPSULE BY MOUTH NIGHTLY.    ERGOCALCIFEROL (ERGOCALCIFEROL) 50,000 UNIT CAP    TAKE 1 CAPSULE BY MOUTH ONE TIME PER WEEK    FLUTICASONE PROPIONATE (FLONASE) 50 MCG/ACTUATION NASAL SPRAY    1 spray by Each Nostril route daily as needed for Allergies.    IBANDRONATE (BONIVA) 150 MG TABLET    Take 1 tablet (150 mg total) by mouth every 30 days.    KETOROLAC (TORADOL) 10 MG TABLET    Take 10 mg by mouth every 6 (six) hours as needed for Pain. Rare use    LEVOCETIRIZINE (XYZAL) 5 MG TABLET    TAKE 1 TABLET BY MOUTH EVERY DAY    LEVOTHYROXINE (SYNTHROID) 100 MCG TABLET    TAKE 1 TABLET BY MOUTH EVERY DAY IN THE MORNING ON AN EMPTY STOMACH    LORAZEPAM (ATIVAN) 0.5 MG TABLET    Take 1 tablet (0.5 mg total) by mouth daily as needed for Anxiety.    MECLIZINE (ANTIVERT) 25 MG TABLET    Take 1 tablet (25 mg total) by mouth 3 (three) times daily as needed for Dizziness.    MELATONIN ORAL    Take by mouth.    ONDANSETRON (ZOFRAN-ODT) 8 MG TBDL    Take 1 tablet (8 mg total) by mouth every 6 (six) hours as needed (nausea).    PANTOPRAZOLE (PROTONIX) 40 MG TABLET    Take 1 tablet (40 mg total) by mouth every evening.    POTASSIUM CHLORIDE SA (K-DUR,KLOR-CON M) 10 MEQ TABLET    TAKE 1 TABLET BY MOUTH ONCE DAILY    SUCRALFATE (CARAFATE) 1 GRAM TABLET    TAKE 1 TABLET BY MOUTH 2 TIMES DAILY.    VAGIFEM 10 MCG TAB    INSERT 1 TABLET VAGINALLY AT BEDTIME FOR 2 WEEKS THEN 2-3 TIMES WEEKLY    VALACYCLOVIR (VALTREX) 1000 MG TABLET    Take 2 tablets (2,000 mg  total) by mouth every 12 (twelve) hours as needed (for 2 days (repeat as needed for cold sore).   Discontinued Medications    EC-NAPROXEN 500 MG EC TABLET    TAKE 1 TABLET (500 MG TOTAL) BY MOUTH DAILY AS NEEDED (MIGRAINE HEADACHES).    PREDNISONE (DELTASONE) 10 MG TABLET    3 tab PO QD x 3 days, then 2 tabs po QD x 3 days, then 1 tab po QD x 3 days.

## 2025-08-08 ENCOUNTER — PATIENT MESSAGE (OUTPATIENT)
Dept: FAMILY MEDICINE | Facility: CLINIC | Age: 60
End: 2025-08-08
Payer: COMMERCIAL

## 2025-08-08 ENCOUNTER — HOSPITAL ENCOUNTER (OUTPATIENT)
Dept: CARDIOLOGY | Facility: HOSPITAL | Age: 60
Discharge: HOME | End: 2025-08-08
Attending: INTERNAL MEDICINE
Payer: COMMERCIAL

## 2025-08-08 DIAGNOSIS — I47.10 PAROXYSMAL SVT (SUPRAVENTRICULAR TACHYCARDIA): ICD-10-CM

## 2025-08-08 DIAGNOSIS — R00.2 HEART PALPITATIONS: ICD-10-CM

## 2025-08-08 PROCEDURE — 93225 XTRNL ECG REC<48 HRS REC: CPT | Mod: PO

## 2025-08-08 PROCEDURE — 93227 XTRNL ECG REC<48 HR R&I: CPT | Mod: ,,, | Performed by: INTERNAL MEDICINE

## 2025-08-12 LAB
OHS CV EVENT MONITOR DAY: 2
OHS CV HOLTER LENGTH DECIMAL HOURS: 50.05
OHS CV HOLTER LENGTH HOURS: 2
OHS CV HOLTER LENGTH MINUTES: 3
OHS CV HOLTER SINUS AVERAGE HR: 68
OHS CV HOLTER SINUS MAX HR: 167
OHS CV HOLTER SINUS MIN HR: 51

## 2025-08-13 RX ORDER — LEVOCETIRIZINE DIHYDROCHLORIDE 5 MG/1
5 TABLET, FILM COATED ORAL
Qty: 90 TABLET | Refills: 3 | Status: SHIPPED | OUTPATIENT
Start: 2025-08-13